# Patient Record
Sex: FEMALE | Race: WHITE | NOT HISPANIC OR LATINO | Employment: STUDENT | URBAN - METROPOLITAN AREA
[De-identification: names, ages, dates, MRNs, and addresses within clinical notes are randomized per-mention and may not be internally consistent; named-entity substitution may affect disease eponyms.]

---
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
## 2024-01-05 ENCOUNTER — TELEMEDICINE (OUTPATIENT)
Dept: BEHAVIORAL/MENTAL HEALTH CLINIC | Facility: CLINIC | Age: 19
End: 2024-01-05
Payer: COMMERCIAL

## 2024-01-05 DIAGNOSIS — F33.1 MODERATE EPISODE OF RECURRENT MAJOR DEPRESSIVE DISORDER (HCC): ICD-10-CM

## 2024-01-05 DIAGNOSIS — F41.1 GENERALIZED ANXIETY DISORDER: Primary | ICD-10-CM

## 2024-01-05 PROCEDURE — 90837 PSYTX W PT 60 MINUTES: CPT | Performed by: COUNSELOR

## 2024-01-05 NOTE — BH TREATMENT PLAN
"    Outpatient Behavioral Health Psychotherapy Treatment Plan    Luciana Orozco  2005     Date of Initial Psychotherapy Assessment: 10/23/2019   Date of Current Treatment Plan: 01/05/24  Treatment Plan Target Date: 05/05/24  Treatment Plan Expiration Date: 07/05/24    Diagnosis:   1. Generalized anxiety disorder        2. Moderate episode of recurrent major depressive disorder (HCC)            Area(s) of Need: Anxiety reduction, mood management, and self-harm    Long Term Goal 1 (in the client's own words): I want to continue to resist urges to cut.     Stage of Change: Action    Target Date for completion: TBD     Anticipated therapeutic modalities: Supportive therapy     People identified to complete this goal: client and therapist      Objective 1: (identify the means of measuring success in meeting the objective): Be aware of urges early and use distraction to keep from cutting.      Objective 2: (identify the means of measuring success in meeting the objective): Avoid triggering media       Long Term Goal 2 (in the client's own words): Maintain improved mood (less depression, lower anxiety) currently scaled at 6-7 managed    Stage of Change: Action    Target Date for completion: TBD     Anticipated therapeutic modalities: supportive therapy, med management     People identified to complete this goal: Client, therapist, psychiatrist      Objective 1: (identify the means of measuring success in meeting the objective): Increased time out of the house and in contact with others.      Objective 2: (identify the means of measuring success in meeting the objective): Include and balance \"me time\" that allows time for self-care (doing makeup, coloring, listening to music)     Objective 3: (identify the means of measuring success in meeting the objective): Being able to talk about my anxiety with others is helpful (gets it out of my mind) Releases it.     Objective 4: (identify the means of measuring success in " meeting the objective): See MH supports (psych and therapist) regularly and take meds as prescribed.       I am currently under the care of a [facility] psychiatric provider: yes    My [facility] psychiatric provider is: Dr. Chakraborty    I am currently taking psychiatric medications: Yes, as prescribed    I feel that I will be ready for discharge from mental health care when I reach the following (measurable goal/objective): When I have met the goals identified and maintain stability with reduced MH support.     For children and adults who have a legal guardian:   Has there been any change to custody orders and/or guardianship status? NA. If yes, attach updated documentation.      Behavioral Health Treatment Plan [facility]: Diagnosis and Treatment Plan explained to Luciana Ernesto Luciana Orozco acknowledges an understanding of their diagnosis. Luciana Ernesto agrees to this treatment plan.    I have been offered a copy of this Treatment Plan. Yes  Luciana Orozco, 2005, actively participated in the review and update of this treatment plan during a virtual session, using the [employer] platform.   Luciana Orozco  provided verbal consent on 1/05/24 at 11:40 AM. The treatment plan was transcribed into the Electronic Health Record at a later time.

## 2024-01-05 NOTE — PSYCH
Virtual Regular Visit    Verification of patient location:    Patient is located at Home in the following state in which I hold an active license [place]      Assessment/Plan:    Problem List Items Addressed This Visit        Other    Generalized anxiety disorder - Primary   Other Visit Diagnoses     Moderate episode of recurrent major depressive disorder (HCC)              Goals addressed in session: Goal 1          Reason for visit is   Chief Complaint   Patient presents with   • Virtual Regular Visit          Encounter provider RAMANA Kilgore    Provider located at [facility] TH[facility]APIST [facility]  [facility] THERAPIST MENTAL HEALTH OUTPATIENT  [address] [id number] [phone]      Recent Visits  Date Type Provider Dept   01/05/24 Telemedicine RAMANA Kilgore [facility] Therapist [facility]   Showing recent visits within past 7 days and meeting all other requirements  Future Appointments  No visits were found meeting these conditions.  Showing future appointments within next 150 days and meeting all other requirements       The patient was identified by name and date of birth. Luciana Orozco was informed that this is a telemedicine visit and that the visit is being conducted throughthe [employer] platform. She agrees to proceed..  My office door was closed. No one else was in the room.  She acknowledged consent and understanding of privacy and security of the video platform. The patient has agreed to participate and understands they can discontinue the visit at any time.    Patient is aware this is a billable service.     Subjective  Luciana Orozco is a 18 y.o. adult  .      HPI     Past Medical History:   Diagnosis Date   • Anxiety    • Asthma    • Depression    • Eczema        Past Surgical History:   Procedure Laterality Date   • WISDOM TOOTH EXTRACTION         Current Outpatient Medications   Medication Sig Dispense Refill   • ARIPiprazole (ABILIFY) 5 mg tablet  Aripiprazole 5m 1/2  tablet po  daily 45 tablet 1   • cholecalciferol (VITAMIN D3) 1,000 units tablet Take 1 tablet (1,000 Units total) by mouth daily Do not start before 2023. 30 tablet 0   • ergocalciferol (VITAMIN D2) 50,000 units Take 1 capsule (50,000 Units total) by mouth once a week for 13 doses Do not start before 2023. 13 capsule 0   • FLUoxetine (PROzac) 40 MG capsule Fluoxetine 40m capsules in the morning 60 capsule 1   • hydrocortisone 2.5 % cream Apply 2.5 Applications topically if needed     • levonorgestrel-ethinyl estradiol (Chapis) 90-20 MCG per tablet Take 1 tablet by mouth daily 90 tablet 4   • lidocaine (LIDODERM) 5 % Apply 1 patch topically over 12 hours daily Remove & Discard patch within 12 hours or as directed by MD Do not start before 2023. 30 patch 0   • naltrexone (REVIA) 50 mg tablet Naltrexone 50m tablet po daily 30 tablet 2   • traZODone (DESYREL) 50 mg tablet Take 1 tablet (50 mg total) by mouth daily at bedtime 30 tablet 0     No current facility-administered medications for this visit.        Allergies   Allergen Reactions   • Benadryl [Diphenhydramine] Hyperactivity   • Mangifera Indica Itching     [place]    • Pineapple - Food Allergy Itching       Review of Systems    Video Exam    There were no vitals filed for this visit.    Physical Exam     Behavioral Health Psychotherapy Progress Note    Psychotherapy Provided: Individual Psychotherapy     1. Generalized anxiety disorder        2. Moderate episode of recurrent major depressive disorder (HCC)            Goals addressed in session: Goal 1     DATA:     -CT stated that she is doing well. CT described the holidays that included spending time with BF regularly.   -CT reported that her Catarina is aware that she smokes, no conversation about this but an unspoken acceptance. CT feels more comfortable now that she does not have to sneak.   -CT reported that she is not sure is she passed any of her  "college classes. CT stated that she has decided to only take 1 class at the most in the spring semester and work more hours, still part-time.   -TH invited CT to reflect on her progress with learning to drive. CT stated that she has not been driving because she feels anxious about it. Reviewed anxiety cycle dynamics; what changes a cycle vs what continues the cycle.   -TH invited CT to describe her cannabis use. CT reported daily use with benefits being decreased anxiety and having a more positive attitude/view of herself.   -CT reported that she has not cut in several months. CT noted occasional urges that she deals with by distracting herself. TH celebrated this positive change.  -CT stated that she has decreased her social media use that has happened because she is spending more time out of the house. CT notes a positive benefit of this.  -Reviewed and updated tx plan.   During this session, this clinician used the following therapeutic modalities: Supportive Psychotherapy    Substance Abuse was addressed during this session. If the client is diagnosed with a co-occurring substance use disorder, please indicate any changes in the frequency or amount of use: regular cannabis use-perceived to be fully positive (less anxiety and improved self image) . Stage of change for addressing substance use diagnoses: Pre-contemplation    ASSESSMENT:  Luciana Orozco presents with a Euthymic/ normal mood.     her affect is Normal range and intensity and Flat, which is congruent, with her mood and the content of the session. The client has made progress on their goals.     Luciana Orozco presents with a none risk of suicide, minimal risk of self-harm, and none risk of harm to others.    For any risk assessment that surpasses a \"low\" rating, a safety plan must be developed.    A safety plan was indicated: no  If yes, describe in detail     PLAN: Between sessions, Luciana Orozco will plan for 1-2 classes in spring semester " and discuss with Catarina, continue using distraction to address self-harm urges. . At the next session, the therapist will use Motivational Interviewing and Supportive Psychotherapy to support clietn well being and review pro/cons of regular cannabis use.  .    Behavioral Health Treatment Plan and Discharge Planning: Luciana Orozco is aware of and agrees to continue to work on their treatment plan. They have identified and are working toward their discharge goals. yes    Visit start and stop times:    01/05/24  Start Time: 1103  Stop Time: 1202  Total Visit Time: 59 minutes

## 2024-02-09 DIAGNOSIS — F40.10 SOCIAL ANXIETY DISORDER: ICD-10-CM

## 2024-02-09 RX ORDER — FLUOXETINE HYDROCHLORIDE 40 MG/1
CAPSULE ORAL
Qty: 60 CAPSULE | Refills: 1 | Status: SHIPPED | OUTPATIENT
Start: 2024-02-09

## 2024-02-09 RX ORDER — FLUOXETINE HYDROCHLORIDE 40 MG/1
CAPSULE ORAL
Qty: 60 CAPSULE | Refills: 0 | OUTPATIENT
Start: 2024-02-09

## 2024-02-09 RX ORDER — ARIPIPRAZOLE 5 MG/1
TABLET ORAL
Qty: 45 TABLET | Refills: 0 | OUTPATIENT
Start: 2024-02-09

## 2024-02-09 RX ORDER — ARIPIPRAZOLE 5 MG/1
TABLET ORAL
Qty: 45 TABLET | Refills: 1 | Status: SHIPPED | OUTPATIENT
Start: 2024-02-09

## 2024-02-09 NOTE — TELEPHONE ENCOUNTER
Pt Luciana Orozco, 2005, SLPF was reviewed. Sent Fluoxetine 40mg qty 60  and Aripiprazole 5mg qty 45 was sent to  [facility]    This note was not shared with the patient due to reasonable likelihood of causing patient harm      This note may have been written with the assistance of dictation software. Please excuse any grammatical  errors, misspellings,  and abnormal spacing of letters , sentences or paragraphs . For accurate interpretation should read note horizontally

## 2024-05-20 ENCOUNTER — OFFICE VISIT (OUTPATIENT)
Dept: OBGYN CLINIC | Facility: HOSPITAL | Age: 19
End: 2024-05-20
Payer: COMMERCIAL

## 2024-05-20 ENCOUNTER — HOSPITAL ENCOUNTER (OUTPATIENT)
Dept: RADIOLOGY | Facility: HOSPITAL | Age: 19
Discharge: HOME | End: 2024-05-20
Attending: ORTHOPAEDIC SURGERY
Payer: COMMERCIAL

## 2024-05-20 VITALS — HEART RATE: 84 BPM | OXYGEN SATURATION: 97 %

## 2024-05-20 DIAGNOSIS — M42.06 SCHEUERMANN'S KYPHOSIS OF LUMBAR SPINE: Primary | ICD-10-CM

## 2024-05-20 DIAGNOSIS — M42.06 SCHEUERMANN'S KYPHOSIS OF LUMBAR SPINE: ICD-10-CM

## 2024-05-20 PROCEDURE — 99214 OFFICE O/P EST MOD 30 MIN: CPT | Performed by: ORTHOPAEDIC SURGERY

## 2024-05-20 PROCEDURE — 72082 X-RAY EXAM ENTIRE SPI 2/3 VW: CPT

## 2024-05-20 NOTE — PROGRESS NOTES
18 y.o. adult   Chief complaint:   Chief Complaint   Patient presents with    Spine - Follow-up       HPI:  Here with grandmother for follow up regarding scheuermann kyphosis. Patient has decided against bariatric surgery. She did have a period of time last year where she had SI and checked herself into a mental health facility and has since been doing well. Her back pain has worsened in the interim, which she reports has prevented her from walking. Her immobility secondary to pain has hindered her efforts to lose weight. She denies bowel/bladder issues, night pain, worsening paraesthesias, saddle anesthesia, increasing subjective weakness, clumsiness, gait abnormalities from baseline. She does report bilateral numbness starting in her lateral knee and into the dorsal aspect of the feet bilaterally mostly when she's sitting      Past Medical History:   Diagnosis Date    Anxiety     Asthma     Depression     Eczema      Past Surgical History:   Procedure Laterality Date    WISDOM TOOTH EXTRACTION       Family History   Problem Relation Age of Onset    Liver cancer Maternal Grandfather     Lung cancer Maternal Grandfather      Social History     Socioeconomic History    Marital status: Single     Spouse name: Not on file    Number of children: Not on file    Years of education: Not on file    Highest education level: Not on file   Occupational History    Not on file   Tobacco Use    Smoking status: Never    Smokeless tobacco: Current    Tobacco comments:     Luciana Vape daily    Vaping Use    Vaping status: Some Days    Substances: Nicotine, THC, Flavoring   Substance and Sexual Activity    Alcohol use: Yes     Comment: rarely    Drug use: Yes     Types: Marijuana, Psilocybin     Comment: daily marijuana; mushroom 3-4 months ago    Sexual activity: Yes     Partners: Male, Female     Birth control/protection: Pill, OCP, Condom Male   Other Topics Concern    Not on file   Social History Narrative    Not on file      Social Determinants of Health     Financial Resource Strain: Not on file   Food Insecurity: Not on file   Transportation Needs: Not on file   Physical Activity: Not on file   Stress: Not on file   Social Connections: Not on file   Intimate Partner Violence: Not on file   Housing Stability: Not on file     Current Outpatient Medications   Medication Sig Dispense Refill    ARIPiprazole (ABILIFY) 5 mg tablet Aripiprazole 5m 1/2  tablet po  daily 45 tablet 1    FLUoxetine (PROzac) 40 MG capsule Fluoxetine 40m capsules in the morning 60 capsule 1    cholecalciferol (VITAMIN D3) 1,000 units tablet Take 1 tablet (1,000 Units total) by mouth daily Do not start before 2023. 30 tablet 0    ergocalciferol (VITAMIN D2) 50,000 units Take 1 capsule (50,000 Units total) by mouth once a week for 13 doses Do not start before 2023. 13 capsule 0    hydrocortisone 2.5 % cream Apply 2.5 Applications topically if needed      levonorgestrel-ethinyl estradiol (Chapis) 90-20 MCG per tablet Take 1 tablet by mouth daily 90 tablet 4    lidocaine (LIDODERM) 5 % Apply 1 patch topically over 12 hours daily Remove & Discard patch within 12 hours or as directed by MD Do not start before 2023. 30 patch 0    naltrexone (REVIA) 50 mg tablet Naltrexone 50m tablet po daily 30 tablet 2    traZODone (DESYREL) 50 mg tablet Take 1 tablet (50 mg total) by mouth daily at bedtime 30 tablet 0     No current facility-administered medications for this visit.     Benadryl [diphenhydramine], Mangifera indica, and Pineapple - food allergy  Patient's medications, allergies, past medical, surgical, social and family histories were reviewed and updated as appropriate.     Unless otherwise noted above, past medical history, family history, and social history are noncontributory.    Patient's caretaker was present and provided pertinent history.  I personally reviewed all images and discussed them with the caretaker.  All  plans outlined below were discussed with the patient's caretaker present for this visit.    Review of Systems:  Constitutional: no chills  Respiratory: no chest pain  Cardio: no syncope  GI: no abdominal pain  : no urinary continence  Neuro: no headaches  Psych: no anxiety  Skin: no rash  MS: except as noted in HPI and chief complaint  Allergic/immunology: no contact dermatitis    Physical Exam:  Pulse 84, SpO2 97%, not currently breastfeeding.    Constitutional: Patient is cooperative. Does not have a sickly appearance. Does not appear ill. No distress.   Head: Atraumatic.   Eyes: Conjunctivae are normal.   Cardiovascular: 2+ radial pulses bilaterally with brisk cap refill of all fingers.   Pulmonary/Chest: Effort normal. No stridor.   Abdomen: soft NT/ND  Skin: Skin is warm and dry. No rash noted. No erythema. No skin breakdown.  Psychiatric: mood/affect appropriate, behavior is normal     Spine:  No midline tenderness  Full ROM with pain elicited in mid thoracic region upon deep flexion and limited extension  C5-T1 motor 5/5 and SILT  L2-S1 motor 5/5 and SILT  symmetric normo-reflexic triceps, patella, Achilles, abdominal  no neurocutaneous lesions to suggest spinal dysraphism    Studies reviewed:  XR redemonstrates scheuermann kyphosis  SSV L3 but most lordotic and end kyphotic vertebrae L1      MRI reviewed from last summer with mild L4-L5 central disc herniation without substantial stenosis    Impression:  XR Scheuermann kyphosis with persistent symptoms specifically in the kyphotic region that has been refractory to extended periods of conservative management and discussions    Patient says she's mostly off psych meds and feels this is under control    She has discussed bariatric surgery with a family member and decided this is not a path she currently wishes to pursue    She does meet criteria for PSFI for Scheuermann - if this were pursued we discussed a goal of L2 BHARATI - we moreso discussed the option of  continued observation and no surgery - we discussed appropriateness of expectations and that this procedure could either not change anything, not change enough to be asymptomatic, not fix her lower back pain, and/or make her back pain worse    We also discussed additional risks/benefits associated with PSFI    I recommended avoiding surgery if she considers that a feasible plan but I think at this point she has somewhat optimized all other controllable factors and after multiple extensive outpatient discussions during which she has met standard indications for the procedure is in a position to make a decision. She seemed ready to consent today but I urged her to go home and continue weighing pros and cons    Plan:  Patient's caretaker was present and provided pertinent history.  I personally reviewed all images and discussed them with the caretaker.  All plans outlined below were discussed with the patient's caretaker present for this visit.    Treatment options were discussed in detail. After considering all various options, the plan will include:    Patient to consider nonop vs op options as stated above and call us or reschedule appt for further discussion    If were pursuing surgical intervention:    Vit D was low - recheck and consider endocrine eval/clearance    MRI C/T/L-spine (separate MRIs to assess L-spine in detail)    Other preop labs    PCP clearance    Eventual preop XR (not next visit) supine lateral on bolster (with bolster exactly on apex of kyphosis)      I have spent a total time of >30 minutes on 5/20/2024 in caring for this patient including Diagnostic results, Prognosis, Risks and benefits of tx options, Instructions for management, Patient and family education, Importance of tx compliance, Impressions, Counseling / Coordination of care, Documenting in the medical record, Reviewing / ordering tests, medicine, procedures  , and Obtaining or reviewing history  .

## 2024-06-17 ENCOUNTER — OFFICE VISIT (OUTPATIENT)
Dept: OBGYN CLINIC | Facility: HOSPITAL | Age: 19
End: 2024-06-17
Payer: COMMERCIAL

## 2024-06-17 VITALS — BODY MASS INDEX: 46.12 KG/M2 | WEIGHT: 276.8 LBS | HEART RATE: 91 BPM | HEIGHT: 65 IN | OXYGEN SATURATION: 95 %

## 2024-06-17 DIAGNOSIS — M42.00 SCHEURMANN'S DISEASE: Primary | ICD-10-CM

## 2024-06-17 DIAGNOSIS — E55.9 VITAMIN D INSUFFICIENCY: ICD-10-CM

## 2024-06-17 PROCEDURE — 99214 OFFICE O/P EST MOD 30 MIN: CPT | Performed by: ORTHOPAEDIC SURGERY

## 2024-06-17 RX ORDER — CEFAZOLIN SODIUM 2 G/50ML
2000 SOLUTION INTRAVENOUS ONCE
OUTPATIENT
Start: 2024-06-17 | End: 2024-06-17

## 2024-06-17 NOTE — PROGRESS NOTES
18 y.o. 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 She reports continued upper/mid-back pain > lower back pain. Worse when standing for prolonged periods and going up steps and hills. She reports difficulty standing at work due to symptoms. Does report intermittent numbness in her lateral calf region into dorsal aspect of her feet primarily when sitting for prolonged periods of time. Otherwise denies radicular symptoms. She reports attempted physical therapy in the past without any lasting benefit.      Past Medical History:   Diagnosis Date    Anxiety     Asthma     Depression     Eczema      Past Surgical History:   Procedure Laterality Date    WISDOM TOOTH EXTRACTION       Family History   Problem Relation Age of Onset    Liver cancer Maternal Grandfather     Lung cancer Maternal Grandfather      Social History     Socioeconomic History    Marital status: Single     Spouse name: Not on file    Number of children: Not on file    Years of education: Not on file    Highest education level: Not on file   Occupational History    Not on file   Tobacco Use    Smoking status: Never    Smokeless tobacco: Current    Tobacco comments:     Luciana Vape daily    Vaping Use    Vaping status: Some Days    Substances: Nicotine, THC, Flavoring   Substance and Sexual Activity    Alcohol use: Yes     Comment: rarely    Drug use: Yes     Types: Marijuana, Psilocybin     Comment: daily marijuana; mushroom 3-4 months ago    Sexual activity: Yes     Partners: Male, Female     Birth control/protection: Pill, OCP, Condom Male   Other Topics Concern    Not on file   Social History Narrative    Not on file     Social Determinants of Health     Financial Resource Strain: Not on file   Food Insecurity: Not on file   Transportation Needs: Not on file   Physical Activity: Not on file   Stress: Not on  file   Social Connections: Not on file   Intimate Partner Violence: Not on file   Housing Stability: Not on file     Current Outpatient Medications   Medication Sig Dispense Refill    ARIPiprazole (ABILIFY) 5 mg tablet Aripiprazole 5m 1/2  tablet po  daily (Patient not taking: Reported on 2024) 45 tablet 1    FLUoxetine (PROzac) 40 MG capsule Fluoxetine 40m capsules in the morning (Patient not taking: Reported on 2024) 60 capsule 1    cholecalciferol (VITAMIN D3) 1,000 units tablet Take 1 tablet (1,000 Units total) by mouth daily Do not start before 2023. (Patient not taking: Reported on 2024) 30 tablet 0    ergocalciferol (VITAMIN D2) 50,000 units Take 1 capsule (50,000 Units total) by mouth once a week for 13 doses Do not start before 2023. 13 capsule 0    hydrocortisone 2.5 % cream Apply 2.5 Applications topically if needed (Patient not taking: Reported on 2024)      levonorgestrel-ethinyl estradiol (Chapis) 90-20 MCG per tablet Take 1 tablet by mouth daily (Patient not taking: Reported on 2024) 90 tablet 4    lidocaine (LIDODERM) 5 % Apply 1 patch topically over 12 hours daily Remove & Discard patch within 12 hours or as directed by MD Do not start before 2023. (Patient not taking: Reported on 2024) 30 patch 0    naltrexone (REVIA) 50 mg tablet Naltrexone 50m tablet po daily (Patient not taking: Reported on 2024) 30 tablet 2    traZODone (DESYREL) 50 mg tablet Take 1 tablet (50 mg total) by mouth daily at bedtime 30 tablet 0     No current facility-administered medications for this visit.     Benadryl [diphenhydramine], Mangifera indica, and Pineapple - food allergy  Patient's medications, allergies, past medical, surgical, social and family histories were reviewed and updated as appropriate.     Unless otherwise noted above, past medical history, family history, and social history are noncontributory.    Patient's caretaker was present  "and provided pertinent history.  I personally reviewed all images and discussed them with the caretaker.  All plans outlined below were discussed with the patient's caretaker present for this visit.    Review of Systems:  Constitutional: no chills  Respiratory: no chest pain  Cardio: no syncope  GI: no abdominal pain  : no urinary continence  Neuro: no headaches  Psych: no anxiety  Skin: no rash  MS: except as noted in HPI and chief complaint  Allergic/immunology: no contact dermatitis    Physical Exam:  Pulse 91, height 5' 4.5\" (1.638 m), weight 126 kg (276 lb 12.8 oz), SpO2 95%, not currently breastfeeding.    Constitutional: Patient is cooperative. Does not have a sickly appearance. Does not appear ill. No distress.   Head: Atraumatic.   Eyes: Conjunctivae are normal.   Cardiovascular: 2+ radial pulses bilaterally with brisk cap refill of all fingers.   Pulmonary/Chest: Effort normal. No stridor.   Abdomen: soft NT/ND  Skin: Skin is warm and dry. No rash noted. No erythema. No skin breakdown.  Psychiatric: mood/affect appropriate, behavior is normal     Spine:  No midline tenderness, mild TTP paraspinal musculature  Full ROM with pain elicited in mid thoracic region upon deep flexion, also increased pain with extension  C5-T1 motor 5/5 and SILT  L2-S1 motor 5/5 and SILT  Symmetric normo-reflexic triceps, patella, Achilles, abdominal    Studies reviewed:  MRI L-spine with preserved disc hydration throughout  T-spine with 5 adjacent wedged vertebrae and above normal thoracic kyphosis    Impression:  Scheuermann kyphosis     Patient has had persistent symptoms related to known scheuermann kyphosis that has been refractory to multiple, extended periods of conservative treatment including physical therapy    Patient says she has been off psych meds and feels this is under control, noting she doesn't feel the need to take them currently     Discussed additional risks/benefits associated with PSFI     Plan:  Patient's " caretaker was present and provided pertinent history.  I personally reviewed all images and discussed them with the caretaker.  All plans outlined below were discussed with the patient's caretaker present for this visit.    Treatment options were discussed in detail. After considering all various options, the plan will include:    Again discussed operative vs non operative treatment options, including risks and benefits associated with PSFI. We have now had multiple outpatient visits and a lengthy follow-up period during which we delayed the offer of operative intervention but symptoms persist and she is currently seeking jobs that can be performed sitting due to mid-thoracic back pain at the apex of her kyphosis. Patient wishes to pursue surgical intervention at this time. She has endured an extended period of nonoperative management with a persistent display throughout multiple clinic visits of appropriate insight regarding pros/cons of surgical intervention. We again discussed today risks/benefits of PSFI for spinal deformity and also PJK vs PJF, psuedoarthrosis, possibility of persistent pain, further surgery, decreased motion, etc and she acknowledged these issues.    Will plan to tentatively schedule a surgical date (~6 months from now) and have patient follow up 1 month prior to scheduled surgery date      To complete prior to surgical intervention:    Most recent Vit D was low - placed referral to endocrine for eval/clearance, recheck vitamin D level - goal is to have a plan to maintain optimal vit D in periop timeframe    Will need to obtain PCP clearance    At our preop visit:  -preop XR scoli 3 view (not benders for this patient) --> standing scoli PA + standing scoli lateral + supine lateral on bolster (with bolster just below apex of kyphosis)    -order CT scan for Mazor protocol    -order preop labs - CBC, PT/PTT, BMP, 25-OH-D (if not obtained recently), T&S    -order PFTs    This document was created  using speech voice recognition software.   Grammatical errors, random word insertions, pronoun errors, and incomplete sentences are an occasional consequence of this system due to software limitations, ambient noise, and hardware issues.   Any formal questions or concerns about content, text, or information contained within the body of this dictation should be directly addressed to the provider for clarification.

## 2024-07-05 ENCOUNTER — DOCUMENTATION (OUTPATIENT)
Dept: BEHAVIORAL/MENTAL HEALTH CLINIC | Facility: CLINIC | Age: 19
End: 2024-07-05

## 2024-07-05 DIAGNOSIS — F41.1 GENERALIZED ANXIETY DISORDER: Primary | ICD-10-CM

## 2024-07-05 NOTE — PROGRESS NOTES
Psychotherapy Discharge Summary    Preferred Name: Luciana Orozco  YOB: 2005    Admission date to psychotherapy: 10/23/19    Referred by: unknown    Presenting Problem: Anxiety, family relationship issues    Course of treatment included : medication management, individual case management, psychiatric evaluation, and individual therapy     Progress/Outcome of Treatment Goals (brief summary of course of treatment) Client was able to enroll in college but had difficulties completing class work-withdrew from several classes, maintain part-time employment, and increased her understanding of her anxiety patterns and use new tools to address.     Treatment Complications (if any): Unresolved family relationship challenges in the home caused a great deal of stress. Client stopped engaging in therapy and did not respond to outreach.    Treatment Progress: hugo Chakraborty    Discharge Medications include: see chart records for current meds    Discharge Date: 07/05/24    Discharge Diagnosis:   1. Generalized anxiety disorder            Criteria for Discharge: two or more unexcused absences for services    Aftercare recommendations include (include specific referral names and phone numbers, if appropriate): Client would likely benefit from consistent psychotherapy    Prognosis: fair

## 2024-08-06 NOTE — PROGRESS NOTES
Psychiatric Discharge Summary- Administrative Discharge     Admit Date: See H&P for admit date  Discharge Date: 24     Discharge Diagnosis:   Patient Active Problem List   Diagnosis    Generalized anxiety disorder    Attention deficit disorder predominant inattentive type    Thoughts of self harm    Obesity, Class III, BMI 40-49.9 (morbid obesity) ([facility])    Binge eating disorder    Vitamin D insufficiency    Hypercholesterolemia    Bipolar affective disorder, current episode mixed ([facility])        Treating Physician: Dr. Leilani Chakraborty,       Presenting Problems/Pertinent Findings: See Initial H&P     Course of Treatment:See Most Recent Med Management Progress Note from 11/10/2023.    The patient's primary treating provider is no longer with practice.  Patient has not responded to outreach; letter asking for response regarding her intention to stay in treatment sent 3/22/2024 with no response. The chart is being administratively closed at this time.  For treatment course, please request past records when patient was in treatment with primary provider..      Discharge Medications:   Current Outpatient Medications:     ARIPiprazole (ABILIFY) 5 mg tablet, Aripiprazole 5m 1/2  tablet po  daily (Patient not taking: Reported on 2024), Disp: 45 tablet, Rfl: 1    cholecalciferol (VITAMIN D3) 1,000 units tablet, Take 1 tablet (1,000 Units total) by mouth daily Do not start before 2023. (Patient not taking: Reported on 2024), Disp: 30 tablet, Rfl: 0    ergocalciferol (VITAMIN D2) 50,000 units, Take 1 capsule (50,000 Units total) by mouth once a week for 13 doses Do not start before 2023., Disp: 13 capsule, Rfl: 0    FLUoxetine (PROzac) 40 MG capsule, Fluoxetine 40m capsules in the morning (Patient not taking: Reported on 2024), Disp: 60 capsule, Rfl: 1    hydrocortisone 2.5 % cream, Apply 2.5 Applications topically if needed (Patient not taking: Reported on 2024), Disp: , Rfl:      levonorgestrel-ethinyl estradiol (Chapis) 90-20 MCG per tablet, Take 1 tablet by mouth daily (Patient not taking: Reported on 2024), Disp: 90 tablet, Rfl: 4    lidocaine (LIDODERM) 5 %, Apply 1 patch topically over 12 hours daily Remove & Discard patch within 12 hours or as directed by MD Do not start before 2023. (Patient not taking: Reported on 2024), Disp: 30 patch, Rfl: 0    naltrexone (REVIA) 50 mg tablet, Naltrexone 50m tablet po daily (Patient not taking: Reported on 2024), Disp: 30 tablet, Rfl: 2    traZODone (DESYREL) 50 mg tablet, Take 1 tablet (50 mg total) by mouth daily at bedtime, Disp: 30 tablet, Rfl: 0

## 2024-09-17 ENCOUNTER — CONSULT (OUTPATIENT)
Dept: ENDOCRINOLOGY | Facility: HOSPITAL | Age: 19
End: 2024-09-17
Payer: COMMERCIAL

## 2024-09-17 VITALS
HEIGHT: 65 IN | DIASTOLIC BLOOD PRESSURE: 80 MMHG | SYSTOLIC BLOOD PRESSURE: 122 MMHG | WEIGHT: 271.8 LBS | BODY MASS INDEX: 45.29 KG/M2 | HEART RATE: 70 BPM

## 2024-09-17 DIAGNOSIS — M42.00 SCHEURMANN'S DISEASE: ICD-10-CM

## 2024-09-17 DIAGNOSIS — E55.9 VITAMIN D INSUFFICIENCY: ICD-10-CM

## 2024-09-17 PROCEDURE — 99243 OFF/OP CNSLTJ NEW/EST LOW 30: CPT | Performed by: STUDENT IN AN ORGANIZED HEALTH CARE EDUCATION/TRAINING PROGRAM

## 2024-09-17 NOTE — PROGRESS NOTES
9/17/2024    Assessment & Plan        Problem List Items Addressed This Visit          Other    Vitamin D insufficiency    Relevant Orders    Vitamin D 25 hydroxy    Comprehensive metabolic panel     Other Visit Diagnoses       Scheurmann's disease        Relevant Orders    Vitamin D 25 hydroxy    Comprehensive metabolic panel            Assessment/Plan:  Patient is a 19yF with schaumann kyphosis here for vitD def    1) VitD def:- we do not have repeat labs. Obtain labs now and as long as >30 ok to proceed with orthopedic surgery. If <30, will give high dose replacement and ok to proceed with surgery in 6 weeks after. Repeat vitD level in 3 months     Discussed since only has vitD def, can follow up with PCP as well for management of this.     RTC PRN      CC: vit D def    History of Present Illness     HPI: Luciana Orozco is a 19 y.o. year old adult with history of Schaumann kyphosis follows orthopedics who was referred to us for vitD def. Other Pmhx of anxiety, depression, asthma. Bipolar dx     Patients last VitD level on file 04/23 at 22. No repeat on file. Patient currently not taking any vitD supplement. Did take OTC vitD 1000iu but stopped 1 month ago    Social/Family hx- see below     Review of Systems   Constitutional:  Negative for fatigue and unexpected weight change.   HENT:  Negative for trouble swallowing and voice change.    Eyes:  Negative for photophobia and visual disturbance.   Respiratory:  Negative for choking and shortness of breath.    Gastrointestinal:  Negative for constipation and diarrhea.   Endocrine: Negative for cold intolerance and heat intolerance.   Musculoskeletal:  Negative for arthralgias and myalgias.   Skin:  Negative for rash.       Historical Information   Past Medical History:   Diagnosis Date    Anxiety     Asthma     Depression     Eczema      Past Surgical History:   Procedure Laterality Date    WISDOM TOOTH EXTRACTION       Social History   Social History     Substance  and Sexual Activity   Alcohol Use Yes    Comment: rarely     Social History     Substance and Sexual Activity   Drug Use Yes    Types: Marijuana, Psilocybin    Comment: daily marijuana; mushroom 3-4 months ago     Social History     Tobacco Use   Smoking Status Never   Smokeless Tobacco Current   Tobacco Comments    Luciana Vape daily      Family History:   Family History   Problem Relation Age of Onset    No Known Problems Father     Hashimoto's thyroiditis Brother     Liver cancer Maternal Grandfather     Lung cancer Maternal Grandfather        Meds/Allergies   Current Outpatient Medications   Medication Sig Dispense Refill    ARIPiprazole (ABILIFY) 5 mg tablet Aripiprazole 5m 1/2  tablet po  daily (Patient not taking: Reported on 2024) 45 tablet 1    cholecalciferol (VITAMIN D3) 1,000 units tablet Take 1 tablet (1,000 Units total) by mouth daily Do not start before 2023. (Patient not taking: Reported on 2024) 30 tablet 0    ergocalciferol (VITAMIN D2) 50,000 units Take 1 capsule (50,000 Units total) by mouth once a week for 13 doses Do not start before 2023. (Patient not taking: Reported on 2024) 13 capsule 0    FLUoxetine (PROzac) 40 MG capsule Fluoxetine 40m capsules in the morning (Patient not taking: Reported on 2024) 60 capsule 1    hydrocortisone 2.5 % cream Apply 2.5 Applications topically if needed (Patient not taking: Reported on 2024)      levonorgestrel-ethinyl estradiol (Chapis) 90-20 MCG per tablet Take 1 tablet by mouth daily (Patient not taking: Reported on 2024) 90 tablet 4    lidocaine (LIDODERM) 5 % Apply 1 patch topically over 12 hours daily Remove & Discard patch within 12 hours or as directed by MD Do not start before 2023. (Patient not taking: Reported on 2024) 30 patch 0    naltrexone (REVIA) 50 mg tablet Naltrexone 50m tablet po daily (Patient not taking: Reported on 2024) 30 tablet 2    traZODone (DESYREL)  "50 mg tablet Take 1 tablet (50 mg total) by mouth daily at bedtime (Patient not taking: Reported on 9/17/2024) 30 tablet 0     No current facility-administered medications for this visit.     Allergies   Allergen Reactions    Benadryl [Diphenhydramine] Hyperactivity    Mangifera Indica Itching     Edgar     Pineapple - Food Allergy Itching       Objective   Vitals: Blood pressure 122/80, pulse 70, height 5' 4.5\" (1.638 m), weight 123 kg (271 lb 12.8 oz), not currently breastfeeding.  Invasive Devices       None                 Body mass index is 45.93 kg/m².  /80   Pulse 70   Ht 5' 4.5\" (1.638 m)   Wt 123 kg (271 lb 12.8 oz)   BMI 45.93 kg/m²    Wt Readings from Last 3 Encounters:   09/17/24 123 kg (271 lb 12.8 oz)   06/17/24 126 kg (276 lb 12.8 oz) (>99%, Z= 2.73)*   06/19/23 125 kg (275 lb) (>99%, Z= 2.65)*     * Growth percentiles are based on CDC (Girls, 2-20 Years) data.     Physical Exam  Constitutional:       Appearance: Normal appearance. She is obese.   Cardiovascular:      Rate and Rhythm: Normal rate and regular rhythm.      Pulses: Normal pulses.   Pulmonary:      Effort: Pulmonary effort is normal.   Abdominal:      General: Abdomen is flat. Bowel sounds are normal.      Palpations: Abdomen is soft.   Skin:     General: Skin is warm and dry.      Capillary Refill: Capillary refill takes less than 2 seconds.   Neurological:      General: No focal deficit present.      Mental Status: She is alert and oriented to person, place, and time.   Psychiatric:         Mood and Affect: Mood normal.         The history was obtained from the review of the chart and from the patient.    Lab Results:       Latest Reference Range & Units 03/05/23 06:36 04/12/23 09:43   Sodium 134 - 144 mmol/L 137 139   Potassium 3.5 - 5.2 mmol/L 3.7 4.6   Chloride 96 - 106 mmol/L 101 100   Carbon Dioxide 20 - 29 mmol/L 27 23   ANION GAP 4 - 13 mmol/L 9    BUN 6 - 20 mg/dL 9 10   Creatinine 0.57 - 1.00 mg/dL 0.62 0.79   SL AMB " BUN/CREATININE RATIO 9 - 23   13   GLUCOSE 70 - 99 mg/dL 86 91   GLUCOSE, FASTING 65 - 99 mg/dL 86    Calcium 8.4 - 10.2 mg/dL 9.7    AST 0 - 40 IU/L 15 20   ALT 0 - 32 IU/L 15 16   ALK PHOS 34 - 104 U/L 52    Total Protein 6.0 - 8.5 g/dL 7.6 7.7   Albumin 3.9 - 5.0 g/dL 4.3 4.7   Total Bilirubin 0.0 - 1.2 mg/dL 0.44 0.5   GFR, Calculated >59 mL/min/1.73 132 111   Albumin/Globulin Ratio 1.2 - 2.2   1.6   Cholesterol 100 - 169 mg/dL 198 233 (H)   Triglycerides 0 - 89 mg/dL 128 142 (H)   HDL >39 mg/dL 47 (L) 48   Non-HDL Cholesterol mg/dl 151    LDL Calculated 0 - 109 mg/dL 125 (H) 159 (H)   LDL/HDL RATIO 0.0 - 3.2 ratio  3.3 (H)   VLDL Cholesterol Sujit 5 - 40 mg/dL  26   ALKALINE PHOSPHATASE ISOENZYMES 42 - 106 IU/L  68   VITAMIN D, 25-HYDROXY 30.0 - 100.0 ng/mL 9.8 (L)    25-Hydroxy, Vitamin D 30.0 - 100.0 ng/mL  22.2 (L)   (H): Data is abnormally high  (L): Data is abnormally low  Future Appointments   Date Time Provider Department Center   11/4/2024 10:15 AM [facility]   12/16/2024 10:15 AM Joseph Padilla MD [facility]

## 2024-09-18 NOTE — PROGRESS NOTES
[facility]  Dr. Jaspreet Wayne  [address] [place] [address]     Marjan Davidson is a 62 y.o. female who present for   Chief Complaint   Patient presents with    Follow-up        She recently had diastolic blood pressure around 100 which took a few days to come down.  She is now seeing the cardiologist.  She takes captopril when the blood pressure is high.  He has been taking Plavix only once a week.  [place]            Past Medical History:   Diagnosis Date    Blood clots in brain     Cerebral infarction (CMS/[facility]) 09/18/2024    Dengue fever     Depression     Hair loss     Hypertension     Osteoporosis     Osteopenia now       Past Surgical History   Procedure Laterality Date    Cerebral aneurysm repair      Eye surgery      cataract removal 02/2019       Social History     Occupational History    Not on file   Tobacco Use    Smoking status: Never    Smokeless tobacco: Never   Substance and Sexual Activity    Alcohol use: Never    Drug use: Never    Sexual activity: Not on file        Family History   Problem Relation Name Age of Onset    Heart disease Biological Father         Patient has no known allergies.      Current Outpatient Medications:     amLODIPine (NORVASC) 5 mg tablet, Take 1 tablet (5 mg total) by mouth daily., Disp: 90 tablet, Rfl: 1    captopriL (CAPOTEN) 25 mg tablet, Take 1 tablet (25 mg total) by mouth once as needed (if BP is more than 150/90). 1 tablet SL if blood pressure is more than 150/90, Disp: 90 tablet, Rfl: 1    clopidogreL (PLAVIX) 75 mg tablet, TAKE 1 TABLET BY MOUTH EVERY DAY (Patient taking differently: Take 75 mg by mouth once a week.), Disp: 90 tablet, Rfl: 1    losartan (COZAAR) 50 mg tablet, TAKE 1 TABLET BY MOUTH EVERY DAY, Disp: 90 tablet, Rfl: 1    Review of Systems   Musculoskeletal:  Positive for arthralgias (Left knee pain).   Neurological:  Positive for numbness (Of second and third  Daily Note     Today's date: 2022  Patient name: Sandrita Christie  : 2005  MRN: [id number]  Referring provider: Lurdes Albrecht  Dx:   Encounter Diagnosis     ICD-10-CM    1  Scheuermann's kyphosis of lumbar spine  M42 06        Subjective: Compliant with HEP, no questions regarding POC, motivated to continue PT    Objective: See treatment diary below      Assessment: Tolerated treatment well with full tx program  Verbal and tactile cues required from PT for safe execution of therapeutic exercises  Patient demonstrated fatigue post treatment and would benefit from continued PT      Plan: Progress treatment as tolerated         EPOC: 22      Manuals             PA glizachary thoracic spine NS                                                   Neuro Re-Ed             Mid row iso 3x15 25# CC            Shld ext iso 3x10 OTB 3"            Scapular + cervical retraction iso 10x10" prone                                                                Ther Ex             Doorway pec stretch 10x10"            Thoracic rotation in chair 2x10 5"            Thoracic flexion in chair 10x5"            Thoracic extension in chair 10x5"            Open Book stretch 2x10 10" L & R                                                   Ther Activity             UE Ergo 4'/4'                          Gait Training                                       Modalities "toes on left foot.).       Vitals:    09/18/24 1102   BP: 126/80   BP Location: Right upper arm   Patient Position: Sitting   Pulse: 61   Temp: 36.9 °C (98.5 °F)   TempSrc: Oral   SpO2: 99%   Weight: 73.9 kg (163 lb)   Height: 1.575 m (5' 2\")     Body mass index is 29.81 kg/m².    Physical Exam  Vitals reviewed.   Constitutional:       Appearance: Normal appearance.   HENT:      Right Ear: Tympanic membrane normal.      Left Ear: Tympanic membrane normal.      Mouth/Throat:      Mouth: Mucous membranes are moist.   Eyes:      Conjunctiva/sclera: Conjunctivae normal.   Cardiovascular:      Rate and Rhythm: Normal rate.      Heart sounds: Normal heart sounds. No murmur heard.  Pulmonary:      Effort: Pulmonary effort is normal.      Breath sounds: Normal breath sounds. No rales.   Abdominal:      General: Bowel sounds are normal.      Palpations: Abdomen is soft.      Tenderness: There is no abdominal tenderness.   Musculoskeletal:         General: Deformity (Right third toe deviated medially) present. Normal range of motion.      Cervical back: Normal range of motion.      Right lower leg: No edema.      Left lower leg: No edema.   Lymphadenopathy:      Cervical: No cervical adenopathy.   Skin:     General: Skin is warm.   Neurological:      General: No focal deficit present.      Mental Status: She is alert and oriented to person, place, and time.   Psychiatric:         Mood and Affect: Mood normal.             Assessment   Problem List Items Addressed This Visit       Hypertension     She will continue to monitor her blood pressure.         Type 2 diabetes mellitus, without long-term current use of insulin (CMS/[facility])     She will repeat blood work when she returns.         Cerebral infarction (CMS/[facility]) - Primary     She will consult neurology regarding continuation of Plavix and/or aspirin.  She agreed to get carotid Doppler done.         Relevant Orders    Ultrasound carotid bilateral     Other Visit Diagnoses  "      Encounter for screening mammogram for malignant neoplasm of breast        Relevant Orders    BI SCREENING MAMMOGRAM BILATERAL(TOMOSYNTHESIS)                Jaspreet Wayne MD  9/18/2024

## 2024-09-20 DIAGNOSIS — E55.9 VITAMIN D INSUFFICIENCY: Primary | ICD-10-CM

## 2024-09-20 LAB
25(OH)D3+25(OH)D2 SERPL-MCNC: 15.4 NG/ML (ref 30–100)
ALBUMIN SERPL-MCNC: 4.5 G/DL (ref 4–5)
ALP SERPL-CCNC: 77 IU/L (ref 42–106)
ALT SERPL-CCNC: 96 IU/L (ref 0–32)
AST SERPL-CCNC: 86 IU/L (ref 0–40)
BILIRUB SERPL-MCNC: 0.4 MG/DL (ref 0–1.2)
BUN SERPL-MCNC: 6 MG/DL (ref 6–20)
BUN/CREAT SERPL: 8 (ref 9–23)
CALCIUM SERPL-MCNC: 10.1 MG/DL (ref 8.7–10.2)
CHLORIDE SERPL-SCNC: 102 MMOL/L (ref 96–106)
CO2 SERPL-SCNC: 22 MMOL/L (ref 20–29)
CREAT SERPL-MCNC: 0.72 MG/DL (ref 0.57–1)
EGFR: 123 ML/MIN/1.73
GLOBULIN SER-MCNC: 3.1 G/DL (ref 1.5–4.5)
GLUCOSE SERPL-MCNC: 97 MG/DL (ref 70–99)
POTASSIUM SERPL-SCNC: 4.9 MMOL/L (ref 3.5–5.2)
PROT SERPL-MCNC: 7.6 G/DL (ref 6–8.5)
SODIUM SERPL-SCNC: 140 MMOL/L (ref 134–144)

## 2024-09-20 RX ORDER — ERGOCALCIFEROL 1.25 MG/1
50000 CAPSULE, LIQUID FILLED ORAL WEEKLY
Qty: 12 CAPSULE | Refills: 1 | Status: SHIPPED | OUTPATIENT
Start: 2024-09-20

## 2024-10-28 ENCOUNTER — TELEPHONE (OUTPATIENT)
Dept: PSYCHIATRY | Facility: CLINIC | Age: 19
End: 2024-10-28

## 2024-10-28 NOTE — TELEPHONE ENCOUNTER
"Behavioral Health Outpatient Intake Questions    Referred By   : Grandmother     Please advise interviewee that they need to answer all questions truthfully to allow for best care, and any misrepresentations of information may affect their ability to be seen at this clinic   => Was this discussed? Yes     If Minor Child (under age 18)    Who is/are the legal guardian(s) of the child?     Is there a custody agreement? No     If \"YES\"- Custody orders must be obtained prior to scheduling the first appointment  In addition, Consent to Treatment must be signed by all legal guardians prior to scheduling the first appointment    If \"NO\"- Consent to Treatment must be signed by all legal guardians prior to scheduling the first appointment    Behavioral Health Outpatient Intake History -     Presenting Problem (in patient's own words): social skills and anxiety, past clt    Are there any communication barriers for this patient?     No                                               If yes, please describe barriers:     If there is a unique situation, please refer to Titus Neal/Nicole Alves for final determination.    Are you taking any psychiatric medications? No     If \"YES\" -What are they     If \"YES\" -Who prescribes?     Has the Patient previously received outpatient Talk Therapy or Medication Management from [facility]  Yes        If \"YES\"- When, Where and with Whom? PF 2024        If \"NO\" -Has Patient received these services elsewhere?       If \"YES\" -When, Where, and with Whom?    Has the Patient abused alcohol or other substances in the last 6 months ? No       If \"YES\" -What substance, How much, How often?     If illegal substance: Refer to [facility] (for KAREN) or [facility].   If Alcohol in excess of 10 drinks per week:  Refer to [facility] (for KAREN) or [facility] Offices    Legal History-     Is this treatment court ordered? No   If \"yes \"send to :  Talk Therapy : Send to Titus Neal/Nicole Alves for " "final determination   Med Management: Send to Dr Conde for final determination     Has the Patient been convicted of a felony?  No   If \"Yes\" send to -When, What?  Talk Therapy: Send to Titus Neal/Nicole Alves for final determination   Med Management: Send to Dr Conde for final determination     ACCEPTED as a patient Yes  If \"Yes\" Appointment Date: 11/21/24    Referred Elsewhere? No  If “Yes” - (Where? Ex: [facility], [facility], [facility], [facility], etc.)     Name of Insurance Co: [facility]  Insurance ID#  [id number]   Insurance Phone #  If ins is primary or secondary?  If patient is a minor, parents information such as Name, D.O.B of guarantor.  "

## 2024-11-06 ENCOUNTER — TELEPHONE (OUTPATIENT)
Dept: OBGYN CLINIC | Facility: HOSPITAL | Age: 19
End: 2024-11-06

## 2024-11-06 NOTE — TELEPHONE ENCOUNTER
LMOM for patient regarding surgery on 12/3/24. Explained that Dr. Padilla is unexpectedly out of the office and will not be doing surgeries, and that we will call her as soon as we have a new surgery date(s).

## 2024-11-18 ENCOUNTER — HOSPITAL ENCOUNTER (EMERGENCY)
Facility: HOSPITAL | Age: 19
Discharge: HOME | End: 2024-11-18
Attending: EMERGENCY MEDICINE | Admitting: EMERGENCY MEDICINE
Payer: COMMERCIAL

## 2024-11-18 VITALS
BODY MASS INDEX: 46.1 KG/M2 | DIASTOLIC BLOOD PRESSURE: 88 MMHG | OXYGEN SATURATION: 98 % | WEIGHT: 270 LBS | HEIGHT: 64 IN | SYSTOLIC BLOOD PRESSURE: 141 MMHG | TEMPERATURE: 97.8 F | HEART RATE: 83 BPM | RESPIRATION RATE: 16 BRPM

## 2024-11-18 DIAGNOSIS — G89.29 ACUTE EXACERBATION OF CHRONIC LOW BACK PAIN: Primary | ICD-10-CM

## 2024-11-18 DIAGNOSIS — M54.50 ACUTE EXACERBATION OF CHRONIC LOW BACK PAIN: Primary | ICD-10-CM

## 2024-11-18 LAB
BILIRUB UR QL STRIP: NEGATIVE
CLARITY UR: CLEAR
COLOR UR: YELLOW
EXT PREGNANCY TEST URINE: NEGATIVE
EXT. CONTROL: NORMAL
GLUCOSE UR STRIP-MCNC: NEGATIVE MG/DL
HGB UR QL STRIP.AUTO: NEGATIVE
KETONES UR STRIP-MCNC: NEGATIVE MG/DL
LEUKOCYTE ESTERASE UR QL STRIP: NEGATIVE
NITRITE UR QL STRIP: NEGATIVE
PH UR STRIP.AUTO: 6.5 [PH]
PROT UR STRIP-MCNC: NEGATIVE MG/DL
SP GR UR STRIP.AUTO: 1.02 (ref 1–1.03)
UROBILINOGEN UR STRIP-ACNC: <2 MG/DL

## 2024-11-18 PROCEDURE — 99284 EMERGENCY DEPT VISIT MOD MDM: CPT | Performed by: EMERGENCY MEDICINE

## 2024-11-18 PROCEDURE — 99283 EMERGENCY DEPT VISIT LOW MDM: CPT

## 2024-11-18 PROCEDURE — 81003 URINALYSIS AUTO W/O SCOPE: CPT | Performed by: EMERGENCY MEDICINE

## 2024-11-18 PROCEDURE — 81025 URINE PREGNANCY TEST: CPT | Performed by: EMERGENCY MEDICINE

## 2024-11-18 RX ORDER — KETOROLAC TROMETHAMINE 30 MG/ML
15 INJECTION, SOLUTION INTRAMUSCULAR; INTRAVENOUS ONCE
Status: COMPLETED | OUTPATIENT
Start: 2024-11-18 | End: 2024-11-18

## 2024-11-18 RX ORDER — METHOCARBAMOL 500 MG/1
1000 TABLET, FILM COATED ORAL
Qty: 7 TABLET | Refills: 0 | Status: SHIPPED | OUTPATIENT
Start: 2024-11-18

## 2024-11-18 RX ORDER — NAPROXEN 500 MG/1
500 TABLET ORAL 2 TIMES DAILY WITH MEALS
Qty: 30 TABLET | Refills: 0 | Status: SHIPPED | OUTPATIENT
Start: 2024-11-18

## 2024-11-18 RX ORDER — METHOCARBAMOL 500 MG/1
1000 TABLET, FILM COATED ORAL ONCE
Status: COMPLETED | OUTPATIENT
Start: 2024-11-18 | End: 2024-11-18

## 2024-11-18 RX ADMIN — METHOCARBAMOL TABLETS 1000 MG: 500 TABLET, COATED ORAL at 05:42

## 2024-11-18 RX ADMIN — KETOROLAC TROMETHAMINE 15 MG: 30 INJECTION, SOLUTION INTRAMUSCULAR; INTRAVENOUS at 05:42

## 2024-11-18 NOTE — DISCHARGE INSTRUCTIONS
You have been seen for back pain. Please take naproxen and robaxin as needed for pain. Return to the emergency department if you develop worsening pain, weakness/numbness, incontinence, fevers or any other symptoms of concern. Please follow up with comprehensive spine by calling the number provided.

## 2024-11-18 NOTE — ED PROVIDER NOTES
"Time reflects when diagnosis was documented in both MDM as applicable and the Disposition within this note       Time User Action Codes Description Comment    11/18/2024  5:36 AM Nicola Bynum Add [M54.50,  G89.29] Acute exacerbation of chronic low back pain           ED Disposition       ED Disposition   Discharge    Condition   Stable    Date/Time   Mon Nov 18, 2024  5:36 AM    Comment   Luciana Orozco discharge to home/self care.                   Assessment & Plan       Medical Decision Making    19 y.o. female presenting for acute on chronic back pain.  VSS.  No red flag symptoms. Do not suspect spinal cord pathology.  Patient with known history of chronic pain attributed to scoliosis and symptoms similar to prior.  Will check UA to exclude pyelonephritis.  Will treat symptomatically.    I have discussed with the patient our plan to discharge them from the ED and the patient is in agreement with this plan. The patient was provided a written after visit summary with strict RTED precautions.     Discharge Plan: Rx for naproxen, robaxin, referral placed for comprehensive spine f/u.    Followup: I have discussed with the patient plan to follow up with comprehensive [facility]. Contact information provided in AVS.    Amount and/or Complexity of Data Reviewed  Labs: ordered.    Risk  Prescription drug management.             Medications       ED Risk Strat Scores             CRAFFT      Flowsheet Row Most Recent Value   CRAFFT Initial Screen: During the past 12 months, did you:    1. Drink any alcohol (more than a few sips)?  Yes Filed at: 11/18/2024 0519   2. Smoke any marijuana or hashish Yes Filed at: 11/18/2024 0519   3. Use anything else to get high? (\"anything else\" includes illegal drugs, over the counter and prescription drugs, and things that you sniff or 'green')? No Filed at: 11/18/2024 0519   CRAFFT Full Screen: During the past 12 months:    1. Have you ever ridden in a car driven by someone (including " "yourself) who was \"high\" or had been using alcohol or drugs? 0 Filed at: 11/18/2024 0519   2. Do you ever use alcohol or drugs to relax, feel better about yourself, or fit in? 0 Filed at: 11/18/2024 0519   3. Do you ever use alcohol/drugs while you are by yourself, alone? 0 Filed at: 11/18/2024 0519   4. Do you ever forget things you did while using alcohol or drugs? 0 Filed at: 11/18/2024 0519   5. Do your family or friends ever tell you that you should cut down on your drinking or drug use? 0 Filed at: 11/18/2024 0519   6. Have you gotten into trouble while you were using alcohol or drugs? 0 Filed at: 11/18/2024 0519   CRAFFT Score 0 Filed at: 11/18/2024 0519                                          History of Present Illness       Chief Complaint   Patient presents with    Back Pain     Pt states that she has chronic back pain is suppose to have surgery for her back in December. Pt states that she normally takes tylenol but not working. Pt states that she has been feeling uncomfortable        Past Medical History:   Diagnosis Date    Anxiety     Asthma     Depression     Eczema       Past Surgical History:   Procedure Laterality Date    WISDOM TOOTH EXTRACTION        Family History   Problem Relation Age of Onset    No Known Problems Father     Hashimoto's thyroiditis Brother     Liver cancer Maternal Grandfather     Lung cancer Maternal Grandfather       Social History     Tobacco Use    Smoking status: Never    Smokeless tobacco: Current    Tobacco comments:     Luciana Vape daily    Vaping Use    Vaping status: Every Day    Substances: Nicotine, THC, Flavoring   Substance Use Topics    Alcohol use: Yes     Comment: rarely    Drug use: Yes     Types: Marijuana, Psilocybin     Comment: daily marijuana; mushroom 3-4 months ago      E-Cigarette/Vaping    E-Cigarette Use Current Every Day User       E-Cigarette/Vaping Substances    Nicotine Yes     THC Yes     CBD No     Flavoring Yes     Other No     Unknown No  "      I have reviewed and agree with the history as documented.     Luciana Orozco is a 19 y.o. year old female with PMH of scheuermann kyphosis presenting to the [facility] ED for low back pain. Patient has had 3-4 weeks of worsening of chronic back pain. Patient was working at [employer] and believes this exacerbated her symptoms. Patient awoke with pain this morning prompting ED evaluation. No fevers/chills. She had nausea which she attributed to pain severity. Pain is constant and nonradiating. No weakness in extremities. No bowel/bladder incontinence. No weight loss. Patient denies associated saddle anesthesia. No abdominal pain. Patient following with Dr. Ashraf for symptoms and previously complete physical therapy. She was scheduled for spinal fusion next month though surgery was postponed. The patient has taken tylenol and NSAIDs at home for symptomatic treatment.        History provided by:  Medical records and patient   used: No    Back Pain  Associated symptoms: no abdominal pain, no chest pain, no dysuria, no fever, no numbness and no weakness        Review of Systems   Constitutional:  Negative for chills and fever.   Respiratory:  Negative for shortness of breath.    Cardiovascular:  Negative for chest pain.   Gastrointestinal:  Positive for nausea. Negative for abdominal pain and vomiting.   Genitourinary:  Negative for difficulty urinating, dysuria and flank pain.   Musculoskeletal:  Positive for back pain.   Neurological:  Negative for weakness and numbness.   All other systems reviewed and are negative.          Objective       ED Triage Vitals   Temperature Pulse Blood Pressure Respirations SpO2 Patient Position - Orthostatic VS   11/18/24 0518 11/18/24 0518 11/18/24 0518 11/18/24 0518 11/18/24 0518 --   97.8 °F (36.6 °C) 86 134/82 18 97 %       Temp Source Heart Rate Source BP Location FiO2 (%) Pain Score    11/18/24 0518 11/18/24 0530 -- -- 11/18/24 0542    Temporal  Monitor   9      Vitals      Date and Time Temp Pulse SpO2 Resp BP Pain Score FACES Pain Rating User   11/18/24 0542 -- -- -- -- -- 9 -- KM   11/18/24 0530 -- 83 98 % 16 141/88 -- -- KM   11/18/24 0518 97.8 °F (36.6 °C) 86 97 % 18 134/82 -- -- LD            Physical Exam  Vitals and nursing note reviewed.   Constitutional:       General: She is not in acute distress.     Appearance: Normal appearance. She is well-developed. She is not ill-appearing, toxic-appearing or diaphoretic.   HENT:      Head: Normocephalic and atraumatic.      Nose: No congestion or rhinorrhea.   Eyes:      General:         Right eye: No discharge.         Left eye: No discharge.   Cardiovascular:      Rate and Rhythm: Normal rate and regular rhythm.   Pulmonary:      Effort: Pulmonary effort is normal. No respiratory distress.      Breath sounds: Normal breath sounds. No wheezing or rales.   Abdominal:      Palpations: Abdomen is soft.      Tenderness: There is no abdominal tenderness. There is no right CVA tenderness, left CVA tenderness, guarding or rebound.   Musculoskeletal:      Cervical back: Normal range of motion. No rigidity.      Thoracic back: Tenderness present.      Lumbar back: Tenderness present.   Skin:     General: Skin is warm.      Capillary Refill: Capillary refill takes less than 2 seconds.   Neurological:      Mental Status: She is alert and oriented to person, place, and time.      Sensory: Sensation is intact.      Motor: Motor function is intact.      Comments: 5/5 strength b/l LE.  Sensation grossly intact throughout.     Psychiatric:         Mood and Affect: Mood normal.         Behavior: Behavior normal.         Results Reviewed       Procedure Component Value Units Date/Time    UA w Reflex to Microscopic w Reflex to Culture [[id number]] Collected: 11/18/24 0528    Lab Status: Final result Specimen: Urine, Clean Catch Updated: 11/18/24 0534     Color, UA Yellow     Clarity, UA Clear     Specific Gravity, UA 1.020      pH, UA 6.5     Leukocytes, UA Negative     Nitrite, UA Negative     Protein, UA Negative mg/dl      Glucose, UA Negative mg/dl      Ketones, UA Negative mg/dl      Urobilinogen, UA <2.0 mg/dl      Bilirubin, UA Negative     Occult Blood, UA Negative    POCT pregnancy, urine [[id number]]  (Normal) Collected: 24    Lab Status: Final result Updated: 24     EXT Preg Test, Ur Negative     Control Valid            No orders to display       Procedures    ED Medication and Procedure Management   Prior to Admission Medications   Prescriptions Last Dose Informant Patient Reported? Taking?   ARIPiprazole (ABILIFY) 5 mg tablet  Self No No   Sig: Aripiprazole 5m 1/2  tablet po  daily   Patient not taking: Reported on 2024   FLUoxetine (PROzac) 40 MG capsule  Self No No   Sig: Fluoxetine 40m capsules in the morning   Patient not taking: Reported on 2024   cholecalciferol (VITAMIN D3) 1,000 units tablet  Self No No   Sig: Take 1 tablet (1,000 Units total) by mouth daily Do not start before 2023.   Patient not taking: Reported on 2024   ergocalciferol (VITAMIN D2) 50,000 units   No No   Sig: Take 1 capsule (50,000 Units total) by mouth once a week   hydrocortisone 2.5 % cream  Self Yes No   Sig: Apply 2.5 Applications topically if needed   Patient not taking: Reported on 2024   levonorgestrel-ethinyl estradiol (Chapis) 90-20 MCG per tablet  Self No No   Sig: Take 1 tablet by mouth daily   Patient not taking: Reported on 2024   lidocaine (LIDODERM) 5 %  Self No No   Sig: Apply 1 patch topically over 12 hours daily Remove & Discard patch within 12 hours or as directed by MD Do not start before 2023.   Patient not taking: Reported on 2024   naltrexone (REVIA) 50 mg tablet  Self No No   Sig: Naltrexone 50m tablet po daily   Patient not taking: Reported on 2024   traZODone (DESYREL) 50 mg tablet   No No   Sig: Take 1 tablet (50 mg total) by mouth  daily at bedtime   Patient not taking: Reported on 2024      Facility-Administered Medications: None     Discharge Medication List as of 2024  5:37 AM        START taking these medications    Details   methocarbamol (ROBAXIN) 500 mg tablet Take 2 tablets (1,000 mg total) by mouth daily at bedtime as needed for muscle spasms, Starting 2024, Normal      naproxen (Naprosyn) 500 mg tablet Take 1 tablet (500 mg total) by mouth 2 (two) times a day with meals, Starting 2024, Normal           CONTINUE these medications which have NOT CHANGED    Details   ARIPiprazole (ABILIFY) 5 mg tablet Aripiprazole 5m 1/2  tablet po  daily, Normal      cholecalciferol (VITAMIN D3) 1,000 units tablet Take 1 tablet (1,000 Units total) by mouth daily Do not start before 2023., Starting 2023, Normal      ergocalciferol (VITAMIN D2) 50,000 units Take 1 capsule (50,000 Units total) by mouth once a week, Starting 2024, Normal      FLUoxetine (PROzac) 40 MG capsule Fluoxetine 40m capsules in the morning, Normal      hydrocortisone 2.5 % cream Apply 2.5 Applications topically if needed, Starting 2023, Historical Med      levonorgestrel-ethinyl estradiol (Chapis) 90-20 MCG per tablet Take 1 tablet by mouth daily, Starting 2023, Until 2024, Normal      lidocaine (LIDODERM) 5 % Apply 1 patch topically over 12 hours daily Remove & Discard patch within 12 hours or as directed by MD Do not start before 2023., Starting Thu 3/9/2023, Normal      naltrexone (REVIA) 50 mg tablet Naltrexone 50m tablet po daily, Normal      traZODone (DESYREL) 50 mg tablet Take 1 tablet (50 mg total) by mouth daily at bedtime, Starting Wed 3/8/2023, Until 2023, Normal             ED SEPSIS DOCUMENTATION   Time reflects when diagnosis was documented in both MDM as applicable and the Disposition within this note       Time User Action Codes Description Comment     11/18/2024  5:36 AM Nicola Bynum Add [M54.50,  G89.29] Acute exacerbation of chronic low back pain                  Nicola Bynum,   11/18/24 0709

## 2024-11-19 ENCOUNTER — NURSE TRIAGE (OUTPATIENT)
Dept: PHYSICAL THERAPY | Facility: OTHER | Age: 19
End: 2024-11-19

## 2024-11-19 NOTE — TELEPHONE ENCOUNTER
Additional Information   Negative: Has the patient had unexplained weight loss?   Negative: Does the patient have a fever?   Negative: Is the patient experiencing blood in sputum?   Negative: Has the patient experienced major trauma? (fall from height, high speed collision, direct blow to spine) and is also experiencing nausea, light-headedness, or loss of consciousness?   Negative: Is the patient experiencing urine retention?   Negative: Is the patient experiencing acute drop foot or paralysis?   Negative: Is this a chronic condition?    Protocols used: [facility] Protocol  Nurse completed the triage and NO RF s/s were present. Referral entered for the [place] site and the contact/phone number information was given to the patient as well.   Patients information was sent to the preferred site and pt made aware clerical would be calling to schedule the evaluation appointment. Nurse encouraged her to call the site if she does not hear from clerical beforehand. Patient Agreed.    Patient did not voice any additional questions or concerns at this time.   Patient is aware current/past complaints, relevant dx, additional referrals and treatment/options will be discussed at time of evaluation/consultation appointment.   Patient is in agreement with plan.  Patient very appreciative of CB and referral placement.     Nurse wished her well and the cs referral was closed.

## 2024-11-19 NOTE — TELEPHONE ENCOUNTER
Additional Information   Negative: Is this related to a work injury?   Negative: Is this related to an MVA?   Negative: Are you currently recieving homecare services?    Background - Initial Assessment  Clinical complaint: pain is center mid to low back. Has on and off numbness in bilat legs. Started 8 years ago. This flare up started approx 1 month ago, where pain has worsened. NKI Pt feels it was just from working more hours. No prior back SX. However patient is following Ortho/ Dr Padilla and had SX scheduled for 12/3/24, he had to cx due to his schedule. She is awaiting Ortho to call her with a new date. Pain is constant and feels stabbing and pinching. Seen in ED 11/18/24  Date of onset: 1 month  Frequency of pain: constant  Quality of pain: stabbing and pinching    Protocols used: [facility] Protocol

## 2024-11-21 ENCOUNTER — OFFICE VISIT (OUTPATIENT)
Dept: BEHAVIORAL/MENTAL HEALTH CLINIC | Facility: CLINIC | Age: 19
End: 2024-11-21
Payer: COMMERCIAL

## 2024-11-21 DIAGNOSIS — F41.1 GENERALIZED ANXIETY DISORDER: Primary | ICD-10-CM

## 2024-11-21 DIAGNOSIS — F98.8 ATTENTION DEFICIT DISORDER PREDOMINANT INATTENTIVE TYPE: ICD-10-CM

## 2024-11-21 PROCEDURE — 90834 PSYTX W PT 45 MINUTES: CPT

## 2024-11-21 NOTE — BH TREATMENT PLAN
Outpatient Behavioral Health Psychotherapy Treatment Plan    Luciana Orozco  2005     Date of Initial Psychotherapy Assessment: 11/21/2024   Date of Current Treatment Plan: 11/21/24  Treatment Plan Target Date: TBD   Treatment Plan Expiration Date: 5/21/2025    Diagnosis:   1. Generalized anxiety disorder            Area(s) of Need: anger, emotional regulation, coping skills    Long Term Goal 1 (in the client's own words): I want to find skills and ways to work on controlling my anger and not putting it on others     Stage of Change: Preparation    Target Date for completion: 5/21/2025     Anticipated therapeutic modalities: CBT, Talk Therapy     People identified to complete this goal: Myself, Therapist       Objective 1: (identify the means of measuring success in meeting the objective): Identifying what triggers me to feel anger.      Objective 2: (identify the means of measuring success in meeting the objective): Identifying and exploring coping skills to be used when I am feeling angry.          I am currently under the care of a [facility] psychiatric provider: no    My [facility] psychiatric provider is: N/A    I am currently taking psychiatric medications: No    I feel that I will be ready for discharge from mental health care when I reach the following (measurable goal/objective): When I feel angry, I will utilize my coping skills 80% of the time.     For children and adults who have a legal guardian:   Has there been any change to custody orders and/or guardianship status? NA. If yes, attach updated documentation.    I have created my Crisis Plan and have been offered a copy of this plan    Behavioral Health Treatment Plan [facility]: Diagnosis and Treatment Plan explained to Luciana Orozco acknowledges an understanding of their diagnosis. Luciana Orozco agrees to this treatment plan.    I have been offered a copy of this Treatment Plan. yes

## 2024-11-21 NOTE — PSYCH
"Behavioral Health Psychotherapy Assessment    Date of Initial Psychotherapy Assessment: 11/21/24  Referral Source: myself  Has a release of information been signed for the referral source? NA    Preferred Name: Luciana Orozco  Preferred Pronouns: She/her  YOB: 2005 Age: 19 y.o.  Sex assigned at birth: female   Gender Identity: Female  Race:   Preferred Language: English      Primary Care Physician:  Bonita Cuellar MD  [address] [place] [address]  [phone]  Has a release of information been signed? Yes      Relevant Family History:  I live with my grandma(oliva), brother, and boyfriend Wesley. Mom and youngest brother moved out with section 8 voucher.  My family all has mental health and get support for that.     Presenting Problem (What brings you in?)  I had struggled with my mental health about a month ago- I feel like I am feeling a bit better now. Still struggling with ups and downs. I don't feel like I need meds as much, I have a good support- but feel that talking and therapy was best. I drove myself \"crazy\" working almost a month straight.     Mental Health Advance Directive:  Do you currently have a Mental Health Advance Directive?no    Diagnosis:   Diagnosis ICD-10-CM Associated Orders   1. Generalized anxiety disorder  F41.1       2. Attention deficit disorder predominant inattentive type  F98.8           Initial Assessment:     Current Mental Status:    Appearance: appropriate      Behavior/Manner: cooperative      Affect/Mood:  Good, happy, anxious, depressed, euphoric, hopeful, relaxed and stable    Speech:  Normal    Sleep:  Interrupted    Oriented to: oriented to self, oriented to place and oriented to time       Clinical Symptoms    Depression: yes      Anxiety: yes      Depression Symptoms: restlessness, fatigue, sleep disturbance and irritable      Anxiety Symptoms: irritable, fear of losing control, restlessness and hot flashes      Have you ever been assaultive " to others or the environment: No      Have you ever been self-injurious: Yes    Additional Abuse/Self Harm history:  Not actively engaging in self-injurious behavior. I have not engaged in the last 5 months- have not felt that upset.     Counseling History:  Previous Counseling or Treatment  (Mental Health or Drug & Alcohol): Yes    Previous Counseling Details:  Previous therapist left the practice. Have [facility] in the home, other counseling supports such inpatient and partial programs  Have you previously taken psychiatric medications: Yes      Suicide Risk Assessment  Have you ever had a suicide attempt: Yes    Have you had incidents of suicidal ideation: Yes    Are you currently experiencing suicidal thoughts: No    Additional Suicide Risk Information:  I received psychiatric support- long time ago, many years prior.     Substance Abuse/Addiction Assessment:  Alcohol: Yes    Age of First Use:  Middle school  Age of regular use:  18  Frequency:  Other and weekly  Amount:  1 drink  Last use:  Last week  Heroin: No    Fentanyl: No    Opiates: No    Cocaine: No    Amphetamines: No    Hallucinogens: No    Club Drugs: No    Benzodiazepines: No    Other Rx Meds: No    Marijuana: Yes    Age of First Use:  16-18  Age of regular use:  17  Frequency:  Daily  Method:  Smoke/pipe  Last Use:  Today  Tobacco/Nicotine: Yes    Age of First Use:  13  Age of regular use:  15-17  Frequency:  Daily  Method:  Smoke/pipe  Last Use:  Today  Are you interested in resources for smoking cessation: No    Have you experienced blackouts as a result of substance use: No    Have you had any periods of abstinence: Yes    Additional Abstinence information:  Times where I didn't feel like using it.   Have you experienced symptoms of withdrawal: No    Have you ever overdosed on any substances?: No    Are you currently using any Medication Assisted Treatment for Substance Use: No      Compulsive Behaviors:  Compulsive Behaviors:  Shopping  Compulsive  "Behavior Information:  \"It's bad\"- I use my paycheck up when I get it- sometimes have to \"dip\" into my savings.     Disordered Eating History:  Do you have a history of disordered eating: No      Social Determinants of Health:    SDOH:  Financial instability    Trauma and Abuse History:    Have you ever been abused: No      Legal History:    Have you ever been arrested  or had a DUI: No      Have you been incarcerated: No      Are you currently on parole/probation: No      Any current Children and Youth involvement: No      Any pending legal charges: No      Relationship History:    Current marital status: single      Natural Supports:  Other, mother and friends    Other natural supports:  Catarina    Relationship History:  Mother has been and in and out of my life  Catarina had custody of me,   Friends are supportive.    Employment History    Are you currently employed: Yes      Longest period of employment:  2 years    Employer/ Job title:  [employer]    Future work goals:  Kindergartern teacher or     Sources of income/financial support:  Work     History:      Status: no history of  duty  Educational History:     Have you ever been diagnosed with a learning disability: No      Highest level of education:  High school graduate    School attended/attending:  [employer]    Have you ever had an IEP or 504-plan: Yes      IEP/504 plan:  Emotional Disturbance    Do you need assistance with reading or writing: No      Recommended Treatment:     Psychotherapy:  Individual sessions    Frequency:  2 times    Session frequency:  Monthly      Visit start and stop times:    24  Start Time: 1102  Stop Time: 1154  Total Visit Time: 52 minutes  "

## 2024-12-05 NOTE — PSYCH
30yo now postpartum day 1 from a  c/b postpartum bleeding (total +312) due to JORDON atony, recovering well.     Plan:  #Postpartum bleeding   - s/p sono with evidence of thin stripe and no evidence of clots   - Stable post delivery CBC     #Postpartum  - Continue scheduled Ibuprofen and Acetaminophen for pain, Oxycodone available PRN for breakthrough pain.  - Increase ambulation, SCDs when not ambulating  - Continue regular diet    Cass Fry, PGY-1   Virtual Regular Visit    Verification of patient location:    Patient is located in the following state in which I hold an active license [place]      Assessment/Plan:    Problem List Items Addressed This Visit        Other    Moderate episode of recurrent major depressive disorder ([facility] [address] ) - Primary    Generalized anxiety disorder    Attention deficit disorder predominant inattentive type          Goals addressed in session: Goal 1          Reason for visit is No chief complaint on file  Encounter provider Fuad Figueroa, [facility] \[facility] [place] [facility]\""    Provider located at [address] [place] [address]  [phone]      Recent Visits  No visits were found meeting these conditions  Showing recent visits within past 7 days and meeting all other requirements  Future Appointments  No visits were found meeting these conditions  Showing future appointments within next 150 days and meeting all other requirements       The patient was identified by name and date of birth  [employer] was informed that this is a telemedicine visit and that the visit is being conducted through[employer] and patient was informed that this is a secure, HIPAA-compliant platform  She agrees to proceed     My office door was closed  No one else was in the room  She acknowledged consent and understanding of privacy and security of the video platform  The patient has agreed to participate and understands they can discontinue the visit at any time  Patient is aware this is a billable service  Sydnee Rodriguez is a 24 y o  female   D:  -CT reported her mother moved out of the house without telling anyone officially  At first mother stated that she was "staying over night" which extended to "moving out"   CT noted that this was a hard adjustment initially (worried she is ok, hopes she will not relapse, felt hurt that she did not communicate with her)     -TH invited CT describe any positive elements of this change  CT talked about having a little more freedom to interact with family  (aunt-spent time at their home and father), not having to deal with mother's changing and unreliable moods on a day to day basis  -CT has been in touch with her father over the past week via text/phone  CT hopes to be able to be in touch more regularly now that mother is gone    -Discussed and addressed CT's cutting behavioral CT reported 8 days with no cutting  CT stated that she is not always sure "why or motivation" to cut  CT cuts when in her room  [address] invited CT to explore pros/cons and reasons why she wants to stop  Discussed disposing of existing blades  CT stated that she will "think about" getting rid of them  CT uses I am sober rehan that prompts her to journal  [address] suggested adding Calm Harm rehan that contains options and in the moment tools when urges arise  CT forgot that she has rehan  CT will move rehan to home screen for easier access  CT stated that [employer] is asking to see her arms and monitoring which she dislikes  -CT reported that she started a new class and having some initial struggles with work load  Discussed CT's accommodations  CT does not yet have her accommodations in place and agreed that these could be helpful  A:  CT presented with no SI, HI, self-harm, or DnA challenges  CT mood was fair to good  CT has added schedule commitments (job, additional class) and school work pressures  CT's mother leaving home was initially difficult but has potential positive long-term effects by limiting pressure at home  CT has fair insight into her cutting motivations with desire to stop; fears hurting herself/infection  TH offered empathy, reflective listening, and normalization of challenges to support      P:  CT will contact psych to address increased anxiety and [place]  CT will use calm harm rehan (move to more visible place on [address])  CT will give priority to eating regularly, enough sleep  CT will explore getting her accommodations in place for school  Jairo CHAVARRIA     Past Medical History:   Diagnosis Date   • Anxiety    • Asthma    • Depression        Past Surgical History:   Procedure Laterality Date   • WISDOM TOOTH EXTRACTION         Current Outpatient Medications   Medication Sig Dispense Refill   • FLUoxetine (PROzac) 40 MG capsule Take 1 capsule (40 mg total) by mouth in the morning 90 capsule 0   • guanFACINE HCl ER (INTUNIV) 4 MG TB24 Take 1 tablet (4 mg total) by mouth daily at bedtime for 90 doses 90 tablet 0   • levonorgestrel-ethinyl estradiol (Chapis) 90-20 MCG per tablet Take 1 tablet by mouth daily 84 tablet 0   • levonorgestrel-ethinyl estradiol (Chapis) 90-20 MCG per tablet Take 1 tablet by mouth daily 90 tablet 3   • [START ON 10/18/2022] methylphenidate (Concerta) 54 MG ER tablet Take 1 tablet (54 mg total) by mouth daily Max Daily Amount: 54 mg 30 tablet 0   • methylphenidate (Concerta) 54 MG ER tablet Take 1 tablet (54 mg total) by mouth daily Max Daily Amount: 54 mg 30 tablet 0   • propantheline (PROBANTHINE) 15 mg tablet Take 15 mg by mouth 4 (four) times a day   (Patient not taking: Reported on 9/21/2022)       No current facility-administered medications for this visit  Allergies   Allergen Reactions   • Benadryl [Diphenhydramine] Hyperactivity   • Mangifera Indica Itching   • Pineapple - Food Allergy Itching       Review of Systems    Video Exam    There were no vitals filed for this visit      Physical Exam     Visit Time    Visit Start Time: 11:05am  Visit Stop Time: 11:57am  Total Visit Duration: 52 miniutes 30yo now postpartum day 2 from a  c/b postpartum bleeding (total +312) due to JORDON atony, recovering well.     Plan:  #Postpartum bleeding   - s/p sono with evidence of thin stripe and no evidence of clots   - Stable post delivery CBC     #Postpartum  - Continue scheduled Ibuprofen and Acetaminophen for pain, Oxycodone available PRN for breakthrough pain.  - Increase ambulation, SCDs when not ambulating  - Continue regular diet    Cass Fry, PGY-1

## 2024-12-12 ENCOUNTER — SOCIAL WORK (OUTPATIENT)
Dept: BEHAVIORAL/MENTAL HEALTH CLINIC | Facility: CLINIC | Age: 19
End: 2024-12-12
Payer: COMMERCIAL

## 2024-12-12 ENCOUNTER — TELEPHONE (OUTPATIENT)
Age: 19
End: 2024-12-12

## 2024-12-12 DIAGNOSIS — F31.60 BIPOLAR AFFECTIVE DISORDER, CURRENT EPISODE MIXED, CURRENT EPISODE SEVERITY UNSPECIFIED (HCC): ICD-10-CM

## 2024-12-12 DIAGNOSIS — F41.1 GENERALIZED ANXIETY DISORDER: Primary | ICD-10-CM

## 2024-12-12 PROCEDURE — 90837 PSYTX W PT 60 MINUTES: CPT

## 2024-12-12 NOTE — TELEPHONE ENCOUNTER
Caller: Patient     Doctor: Randy     Reason for call: Asked for a call by surgery coordinator     Call back#: [phone]

## 2024-12-12 NOTE — PSYCH
Behavioral Health Psychotherapy Progress Note    Psychotherapy Provided: Individual Psychotherapy     1. Generalized anxiety disorder        2. Bipolar affective disorder, current episode mixed, current episode severity unspecified (HCC)            Goals addressed in session: Goal 1     DATA: Client and therapist met in office for session. Client and therapist began with checking in from last session. Client shared doing well overall, shared her surgery that was cancelled, was not rescheduled and shared anxiety if doctor was coming back to the practice. Client shared concerns that her back was hurting more. Therapist and client brainstormed ideas such as calling the doctors office to see about rescheduling herself or sending a note to the doctors office over the portal. Client and therapist then discussed about her wanting to go back to her school and register for classes for early- childhood education. Client and therapist discussed process from wanting to work as a  to then early education. 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 Client and therapist then discussed upcoming family holiday plans as well as plans with her boyfriend.   During this session, this clinician used the following therapeutic modalities: Client-centered Therapy    Substance Abuse was not addressed during this session. If the client is diagnosed with a co-occurring substance use disorder, please indicate any changes in the frequency or amount of use:  Stage of change for addressing substance use diagnoses: No substance use/Not applicable    ASSESSMENT:  Luciana Orozco presents with a Euthymic/ normal mood.     her affect is Normal range and intensity, which is congruent, with her mood and the content of the session. The client has made progress on their goals.     Luciana Orozco presents with a none risk of suicide, none risk of self-harm, and none risk of harm to others.    For  "any risk assessment that surpasses a \"low\" rating, a safety plan must be developed.    A safety plan was indicated: no  If yes, describe in detail will create next session    PLAN: Between sessions, Luciana Orozco will work on registering for classes, call to check in on rescheduling surgery. At the next session, the therapist will use Client-centered Therapy to address school anxiety.    Behavioral Health Treatment Plan and Discharge Planning: Luciana Orozco is aware of and agrees to continue to work on their treatment plan. They have identified and are working toward their discharge goals. yes    Depression Follow-up Plan Completed: No    Visit start and stop times:    12/13/24  Start Time: 1100  Stop Time: 1153  Total Visit Time: 53 minutes  "

## 2024-12-17 ENCOUNTER — TELEPHONE (OUTPATIENT)
Dept: OBGYN CLINIC | Facility: HOSPITAL | Age: 19
End: 2024-12-17

## 2025-01-09 ENCOUNTER — SOCIAL WORK (OUTPATIENT)
Dept: BEHAVIORAL/MENTAL HEALTH CLINIC | Facility: CLINIC | Age: 20
End: 2025-01-09
Payer: COMMERCIAL

## 2025-01-09 DIAGNOSIS — F41.1 GENERALIZED ANXIETY DISORDER: Primary | ICD-10-CM

## 2025-01-09 DIAGNOSIS — F98.8 ATTENTION DEFICIT DISORDER PREDOMINANT INATTENTIVE TYPE: ICD-10-CM

## 2025-01-09 PROCEDURE — 90834 PSYTX W PT 45 MINUTES: CPT

## 2025-01-09 NOTE — PSYCH
"Behavioral Health Psychotherapy Progress Note    Psychotherapy Provided: Individual Psychotherapy     1. Generalized anxiety disorder        2. Attention deficit disorder predominant inattentive type            Goals addressed in session: Goal 1     DATA: client and therapist met in office for session. Client and therapist discussed how client was doing. Client shared doing well but wanted to discuss leaving her job. Client shared that she is unhappy with her job and wanting to stop working there. Client reported with back surgery coming up too, did not want to hurt her back more, reported that she feels like she is having to do more work than others. Client and therapist processed this and agreed that she should not push it more with her back. 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 Client and therapist discussed ideas for her resignation letter too.   During this session, this clinician used the following therapeutic modalities: Client-centered Therapy    Substance Abuse was not addressed during this session. If the client is diagnosed with a co-occurring substance use disorder, please indicate any changes in the frequency or amount of use:  Stage of change for addressing substance use diagnoses: No substance use/Not applicable    ASSESSMENT:  Luciana Orozco presents with a Euthymic/ normal mood.     her affect is Normal range and intensity, which is congruent, with her mood and the content of the session. The client has made progress on their goals.     Luciana Orozco presents with a none risk of suicide, none risk of self-harm, and none risk of harm to others.    For any risk assessment that surpasses a \"low\" rating, a safety plan must be developed.    A safety plan was indicated: no  If yes, describe in detail crisis plan on file    PLAN: Between sessions, Luciana Orozco will continue to " identify triggers. At the next session, the therapist will use Client-centered Therapy to address anger.    Behavioral Health Treatment Plan and Discharge Planning: Luciana Orozco is aware of and agrees to continue to work on their treatment plan. They have identified and are working toward their discharge goals. yes    Depression Follow-up Plan Completed: No    Visit start and stop times:    01/09/25  Start Time: 1100  Stop Time: 1150  Total Visit Time: 50 minutes

## 2025-01-21 ENCOUNTER — TELEPHONE (OUTPATIENT)
Age: 20
End: 2025-01-21

## 2025-01-21 NOTE — TELEPHONE ENCOUNTER
Patient called and requested a sooner appointment w/therapist. Patient was crying and upset and wanted provider to give her a call back.

## 2025-01-22 ENCOUNTER — TELEPHONE (OUTPATIENT)
Dept: BEHAVIORAL/MENTAL HEALTH CLINIC | Facility: CLINIC | Age: 20
End: 2025-01-22

## 2025-01-22 ENCOUNTER — TELEMEDICINE (OUTPATIENT)
Dept: BEHAVIORAL/MENTAL HEALTH CLINIC | Facility: CLINIC | Age: 20
End: 2025-01-22
Payer: COMMERCIAL

## 2025-01-22 DIAGNOSIS — F41.1 GENERALIZED ANXIETY DISORDER: Primary | ICD-10-CM

## 2025-01-22 PROCEDURE — 90834 PSYTX W PT 45 MINUTES: CPT
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
## 2025-01-22 NOTE — PSYCH
"Behavioral Health Psychotherapy Progress Note    Psychotherapy Provided: Individual Psychotherapy     1. Generalized anxiety disorder            Goals addressed in session: Goal 1     DATA: Client met with therapist virtually. Client shared that she was doing better today. Client shared that mobile crisis came out to the house, felt it was \"somewhat\" helpful and shared that her brother has not bothered her since. Client and therapist processed through the event, discussing it being a triggering event and that it \"triggered her trauma alarm\". Client shared that she ended up going home, spending time with boyfriend and shared that while she felt like she could not breathe, that her job would not let her go home. Client shared that she had already put her two weeks into her job and shared that she felt like just leaving after this happened and the response from her boss. Client and therapist discussed about responses as well as how people respond to certain events/peoples emotions. Client shared that her grandmother was better today, tried asking client what client needed and how to make the situation better. Client and therapist discussed brothers behaviors as possibly a feeling of being left out, etc. Too.   During this session, this clinician used the following therapeutic modalities: Client-centered Therapy    Substance Abuse was not addressed during this session. If the client is diagnosed with a co-occurring substance use disorder, please indicate any changes in the frequency or amount of use:  Stage of change for addressing substance use diagnoses: No substance use/Not applicable    ASSESSMENT:  Luciana Orozco presents with a Euthymic/ normal mood.     her affect is Normal range and intensity, which is congruent, with her mood and the content of the session. The client has made progress on their goals.     Luciana Orozco presents with a none risk of suicide, none risk of self-harm, and none risk of harm to " "others.    For any risk assessment that surpasses a \"low\" rating, a safety plan must be developed.    A safety plan was indicated: no  If yes, describe in detail on file    PLAN: Between sessions, Luciana Orozco will continue to explore triggers. At the next session, the therapist will use Client-centered Therapy to address anger.    Behavioral Health Treatment Plan and Discharge Planning: Luciana Orozco is aware of and agrees to continue to work on their treatment plan. They have identified and are working toward their discharge goals. yes    Depression Follow-up Plan Completed: No    Visit start and stop times:    01/23/25  Start Time: 1501  Stop Time: 1550  Total Visit Time: 49 minutes  Virtual Regular Visit    Verification of patient location:    Patient is located at Home in the following state in which I hold an active license [place]      Assessment/Plan:    Problem List Items Addressed This Visit          Behavioral Health    Generalized anxiety disorder - Primary       Goals addressed in session: Goal 1     Depression Follow-up Plan Completed: No    Reason for visit is   Chief Complaint   Patient presents with    Virtual Regular Visit        Encounter provider Patricia Aragon      Recent Visits  Date Type Provider Dept   01/22/25 Telephone Patricia Aragon [facility] [place] Therapist [facility]   01/22/25 Telemedicine Patricia Aragon [facility] [place] Therapist op   Showing recent visits within past 7 days and meeting all other requirements  Future Appointments  No visits were found meeting these conditions.  Showing future appointments within next 150 days and meeting all other requirements       The patient was identified by name and date of birth. Luciana Orozco was informed that this is a telemedicine visit and that the visit is being conducted throughthe Epic Embedded platform. She agrees to proceed..  My office door was closed. No one else was in the room.  She acknowledged consent and understanding of " privacy and security of the video platform. The patient has agreed to participate and understands they can discontinue the visit at any time.    Patient is aware this is a billable service.     Subjective  Luciana Orozco is a 19 y.o. adult  .      HPI     Past Medical History:   Diagnosis Date    Anxiety     Asthma     Depression     Eczema        Past Surgical History:   Procedure Laterality Date    WISDOM TOOTH EXTRACTION         Current Outpatient Medications   Medication Sig Dispense Refill    ARIPiprazole (ABILIFY) 5 mg tablet Aripiprazole 5m 1/2  tablet po  daily (Patient not taking: Reported on 2024) 45 tablet 1    cholecalciferol (VITAMIN D3) 1,000 units tablet Take 1 tablet (1,000 Units total) by mouth daily Do not start before 2023. (Patient not taking: Reported on 2024) 30 tablet 0    ergocalciferol (VITAMIN D2) 50,000 units Take 1 capsule (50,000 Units total) by mouth once a week 12 capsule 1    FLUoxetine (PROzac) 40 MG capsule Fluoxetine 40m capsules in the morning (Patient not taking: Reported on 2024) 60 capsule 1    hydrocortisone 2.5 % cream Apply 2.5 Applications topically if needed (Patient not taking: Reported on 2024)      levonorgestrel-ethinyl estradiol (Chapis) 90-20 MCG per tablet Take 1 tablet by mouth daily (Patient not taking: Reported on 2024) 90 tablet 4    lidocaine (LIDODERM) 5 % Apply 1 patch topically over 12 hours daily Remove & Discard patch within 12 hours or as directed by MD Do not start before 2023. (Patient not taking: Reported on 2024) 30 patch 0    methocarbamol (ROBAXIN) 500 mg tablet Take 2 tablets (1,000 mg total) by mouth daily at bedtime as needed for muscle spasms 7 tablet 0    naltrexone (REVIA) 50 mg tablet Naltrexone 50m tablet po daily (Patient not taking: Reported on 2024) 30 tablet 2    naproxen (Naprosyn) 500 mg tablet Take 1 tablet (500 mg total) by mouth 2 (two) times a day with meals 30  tablet 0    traZODone (DESYREL) 50 mg tablet Take 1 tablet (50 mg total) by mouth daily at bedtime (Patient not taking: Reported on 9/17/2024) 30 tablet 0     No current facility-administered medications for this visit.        Allergies   Allergen Reactions    Benadryl [Diphenhydramine] Hyperactivity    Mangifera Indica Itching     [place]     Pineapple - Food Allergy Itching       Review of Systems    Video Exam    There were no vitals filed for this visit.    Physical Exam

## 2025-01-29 ENCOUNTER — TELEPHONE (OUTPATIENT)
Age: 20
End: 2025-01-29

## 2025-01-29 NOTE — TELEPHONE ENCOUNTER
Pt called to get her appt for 1/30 at 11am switched to a virtual visit due to no transportation.  Pt has [employer] and will connect with provider through [employer]. Writer switched appt and checked it in.

## 2025-01-30 ENCOUNTER — TELEMEDICINE (OUTPATIENT)
Dept: BEHAVIORAL/MENTAL HEALTH CLINIC | Facility: CLINIC | Age: 20
End: 2025-01-30
Payer: COMMERCIAL

## 2025-01-30 DIAGNOSIS — F41.1 GENERALIZED ANXIETY DISORDER: Primary | ICD-10-CM

## 2025-01-30 PROCEDURE — 90837 PSYTX W PT 60 MINUTES: CPT

## 2025-01-30 NOTE — PSYCH
"Behavioral Health Psychotherapy Progress Note    Psychotherapy Provided: Individual Psychotherapy     1. Generalized anxiety disorder            Goals addressed in session: Goal 1     DATA: Client and therapist met virtually for session. Client shared doing well, shared now being at home and not working. Client shared that client enjoyed being home but also was concerned about routines and \"doing enough\". Therapist and client processed through what client wanted to do as their routine as well as to work on communicating with boyfriend too oh his wants/needs. Client and therapist discussed client was feeling much better since last session and shared even gaining the courage to complain to HR about her manager and then not working her last shift due to her manager not doing things right by policy and trying to blame on client. Client and therapist discussed fear and concerns of surgery as well as surgery being postponed again too.   During this session, this clinician used the following therapeutic modalities: Client-centered Therapy    Substance Abuse was not addressed during this session. If the client is diagnosed with a co-occurring substance use disorder, please indicate any changes in the frequency or amount of use:  Stage of change for addressing substance use diagnoses: No substance use/Not applicable    ASSESSMENT:  Luciana Orozco presents with a Euthymic/ normal mood.     her affect is Normal range and intensity, which is congruent, with her mood and the content of the session. The client has made progress on their goals.    Luicana Orozco presents with a none risk of suicide, none risk of self-harm, and none risk of harm to others.    For any risk assessment that surpasses a \"low\" rating, a safety plan must be developed.    A safety plan was indicated: no  If yes, describe in detail on file    PLAN: Between sessions, Luciana Orozco will continue to identify triggers and emotions, working on " building a routine. At the next session, the therapist will use Client-centered Therapy to address anxiety, anger, routines being home.    Behavioral Health Treatment Plan and Discharge Planning: Luciana Orozco is aware of and agrees to continue to work on their treatment plan. They have identified and are working toward their discharge goals. yes    Depression Follow-up Plan Completed: Not applicable    Visit start and stop times:    01/31/25  Start Time: 1101  Stop Time: 1155  Total Visit Time: 54 minutes  Virtual Regular Visit    Verification of patient location:    Patient is located at Home in the following state in which I hold an active license [place]      Assessment/Plan:    Problem List Items Addressed This Visit          Behavioral Health    Generalized anxiety disorder - Primary       Goals addressed in session: Goal 1     Depression Follow-up Plan Completed: Not applicable    Reason for visit is   Chief Complaint   Patient presents with    Virtual Regular Visit        Encounter provider Patricia Aragon      Recent Visits  Date Type Provider Dept   01/30/25 Telemedicine Patricia Aragon [facility] Therapist [facility]   Showing recent visits within past 7 days and meeting all other requirements  Future Appointments  No visits were found meeting these conditions.  Showing future appointments within next 150 days and meeting all other requirements       The patient was identified by name and date of birth. Luciana Orozco was informed that this is a telemedicine visit and that the visit is being conducted throughthe Epic Embedded platform. She agrees to proceed..  My office door was closed. No one else was in the room.  She acknowledged consent and understanding of privacy and security of the video platform. The patient has agreed to participate and understands they can discontinue the visit at any time.    Patient is aware this is a billable service.     Subjective  Luciana Orozco is a 19 y.o. adult  .      HPI     Past Medical History:   Diagnosis Date    Anxiety     Asthma     Depression     Eczema        Past Surgical History:   Procedure Laterality Date    WISDOM TOOTH EXTRACTION         Current Outpatient Medications   Medication Sig Dispense Refill    ARIPiprazole (ABILIFY) 5 mg tablet Aripiprazole 5m 1/2  tablet po  daily (Patient not taking: Reported on 2024) 45 tablet 1    cholecalciferol (VITAMIN D3) 1,000 units tablet Take 1 tablet (1,000 Units total) by mouth daily Do not start before 2023. (Patient not taking: Reported on 2024) 30 tablet 0    ergocalciferol (VITAMIN D2) 50,000 units Take 1 capsule (50,000 Units total) by mouth once a week 12 capsule 1    FLUoxetine (PROzac) 40 MG capsule Fluoxetine 40m capsules in the morning (Patient not taking: Reported on 2024) 60 capsule 1    hydrocortisone 2.5 % cream Apply 2.5 Applications topically if needed (Patient not taking: Reported on 2024)      levonorgestrel-ethinyl estradiol (Chapis) 90-20 MCG per tablet Take 1 tablet by mouth daily (Patient not taking: Reported on 2024) 90 tablet 4    lidocaine (LIDODERM) 5 % Apply 1 patch topically over 12 hours daily Remove & Discard patch within 12 hours or as directed by MD Do not start before 2023. (Patient not taking: Reported on 2024) 30 patch 0    methocarbamol (ROBAXIN) 500 mg tablet Take 2 tablets (1,000 mg total) by mouth daily at bedtime as needed for muscle spasms 7 tablet 0    naltrexone (REVIA) 50 mg tablet Naltrexone 50m tablet po daily (Patient not taking: Reported on 2024) 30 tablet 2    naproxen (Naprosyn) 500 mg tablet Take 1 tablet (500 mg total) by mouth 2 (two) times a day with meals 30 tablet 0    traZODone (DESYREL) 50 mg tablet Take 1 tablet (50 mg total) by mouth daily at bedtime (Patient not taking: Reported on 2024) 30 tablet 0     No current facility-administered medications for this visit.        Allergies    Allergen Reactions    Benadryl [Diphenhydramine] Hyperactivity    Mangifera Indica Itching     [place]     Pineapple - Food Allergy Itching       Review of Systems    Video Exam    There were no vitals filed for this visit.    Physical Exam

## 2025-02-05 ENCOUNTER — SOCIAL WORK (OUTPATIENT)
Dept: BEHAVIORAL/MENTAL HEALTH CLINIC | Facility: CLINIC | Age: 20
End: 2025-02-05
Payer: COMMERCIAL

## 2025-02-05 DIAGNOSIS — F41.1 GENERALIZED ANXIETY DISORDER: Primary | ICD-10-CM

## 2025-02-05 DIAGNOSIS — F31.60 BIPOLAR AFFECTIVE DISORDER, CURRENT EPISODE MIXED, CURRENT EPISODE SEVERITY UNSPECIFIED (HCC): ICD-10-CM

## 2025-02-05 PROCEDURE — 90834 PSYTX W PT 45 MINUTES: CPT

## 2025-02-05 NOTE — PSYCH
"Behavioral Health Psychotherapy Progress Note    Psychotherapy Provided: Individual Psychotherapy     1. Generalized anxiety disorder        2. Bipolar affective disorder, current episode mixed, current episode severity unspecified (HCC)            Goals addressed in session: Goal 1     DATA: Client and therapist met in office. Client and therapist met and began with checking in. Client shared doing well and that she had a good week. Client shared about going to a show over the weekend with her family and boyfriend, shared it went well and processed through event with therapist with brother. Therapist and client discussed that it was disorganized and that she became embarrassed when brother caused arguments. However, client said that grandmother was on her side for most and was willing to support client. Therapist and client discussed about her being home without working. 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 Therapist and client discussed how she would plan to set that up, selling online as well as setting up time to work on different designs while also creating a healthy routine for herself being home now till surgery. Client and therapist also processed through her surgery coming up too.   During this session, this clinician used the following therapeutic modalities: Client-centered Therapy    Substance Abuse was not addressed during this session. If the client is diagnosed with a co-occurring substance use disorder, please indicate any changes in the frequency or amount of use:  Stage of change for addressing substance use diagnoses: No substance use/Not applicable    ASSESSMENT:  Luciana Orozco presents with a Euthymic/ normal mood.     her affect is Normal range and intensity, which is congruent, with her mood and the content of the session. The client has made progress on their goals.    Luciana Orozco presents with a none risk of suicide, none risk of " "self-harm, and none risk of harm to others.    For any risk assessment that surpasses a \"low\" rating, a safety plan must be developed.    A safety plan was indicated: no  If yes, describe in detail on file    PLAN: Between sessions, Luciana Orozco will continue to work on her nail design as well as work on creating healthy routine at home. At the next session, the therapist will use Client-centered Therapy to address anxiety.    Behavioral Health Treatment Plan and Discharge Planning: Luciana Orozco is aware of and agrees to continue to work on their treatment plan. They have identified and are working toward their discharge goals. yes    Depression Follow-up Plan Completed: No    Visit start and stop times:    02/06/25  Start Time: 1404  Stop Time: 1452  Total Visit Time: 48 minutes  "

## 2025-02-20 ENCOUNTER — SOCIAL WORK (OUTPATIENT)
Dept: BEHAVIORAL/MENTAL HEALTH CLINIC | Facility: CLINIC | Age: 20
End: 2025-02-20
Payer: COMMERCIAL

## 2025-02-20 DIAGNOSIS — F31.60 BIPOLAR AFFECTIVE DISORDER, CURRENT EPISODE MIXED, CURRENT EPISODE SEVERITY UNSPECIFIED (HCC): ICD-10-CM

## 2025-02-20 DIAGNOSIS — F41.1 GENERALIZED ANXIETY DISORDER: Primary | ICD-10-CM

## 2025-02-20 PROCEDURE — 90834 PSYTX W PT 45 MINUTES: CPT

## 2025-02-20 NOTE — PSYCH
"Behavioral Health Psychotherapy Progress Note    Psychotherapy Provided: Individual Psychotherapy     1. Generalized anxiety disorder        2. Bipolar affective disorder, current episode mixed, current episode severity unspecified (HCC)            Goals addressed in session: Goal 1     DATA: Client and therapist met in office for session. Client shared doing well overall. Client shared that she was still working on building a routine for herself not working currently. 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 Client and therapist discussed how she was working on budgeting currently due to not working, discussed about learning to drive still and wanting to get her license soon. Client and therapist discussed about fears and concerns with driving too. Discussed ideas and coping skills.   During this session, this clinician used the following therapeutic modalities: Client-centered Therapy    Substance Abuse was not addressed during this session. If the client is diagnosed with a co-occurring substance use disorder, please indicate any changes in the frequency or amount of use:  Stage of change for addressing substance use diagnoses: No substance use/Not applicable    ASSESSMENT:  Luciana Orozco presents with a Euthymic/ normal mood.     her affect is Normal range and intensity, which is congruent, with her mood and the content of the session. The client has made progress on their goals.     Luciana Orozco presents with a none risk of suicide, none risk of self-harm, and none risk of harm to others.    For any risk assessment that surpasses a \"low\" rating, a safety plan must be developed.    A safety plan was indicated: no  If yes, describe in detail on file    PLAN: Between sessions, Luciana Orozco will continue to work on creating routine. At the next session, the therapist will use Client-centered Therapy to address anxiety.    Behavioral Health " Treatment Plan and Discharge Planning: Luciana Orozco is aware of and agrees to continue to work on their treatment plan. They have identified and are working toward their discharge goals. yes    Depression Follow-up Plan Completed: No    Visit start and stop times:    02/21/25  Start Time: 1105  Stop Time: 1155  Total Visit Time: 50 minutes

## 2025-03-05 ENCOUNTER — TELEMEDICINE (OUTPATIENT)
Dept: BEHAVIORAL/MENTAL HEALTH CLINIC | Facility: CLINIC | Age: 20
End: 2025-03-05
Payer: COMMERCIAL

## 2025-03-05 DIAGNOSIS — F31.60 BIPOLAR AFFECTIVE DISORDER, CURRENT EPISODE MIXED, CURRENT EPISODE SEVERITY UNSPECIFIED (HCC): ICD-10-CM

## 2025-03-05 DIAGNOSIS — F41.1 GENERALIZED ANXIETY DISORDER: Primary | ICD-10-CM

## 2025-03-05 PROCEDURE — 90837 PSYTX W PT 60 MINUTES: CPT

## 2025-03-05 NOTE — PSYCH
Virtual Regular Visit    Verification of patient location:    Patient is located at Home in the following state in which I hold an active license [place]      Assessment/Plan:    Problem List Items Addressed This Visit          Behavioral Health    Generalized anxiety disorder - Primary    Bipolar affective disorder, current episode mixed (HCC)       Goals addressed in session: Goal 1     Depression Follow-up Plan Completed: No    Reason for visit is No chief complaint on file.       Encounter provider Patricia Aragon      Recent Visits  Date Type Provider Dept   25 Telemedicine Patricia Aragon [facility] Therapist [facility]   Showing recent visits within past 7 days and meeting all other requirements  Future Appointments  No visits were found meeting these conditions.  Showing future appointments within next 150 days and meeting all other requirements       The patient was identified by name and date of birth. Luciana Orozco was informed that this is a telemedicine visit and that the visit is being conducted throughthe Epic Embedded platform. She agrees to proceed..  My office door was closed. No one else was in the room.  She acknowledged consent and understanding of privacy and security of the video platform. The patient has agreed to participate and understands they can discontinue the visit at any time.    Patient is aware this is a billable service.     Subjective  Luciana Orozco is a 19 y.o. adult .      HPI     Past Medical History:   Diagnosis Date    Anxiety     Asthma     Depression     Eczema        Past Surgical History:   Procedure Laterality Date    WISDOM TOOTH EXTRACTION         Current Outpatient Medications   Medication Sig Dispense Refill    ARIPiprazole (ABILIFY) 5 mg tablet Aripiprazole 5m 1/2  tablet po  daily (Patient not taking: Reported on 2024) 45 tablet 1    cholecalciferol (VITAMIN D3) 1,000 units tablet Take 1 tablet (1,000 Units total) by mouth daily Do not start  before 2023. (Patient not taking: Reported on 2024) 30 tablet 0    ergocalciferol (VITAMIN D2) 50,000 units Take 1 capsule (50,000 Units total) by mouth once a week 12 capsule 1    FLUoxetine (PROzac) 40 MG capsule Fluoxetine 40m capsules in the morning (Patient not taking: Reported on 2024) 60 capsule 1    hydrocortisone 2.5 % cream Apply 2.5 Applications topically if needed (Patient not taking: Reported on 2024)      levonorgestrel-ethinyl estradiol (Chapis) 90-20 MCG per tablet Take 1 tablet by mouth daily (Patient not taking: Reported on 2024) 90 tablet 4    lidocaine (LIDODERM) 5 % Apply 1 patch topically over 12 hours daily Remove & Discard patch within 12 hours or as directed by MD Do not start before 2023. (Patient not taking: Reported on 2024) 30 patch 0    methocarbamol (ROBAXIN) 500 mg tablet Take 2 tablets (1,000 mg total) by mouth daily at bedtime as needed for muscle spasms 7 tablet 0    naltrexone (REVIA) 50 mg tablet Naltrexone 50m tablet po daily (Patient not taking: Reported on 2024) 30 tablet 2    naproxen (Naprosyn) 500 mg tablet Take 1 tablet (500 mg total) by mouth 2 (two) times a day with meals 30 tablet 0    traZODone (DESYREL) 50 mg tablet Take 1 tablet (50 mg total) by mouth daily at bedtime (Patient not taking: Reported on 2024) 30 tablet 0     No current facility-administered medications for this visit.        Allergies   Allergen Reactions    Benadryl [Diphenhydramine] Hyperactivity    Mangifera Indica Itching     [place]     Pineapple - Food Allergy Itching       Review of Systems    Video Exam    There were no vitals filed for this visit.    Physical Exam       Behavioral Health Psychotherapy Progress Note    Psychotherapy Provided: Individual Psychotherapy     1. Generalized anxiety disorder        2. Bipolar affective disorder, current episode mixed, current episode severity unspecified (HCC)            Goals addressed in  "session: Goal 1     DATA: client and therapist met virtually for session due to clients schedule. Client shared she was getting herself ready to go to a [employer] event at her mothers house. Client shared how that happened and how she became involved. Client and therapist discussed that she was still working on building a routine that was healthy for her. Client and therapist discussed that she was struggling with wanting to take a nap and not really leave the house but did enjoy sometimes still leaving the house. Client and therapist discussed that she still wants to go to [employer] for nails specifically however finding herself not working on nails recently. 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   During this session, this clinician used the following therapeutic modalities: Client-centered Therapy    Substance Abuse was not addressed during this session. If the client is diagnosed with a co-occurring substance use disorder, please indicate any changes in the frequency or amount of use:  Stage of change for addressing substance use diagnoses: No substance use/Not applicable    ASSESSMENT:  Luciana Orozco presents with a Euthymic/ normal mood.     her affect is Normal range and intensity, which is congruent, with her mood and the content of the session. The client has made progress on their goals.     Luciana Orozco presents with a none risk of suicide, none risk of self-harm, and none risk of harm to others.    For any risk assessment that surpasses a \"low\" rating, a safety plan must be developed.    A safety plan was indicated: no  If yes, describe in detail on file    PLAN: Between sessions, Luciana Orozco will continue to build a routine that is healthy. At the next session, the therapist will use Client-centered Therapy to address anxiety.    Behavioral Health Treatment Plan and Discharge Planning: Luciana Orozco is aware of " and agrees to continue to work on their treatment plan. They have identified and are working toward their discharge goals. yes    Depression Follow-up Plan Completed: No    Visit start and stop times:    03/06/25  Start Time: 0901  Stop Time: 0956  Total Visit Time: 55 minutes

## 2025-03-20 ENCOUNTER — SOCIAL WORK (OUTPATIENT)
Dept: BEHAVIORAL/MENTAL HEALTH CLINIC | Facility: CLINIC | Age: 20
End: 2025-03-20
Payer: COMMERCIAL

## 2025-03-20 DIAGNOSIS — F31.60 BIPOLAR AFFECTIVE DISORDER, CURRENT EPISODE MIXED, CURRENT EPISODE SEVERITY UNSPECIFIED (HCC): ICD-10-CM

## 2025-03-20 DIAGNOSIS — F41.1 GENERALIZED ANXIETY DISORDER: Primary | ICD-10-CM

## 2025-03-20 PROCEDURE — 90837 PSYTX W PT 60 MINUTES: CPT

## 2025-03-20 NOTE — PSYCH
"Behavioral Health Psychotherapy Progress Note    Psychotherapy Provided: Individual Psychotherapy     1. Generalized anxiety disorder        2. Bipolar affective disorder, current episode mixed, current episode severity unspecified (HCC)            Goals addressed in session: Goal 1     DATA: Client and therapist met in office for session. Client shared doing well overall. Client shared about recent routines getting better as well as shared that she has heightened emotions this week- due to her menstrual cycle. Client shared that she was \"lashing out\"- client and therapist discussed about how hormones effect us but what we do with it is important. Client shared she felt bad for how she treated her boyfriend during that and had apologized. Client shared that they don't argue normally so that she does not know how to handle it normally. Client and therapist discussed about clients funny stories with her and her boyfriend, shared that they have a wonderful relationship. Therapist validated clients feelings and discussed that that can be temporary.  During this session, this clinician used the following therapeutic modalities: Client-centered Therapy    Substance Abuse was not addressed during this session. If the client is diagnosed with a co-occurring substance use disorder, please indicate any changes in the frequency or amount of use: . Stage of change for addressing substance use diagnoses: No substance use/Not applicable    ASSESSMENT:  Luciana Orozco presents with a Euthymic/ normal mood.     her affect is Normal range and intensity, which is congruent, with her mood and the content of the session. The client has made progress on their goals.    Luciana Orozco presents with a none risk of suicide, none risk of self-harm, and none risk of harm to others.    For any risk assessment that surpasses a \"low\" rating, a safety plan must be developed.    A safety plan was indicated: no  If yes, describe in detail on " file    PLAN: Between sessions, Luciana Orozco will continue to work on routine and coping skills. At the next session, the therapist will use Client-centered Therapy to address anxiety, emotions.    Behavioral Health Treatment Plan and Discharge Planning: Luciana Orozco is aware of and agrees to continue to work on their treatment plan. They have identified and are working toward their discharge goals. yes    Depression Follow-up Plan Completed: No    Visit start and stop times:    03/20/2025  Start Time: 1101  Stop Time: 1201  Total Visit Time: 60 minutes

## 2025-03-31 ENCOUNTER — OFFICE VISIT (OUTPATIENT)
Dept: OBGYN CLINIC | Facility: HOSPITAL | Age: 20
End: 2025-03-31
Payer: COMMERCIAL

## 2025-03-31 ENCOUNTER — HOSPITAL ENCOUNTER (OUTPATIENT)
Dept: RADIOLOGY | Facility: HOSPITAL | Age: 20
Discharge: HOME | End: 2025-03-31
Attending: ORTHOPAEDIC SURGERY
Payer: COMMERCIAL

## 2025-03-31 VITALS — HEIGHT: 65 IN | WEIGHT: 267.6 LBS | BODY MASS INDEX: 44.58 KG/M2

## 2025-03-31 DIAGNOSIS — M42.00 SCHEURMANN'S DISEASE: ICD-10-CM

## 2025-03-31 DIAGNOSIS — M42.06 SCHEUERMANN'S KYPHOSIS OF LUMBAR SPINE: Primary | ICD-10-CM

## 2025-03-31 DIAGNOSIS — M42.06 SCHEUERMANN'S KYPHOSIS OF LUMBAR SPINE: ICD-10-CM

## 2025-03-31 PROCEDURE — 72082 X-RAY EXAM ENTIRE SPI 2/3 VW: CPT

## 2025-03-31 PROCEDURE — 99214 OFFICE O/P EST MOD 30 MIN: CPT | Performed by: ORTHOPAEDIC SURGERY

## 2025-03-31 NOTE — PROGRESS NOTES
Preop visit today  Overall no changes in clinical scenarior  Surgery was rescheduled to this year  She has persistently symptomatic Scheuermann kyphosis refractory to conservative management  Pain is midthoracic  Today's Xrs with bolster view reviewed  Still with thoracic hyperkyphosis and contiguous multi-level vertebral wedging  MRI demonstrated Schmorl's nodes and redemonstrated above  First lordotic vertebrae is L1  Stable sagittal vertebrae is L2  UEV is T3 on today's radiographs  Anticipate PSFI T3-L2 with apical Leonardo osteotomies for SK    Discussed in detail objective outcomes associated with SK PSFI  Patient repeatedly demonstrates good insight into condition, treatment plan considerations, and elective nature  Discussed indications but that pain may persist and also pain outside of spinal fusion region may be present and/or worsen and not treated by this procedure, patient very consistently elects to proceed after detailed discussions about risks/benefits  Anticipate PICU stay discussed possible patient may be transferred to adult inpatient area postop as well    25-OH-D was low, patient was prescribed supplementation  Now multivitamin    Labs ordered today preop: CBC, CMP, PTT/PT, type & screen, 25-OH-D repeat (if still low will re-Rx supplementation given gap in time)  CT scan T/L spine ordered for preop planning [facility] protocol

## 2025-04-02 ENCOUNTER — SOCIAL WORK (OUTPATIENT)
Dept: BEHAVIORAL/MENTAL HEALTH CLINIC | Facility: CLINIC | Age: 20
End: 2025-04-02
Payer: COMMERCIAL

## 2025-04-02 DIAGNOSIS — F41.1 GENERALIZED ANXIETY DISORDER: Primary | ICD-10-CM

## 2025-04-02 PROCEDURE — 90837 PSYTX W PT 60 MINUTES: CPT

## 2025-04-02 NOTE — PSYCH
Behavioral Health Psychotherapy Progress Note    Psychotherapy Provided: Individual Psychotherapy     1. Generalized anxiety disorder            Goals addressed in session: Goal 1     DATA: Client and therapist met in office for session. Client shared doing well, shared she started working out at home, working on her diet and feeling better already. Client shared she started this more after finding messages her boyfriend was sending to other girls online. Client and therapist processed this. Client shared she wants to stay with him, that they have been working through it and feels that he is really sorry. Client and therapist discussed that he only messaged women and never touched them as well as stated he felt they were distancing. Therapist challenged those thoughts with empathy, questioning why he did not tell her he felt that way till after she found out. Client agreed with that and stated she felt it was better now, that they needed to talk it over. Therapist validated clients feelings. Client and therapist discussed that she felt better now, working towards feeling better physically prior to surgery. Discussed thoughts of upcoming surgery and validated clients worries. Client and therapist scheduled upcoming appointments as well as one for after surgery, virtually.    During this session, this clinician used the following therapeutic modalities: Client-centered Therapy    Substance Abuse was not addressed during this session. If the client is diagnosed with a co-occurring substance use disorder, please indicate any changes in the frequency or amount of use:  Stage of change for addressing substance use diagnoses: No substance use/Not applicable    ASSESSMENT:  Luciana Orozco presents with a Euthymic/ normal mood.     her affect is Normal range and intensity, which is congruent, with her mood and the content of the session. The client has made progress on their goals.    Luciana Orozco presents with a  "none risk of suicide, none risk of self-harm, and none risk of harm to others.    For any risk assessment that surpasses a \"low\" rating, a safety plan must be developed.    A safety plan was indicated: no  If yes, describe in detail on file    PLAN: Between sessions, Luciana Orozco will continue to practice coping skills. At the next session, the therapist will use Client-centered Therapy to address anxiety.    Behavioral Health Treatment Plan and Discharge Planning: Luciana Orozco is aware of and agrees to continue to work on their treatment plan. They have identified and are working toward their discharge goals. yes    Depression Follow-up Plan Completed: No    Visit start and stop times:    04/02/2025  Start Time: 1401  Stop Time: 1457  Total Visit Time: 56 minutes  "

## 2025-04-08 ENCOUNTER — ANESTHESIA EVENT (OUTPATIENT)
Dept: PERIOP | Facility: HOSPITAL | Age: 20
DRG: 303 | End: 2025-04-08
Payer: COMMERCIAL

## 2025-04-08 NOTE — PRE-PROCEDURE INSTRUCTIONS
Medication instructions for day of surgery reviewed. Please take all instructed medications with only a sip of water.       You will receive a call one business day prior to surgery with an arrival time and hospital directions. If your surgery is scheduled on a Monday, the hospital will be calling you on the Friday prior to your surgery. If you have not heard from anyone by 8pm, please call the hospital supervisor through the hospital  at [phone]. ([place] [phone] or [place] [phone]).    Do not eat or drink anything after midnight the night before your surgery, including candy, mints, lifesavers, or chewing gum. Do not drink alcohol 24hrs before your surgery. Try not to smoke at least 24hrs before your surgery.       Follow the pre surgery showering instructions as listed in the “My Surgical Experience Booklet” or otherwise provided by your surgeon's office. Do not use a blade to shave the surgical area 1 week before surgery. It is okay to use a clean electric clippers up to 24 hours before surgery. Do not apply any lotions, creams, including makeup, cologne, deodorant, or perfumes after showering on the day of your surgery. Do not use dry shampoo, hair spray, hair gel, or any type of hair products.     No contact lenses, eye make-up, or artificial eyelashes. Remove nail polish, including gel polish, and any artificial, gel, or acrylic nails if possible. Remove all jewelry including rings and body piercing jewelry.     Wear causal clothing that is easy to take on and off. Consider your type of surgery.    Keep any valuables, jewelry, piercings at home. Please bring any specially ordered equipment (sling, braces) if indicated.    Arrange for a responsible person to drive you to and from the hospital on the day of your surgery. Please confirm the visitor policy for the day of your procedure when you receive your phone call with an arrival time.     Call the surgeon's office with any new  illnesses, exposures, or additional questions prior to surgery.    Please reference your “My Surgical Experience Booklet” for additional information to prepare for your upcoming surgery.No outpatient medications have been marked as taking for the 4/29/25 encounter (Hospital Encounter).

## 2025-04-11 ENCOUNTER — TELEMEDICINE (OUTPATIENT)
Dept: BEHAVIORAL/MENTAL HEALTH CLINIC | Facility: CLINIC | Age: 20
End: 2025-04-11
Payer: COMMERCIAL

## 2025-04-11 ENCOUNTER — HOSPITAL ENCOUNTER (OUTPATIENT)
Dept: CT IMAGING | Facility: HOSPITAL | Age: 20
Discharge: HOME | End: 2025-04-11
Attending: ORTHOPAEDIC SURGERY
Payer: COMMERCIAL

## 2025-04-11 DIAGNOSIS — M42.06 SCHEUERMANN'S KYPHOSIS OF LUMBAR SPINE: ICD-10-CM

## 2025-04-11 DIAGNOSIS — F31.60 BIPOLAR AFFECTIVE DISORDER, CURRENT EPISODE MIXED, CURRENT EPISODE SEVERITY UNSPECIFIED (HCC): ICD-10-CM

## 2025-04-11 DIAGNOSIS — F41.1 GENERALIZED ANXIETY DISORDER: Primary | ICD-10-CM

## 2025-04-11 PROCEDURE — 72131 CT LUMBAR SPINE W/O DYE: CPT

## 2025-04-11 PROCEDURE — 72128 CT CHEST SPINE W/O DYE: CPT

## 2025-04-11 PROCEDURE — 90834 PSYTX W PT 45 MINUTES: CPT

## 2025-04-11 NOTE — PSYCH
"Behavioral Health Psychotherapy Progress Note    Psychotherapy Provided: Individual Psychotherapy     1. Generalized anxiety disorder        2. Bipolar affective disorder, current episode mixed, current episode severity unspecified (HCC)            Goals addressed in session: Goal 1     DATA: Client and therapist met virtually for session. Client shared doing well, shared that she has had a good week. Client shared completing pre-operation blood work, testing, etc and heading to a CT scan this afternoon. Client shared that overall things were going well, feeling more anxious about surgery. Therapist validated anxiety. Therapist and client discussed that her relationship was much better, shared that she struggles with him being on his phone being unsure of what he could be doing however shared that she felt better than previously. Therapist and client discussed that her aunt and uncle were in town this week, shared they are \"a lot\" to have around, trying to balance being with her boyfriend and relaxing as well as spending time with family.   During this session, this clinician used the following therapeutic modalities: Client-centered Therapy    Substance Abuse was not addressed during this session. If the client is diagnosed with a co-occurring substance use disorder, please indicate any changes in the frequency or amount of use:  Stage of change for addressing substance use diagnoses: No substance use/Not applicable    ASSESSMENT:  Luciana Orozco presents with a Euthymic/ normal mood.     her affect is Normal range and intensity, which is congruent, with her mood and the content of the session. The client has made progress on their goals.    Luciana Orozco presents with a none risk of suicide, none risk of self-harm, and none risk of harm to others.    For any risk assessment that surpasses a \"low\" rating, a safety plan must be developed.    A safety plan was indicated: no  If yes, describe in detail on " file    PLAN: Between sessions, Luciana Orozco will continue to identify triggers. At the next session, the therapist will use Client-centered Therapy to address emotions, anxiety.    Behavioral Health Treatment Plan and Discharge Planning: Luciana Orozco is aware of and agrees to continue to work on their treatment plan. They have identified and are working toward their discharge goals. yes    Depression Follow-up Plan Completed: No    Visit start and stop times:    2025  Start Time: 1100  Stop Time: 1150  Total Visit Time: 50 minutesVirtual Regular VisitName: Luciana Orozco      : 2005      MRN: [id number]  Encounter Provider: Patricia Aragon  Encounter Date: 2025   Encounter department: [facility] THERAPIST MENTAL HEALTH OUTPATIENT  :  Assessment & Plan  Generalized anxiety disorder         Bipolar affective disorder, current episode mixed, current episode severity unspecified (HCC)            Reason for visit is No chief complaint on file.     Recent Visits  Date Type Provider Dept   25 Telemedicine Patricia Aragon [facility] Therapist [facility]   Showing recent visits within past 7 days and meeting all other requirements  Future Appointments  No visits were found meeting these conditions.  Showing future appointments within next 150 days and meeting all other requirements     History of Present Illness     HPI    Past Medical History   Past Medical History:   Diagnosis Date    Anxiety     Asthma     Depression     Eczema      Past Surgical History:   Procedure Laterality Date    WISDOM TOOTH EXTRACTION       No current outpatient medications  Allergies   Allergen Reactions    Benadryl [Diphenhydramine] Hyperactivity    Mangifera Indica Itching     Edgar     Pineapple - Food Allergy Itching       Objective   There were no vitals taken for this visit.    Video Exam  Physical Exam     Administrative Statements   Encounter provider Patricia Aragon    The Patient  is located at Home and in the following state in which I hold an active license [place].    The patient was identified by name and date of birth. Luciana Orozco was informed that this is a telemedicine visit and that the visit is being conducted through the Epic Embedded platform. She agrees to proceed..  My office door was closed. No one else was in the room.  She acknowledged consent and understanding of privacy and security of the video platform. The patient has agreed to participate and understands they can discontinue the visit at any time.

## 2025-04-16 ENCOUNTER — TELEPHONE (OUTPATIENT)
Dept: PSYCHIATRY | Facility: CLINIC | Age: 20
End: 2025-04-16

## 2025-04-16 ENCOUNTER — TELEPHONE (OUTPATIENT)
Dept: OBGYN CLINIC | Facility: HOSPITAL | Age: 20
End: 2025-04-16

## 2025-04-16 ENCOUNTER — TELEPHONE (OUTPATIENT)
Age: 20
End: 2025-04-16

## 2025-04-16 NOTE — TELEPHONE ENCOUNTER
Attempted to contact Shannan to relay message that she will need to contact [facility] and have them sent or send through [employer]. There was no answer and voicemail full. Will try again later

## 2025-04-16 NOTE — TELEPHONE ENCOUNTER
Caller: Grandmother/Shannan    Doctor: Dr. Padilla    Reason for call: Please fax clearance form to Dominique Sandoval @[phone]. The patient had a clearance appt w/the today.    Call back#: [phone]

## 2025-04-16 NOTE — TELEPHONE ENCOUNTER
Left voicemail informing patient and/or parent/guardian of the Psych Encounter form needing to be signed as a requirement from the insurance company for billing purposes. Patient can access form via [employer] and sign electronically.     Please make patient aware this form must be signed for each visit as a requirement to continue future visits with provider.    Virtual Encounter form 4/11/25 call #1

## 2025-04-16 NOTE — TELEPHONE ENCOUNTER
Caller: Dominique De La Garza    Doctor: Dr. Padilla     Reason for call: Patient is there for surgery clearance and they wanted to know if there is a form they need to fill out for the clearance.  Please call back to advise     Call back#: [phone]

## 2025-04-16 NOTE — TELEPHONE ENCOUNTER
Called and spoke with Shannan. Relayed ARABELLA Velázquez's message, she said labs were done at Friday 1 weeks  ago. I do not see them in the system though. Is there a way to get them or should I call and request? Thanks!

## 2025-04-16 NOTE — TELEPHONE ENCOUNTER
Caller: Shannan -Grandparent     Doctor: Dr. Padilla    Reason for call: calling to verify all forms for sx are completed if not which are missing ? Please contact Shannan to discuss     Call back#: [phone]

## 2025-04-17 ENCOUNTER — APPOINTMENT (OUTPATIENT)
Dept: LAB | Facility: HOSPITAL | Age: 20
End: 2025-04-17
Payer: COMMERCIAL

## 2025-04-17 ENCOUNTER — LAB REQUISITION (OUTPATIENT)
Dept: LAB | Facility: HOSPITAL | Age: 20
End: 2025-04-17
Payer: COMMERCIAL

## 2025-04-17 DIAGNOSIS — M42.06 SCHEUERMANN'S KYPHOSIS OF LUMBAR SPINE: ICD-10-CM

## 2025-04-17 DIAGNOSIS — M42.06: ICD-10-CM

## 2025-04-17 DIAGNOSIS — E55.9 VITAMIN D INSUFFICIENCY: ICD-10-CM

## 2025-04-17 LAB
25(OH)D3 SERPL-MCNC: 20.4 NG/ML (ref 30–100)
ABO GROUP BLD: NORMAL
ALBUMIN SERPL BCG-MCNC: 4.7 G/DL (ref 3.5–5)
ALP SERPL-CCNC: 71 U/L (ref 34–104)
ALT SERPL W P-5'-P-CCNC: 17 U/L (ref 7–52)
ANION GAP SERPL CALCULATED.3IONS-SCNC: 10 MMOL/L (ref 4–13)
APTT PPP: 28 SECONDS (ref 23–34)
AST SERPL W P-5'-P-CCNC: 17 U/L (ref 5–45)
BILIRUB SERPL-MCNC: 0.44 MG/DL (ref 0.2–1)
BLD GP AB SCN SERPL QL: NEGATIVE
BUN SERPL-MCNC: 14 MG/DL (ref 5–25)
CALCIUM SERPL-MCNC: 9.8 MG/DL (ref 8.4–10.2)
CHLORIDE SERPL-SCNC: 101 MMOL/L (ref 96–108)
CO2 SERPL-SCNC: 27 MMOL/L (ref 21–32)
CREAT SERPL-MCNC: 0.7 MG/DL (ref 0.6–1.3)
ERYTHROCYTE [DISTWIDTH] IN BLOOD BY AUTOMATED COUNT: 12.3 % (ref 11.6–15.1)
GLUCOSE P FAST SERPL-MCNC: 95 MG/DL (ref 65–99)
HCT VFR BLD AUTO: 43.2 % (ref 36.5–46.1)
HGB BLD-MCNC: 14.2 G/DL (ref 12–15.4)
INR PPP: 0.93 (ref 0.85–1.19)
MCH RBC QN AUTO: 29.8 PG (ref 26.8–34.3)
MCHC RBC AUTO-ENTMCNC: 32.9 G/DL (ref 31.4–37.4)
MCV RBC AUTO: 91 FL (ref 82–98)
PLATELET # BLD AUTO: 364 THOUSANDS/UL (ref 149–390)
PMV BLD AUTO: 9.9 FL (ref 8.9–12.7)
POTASSIUM SERPL-SCNC: 4.5 MMOL/L (ref 3.5–5.3)
PROT SERPL-MCNC: 7.6 G/DL (ref 6.4–8.4)
PROTHROMBIN TIME: 12.8 SECONDS (ref 12.3–15)
RBC # BLD AUTO: 4.77 MILLION/UL (ref 3.88–5.12)
RH BLD: POSITIVE
SODIUM SERPL-SCNC: 138 MMOL/L (ref 135–147)
SPECIMEN EXPIRATION DATE: NORMAL
WBC # BLD AUTO: 8.16 THOUSAND/UL (ref 4.31–10.16)

## 2025-04-17 PROCEDURE — 80053 COMPREHEN METABOLIC PANEL: CPT

## 2025-04-17 PROCEDURE — 82306 VITAMIN D 25 HYDROXY: CPT

## 2025-04-17 PROCEDURE — 36415 COLL VENOUS BLD VENIPUNCTURE: CPT

## 2025-04-17 PROCEDURE — 86901 BLOOD TYPING SEROLOGIC RH(D): CPT | Performed by: ORTHOPAEDIC SURGERY

## 2025-04-17 PROCEDURE — 86850 RBC ANTIBODY SCREEN: CPT | Performed by: ORTHOPAEDIC SURGERY

## 2025-04-17 PROCEDURE — 85610 PROTHROMBIN TIME: CPT

## 2025-04-17 PROCEDURE — 85730 THROMBOPLASTIN TIME PARTIAL: CPT

## 2025-04-17 PROCEDURE — 85027 COMPLETE CBC AUTOMATED: CPT

## 2025-04-17 PROCEDURE — 86900 BLOOD TYPING SEROLOGIC ABO: CPT | Performed by: ORTHOPAEDIC SURGERY

## 2025-04-24 ENCOUNTER — SOCIAL WORK (OUTPATIENT)
Dept: BEHAVIORAL/MENTAL HEALTH CLINIC | Facility: CLINIC | Age: 20
End: 2025-04-24
Payer: COMMERCIAL

## 2025-04-24 DIAGNOSIS — F41.1 GENERALIZED ANXIETY DISORDER: Primary | ICD-10-CM

## 2025-04-24 PROCEDURE — 90837 PSYTX W PT 60 MINUTES: CPT

## 2025-04-24 NOTE — PSYCH
Behavioral Health Psychotherapy Progress Note    Psychotherapy Provided: Individual Psychotherapy     1. Generalized anxiety disorder            Goals addressed in session: Goal 1     DATA: Client and therapist met in office for session. Client shared about her pre-operation blood work and exams that were needing. Client shared about getting wrong blood work done but happy it was caught early enough so that way her surgery does not get pushed back again. Client shared she was still feeling nervous but now wanting to get it over with. Therapist validated all of clients feelings. Client shared about Easter, enjoying her time with family as well as her gifts. Client also shared that things had been going well overall. Client shared that her relationship was going well too, that she was happy but still concerned of his behaviors online with girls however client shared that they do check each others phones so she believes him. Therapist validated those feelings and reminded her to take care of herself too most importantly with such a big surgery happening next week. Client and therapist then discussed that therapist has accepted another position and will be leaving the first week of June. Therapist and client discussed about change not being easy, that she can be put on the transfer list too and hopefully will allow her to be seen quicker. Client shared taking this well and wa happy to learn before surgery. Therapist wished good luck.   During this session, this clinician used the following therapeutic modalities: Client-centered Therapy    Substance Abuse was not addressed during this session. If the client is diagnosed with a co-occurring substance use disorder, please indicate any changes in the frequency or amount of use: . Stage of change for addressing substance use diagnoses: No substance use/Not applicable    ASSESSMENT:  Luciana Orozco presents with a Euthymic/ normal mood.     her affect is Normal range and  "intensity, which is congruent, with her mood and the content of the session. The client has made progress on their goals.     Luciana Orozco presents with a none risk of suicide, none risk of self-harm, and none risk of harm to others.    For any risk assessment that surpasses a \"low\" rating, a safety plan must be developed.    A safety plan was indicated: no  If yes, describe in detail on file    PLAN: Between sessions, Luciana Orozco will take care of self after surgery. At the next session, the therapist will use Client-centered Therapy to address emotions, anxiety.    Behavioral Health Treatment Plan and Discharge Planning: Luciana Orozco is aware of and agrees to continue to work on their treatment plan. They have identified and are working toward their discharge goals. yes    Depression Follow-up Plan Completed: No    Visit start and stop times:    04/24/2025  Start Time: 1102  Stop Time: 1200  Total Visit Time: 58 minutes  "

## 2025-04-24 NOTE — BH TREATMENT PLAN
Outpatient Behavioral Health Psychotherapy Treatment Plan    Luciana Orozco  2005     Date of Initial Psychotherapy Assessment: 11/21/2024   Date of Current Treatment Plan: 04/24/25  Treatment Plan Target Date: TBD  Treatment Plan Expiration Date: 10/2/2025    Diagnosis:   1. Generalized anxiety disorder            Area(s) of Need: Emotional regulation     Long Term Goal 1 (in the client's own words): I want to work on my anger and regulating/identifying my emotions    Stage of Change: Preparation    Target Date for completion: 10/24/2025     Anticipated therapeutic modalities: CBT, talk-therapy     People identified to complete this goal: Myself, therapist       Objective 1: (identify the means of measuring success in meeting the objective): Identifying my emotions to better understand how I am feeling/why I am feeling that way.      Objective 2: (identify the means of measuring success in meeting the objective): I will work on identifying my feelings and share with a safe person (e.g Catarina, Wesley, friends) as well as in session and practice using my coping skills as needed.      I am currently under the care of a [facility] psychiatric provider: no    My [facility] psychiatric provider is: n/a    I am currently taking psychiatric medications: No    I feel that I will be ready for discharge from mental health care when I reach the following (measurable goal/objective): When I can identifying and understand my feelings in the moment and work on my reactions.     For children and adults who have a legal guardian:   Has there been any change to custody orders and/or guardianship status? No. If yes, attach updated documentation.    I have reviewed my Crisis Plan and have been offered a copy of this plan    Behavioral Health Treatment Plan [facility]: Diagnosis and Treatment Plan explained to Luciana Orozco acknowledges an understanding of their diagnosis. Luciana Orozco agrees to this  treatment plan.    I have been offered a copy of this Treatment Plan. yes

## 2025-04-28 NOTE — ANESTHESIA PREPROCEDURE EVALUATION
Procedure:  robotic assisted posterior spinal fusion with instrumentation, autograft/allograft, thoracic apical Leonardo osteotomies (at indicated levels) (Spine Lumbar)    Relevant Problems   ANESTHESIA (within normal limits)      CARDIO   (+) Hypercholesterolemia      ENDO (within normal limits)      GI/HEPATIC (within normal limits)      /RENAL (within normal limits)      HEMATOLOGY (within normal limits)      NEURO/PSYCH   (+) Generalized anxiety disorder      PULMONARY   (+) Asthma      Behavioral Health   (+) Bipolar affective disorder, current episode mixed (HCC)   (+) Marijuana smoker   (+) Smoker      Neurology/Sleep   (+) Attention deficit disorder predominant inattentive type      Other   (+) Obesity, Class III, BMI 40-49.9 (morbid obesity)      Lab Results   Component Value Date    WBC 8.16 04/17/2025    HGB 14.2 04/17/2025    HCT 43.2 04/17/2025    MCV 91 04/17/2025     04/17/2025     Lab Results   Component Value Date    SODIUM 138 04/17/2025    K 4.5 04/17/2025     04/17/2025    CO2 27 04/17/2025    BUN 14 04/17/2025    CREATININE 0.70 04/17/2025    GLUC 97 09/19/2024    CALCIUM 9.8 04/17/2025     Lab Results   Component Value Date    INR 0.93 04/17/2025    PROTIME 12.8 04/17/2025     Lab Results   Component Value Date    HGBA1C 5.2 04/12/2023          Physical Exam    Airway    Mallampati score: I  TM Distance: >3 FB  Neck ROM: full     Dental   No notable dental hx     Cardiovascular  Cardiovascular exam normal    Pulmonary  Pulmonary exam normal     Other Findings  post-pubertal.      Anesthesia Plan  ASA Score- 2     Anesthesia Type- general with ASA Monitors.         Additional Monitors: arterial line.    Airway Plan: ETT.    Comment: Discussed with patient plan for anesthesia, as well as risks/benefits, including likely chance of PONV and sore throat, as well as rare possibilities of dental/oropharyngeal/ocular injuries, aspiration, and severe/life-threatening surgical and anesthetic  emergencies.  Patient expressed understanding and agreement.  .       Plan Factors-Exercise tolerance (METS): >4 METS.    Chart reviewed. EKG reviewed. Imaging results reviewed. Existing labs reviewed. Patient summary reviewed.                  Induction- intravenous.    Postoperative Plan- Plan for postoperative opioid use. Planned trial extubation        Informed Consent- Anesthetic plan and risks discussed with patient.  I personally reviewed this patient with the CRNA. Discussed and agreed on the Anesthesia Plan with the CRNA..      NPO Status:  No vitals data found for the desired time range.

## 2025-04-29 ENCOUNTER — HOSPITAL ENCOUNTER (INPATIENT)
Facility: HOSPITAL | Age: 20
LOS: 2 days | Discharge: HOME | DRG: 303 | End: 2025-05-02
Attending: ORTHOPAEDIC SURGERY | Admitting: ORTHOPAEDIC SURGERY
Payer: COMMERCIAL

## 2025-04-29 ENCOUNTER — APPOINTMENT (OUTPATIENT)
Dept: RADIOLOGY | Facility: HOSPITAL | Age: 20
DRG: 303 | End: 2025-04-29
Payer: COMMERCIAL

## 2025-04-29 ENCOUNTER — ANESTHESIA (OUTPATIENT)
Dept: PERIOP | Facility: HOSPITAL | Age: 20
DRG: 303 | End: 2025-04-29
Payer: COMMERCIAL

## 2025-04-29 DIAGNOSIS — Z98.1 H/O SPINAL FUSION: Primary | ICD-10-CM

## 2025-04-29 PROBLEM — Z98.890 S/P SPINAL SURGERY: Status: ACTIVE | Noted: 2025-04-29

## 2025-04-29 PROBLEM — F12.90 MARIJUANA SMOKER: Status: ACTIVE | Noted: 2025-04-29

## 2025-04-29 PROBLEM — F17.200 SMOKER: Status: ACTIVE | Noted: 2025-04-29

## 2025-04-29 LAB
ABO GROUP BLD: NORMAL
ATRIAL RATE: 82 BPM
ATRIAL RATE: 84 BPM
BASE EXCESS BLDA CALC-SCNC: -4 MMOL/L (ref -2–3)
BASE EXCESS BLDA CALC-SCNC: -7 MMOL/L (ref -2–3)
BASE EXCESS BLDA CALC-SCNC: -8 MMOL/L (ref -2–3)
BASOPHILS # BLD AUTO: 0.04 THOUSANDS/ÂΜL (ref 0–0.1)
BASOPHILS NFR BLD AUTO: 0 % (ref 0–1)
CA-I BLD-SCNC: 1.09 MMOL/L (ref 1.12–1.32)
CA-I BLD-SCNC: 1.1 MMOL/L (ref 1.12–1.32)
CA-I BLD-SCNC: 1.17 MMOL/L (ref 1.12–1.32)
EOSINOPHIL # BLD AUTO: 0 THOUSAND/ÂΜL (ref 0–0.61)
EOSINOPHIL NFR BLD AUTO: 0 % (ref 0–6)
ERYTHROCYTE [DISTWIDTH] IN BLOOD BY AUTOMATED COUNT: 12.4 % (ref 11.6–15.1)
EXT PREGNANCY TEST URINE: NEGATIVE
EXT. CONTROL: NORMAL
GLUCOSE SERPL-MCNC: 100 MG/DL (ref 65–140)
GLUCOSE SERPL-MCNC: 110 MG/DL (ref 65–140)
GLUCOSE SERPL-MCNC: 113 MG/DL (ref 65–140)
HCO3 BLDA-SCNC: 18 MMOL/L (ref 22–28)
HCO3 BLDA-SCNC: 19.5 MMOL/L (ref 22–28)
HCO3 BLDA-SCNC: 22.3 MMOL/L (ref 22–28)
HCT VFR BLD AUTO: 35.1 % (ref 36.5–46.1)
HCT VFR BLD CALC: 33 % (ref 36.5–46.1)
HCT VFR BLD CALC: 34 % (ref 36.5–46.1)
HCT VFR BLD CALC: 37 % (ref 36.5–46.1)
HGB BLD-MCNC: 11.1 G/DL (ref 12–15.4)
HGB BLDA-MCNC: 11.2 G/DL (ref 12–15.4)
HGB BLDA-MCNC: 11.6 G/DL (ref 12–15.4)
HGB BLDA-MCNC: 12.6 G/DL (ref 12–15.4)
IMM GRANULOCYTES # BLD AUTO: 0.3 THOUSAND/UL (ref 0–0.2)
IMM GRANULOCYTES NFR BLD AUTO: 1 % (ref 0–2)
LYMPHOCYTES # BLD AUTO: 0.72 THOUSANDS/ÂΜL (ref 0.6–4.47)
LYMPHOCYTES NFR BLD AUTO: 3 % (ref 14–44)
MCH RBC QN AUTO: 29.5 PG (ref 26.8–34.3)
MCHC RBC AUTO-ENTMCNC: 31.6 G/DL (ref 31.4–37.4)
MCV RBC AUTO: 93 FL (ref 82–98)
MONOCYTES # BLD AUTO: 1.17 THOUSAND/ÂΜL (ref 0.17–1.22)
MONOCYTES NFR BLD AUTO: 5 % (ref 4–12)
NEUTROPHILS # BLD AUTO: 20.29 THOUSANDS/ÂΜL (ref 1.85–7.62)
NEUTS SEG NFR BLD AUTO: 91 % (ref 43–75)
NRBC BLD AUTO-RTO: 0 /100 WBCS
P AXIS: 3 DEGREES
P AXIS: 4 DEGREES
PCO2 BLD: 19 MMOL/L (ref 21–32)
PCO2 BLD: 21 MMOL/L (ref 21–32)
PCO2 BLD: 24 MMOL/L (ref 21–32)
PCO2 BLD: 39.8 MM HG (ref 36–44)
PCO2 BLD: 42.3 MM HG (ref 36–44)
PCO2 BLD: 42.8 MM HG (ref 36–44)
PH BLD: 7.26 [PH] (ref 7.35–7.45)
PH BLD: 7.27 [PH] (ref 7.35–7.45)
PH BLD: 7.33 [PH] (ref 7.35–7.45)
PLATELET # BLD AUTO: 316 THOUSANDS/UL (ref 149–390)
PMV BLD AUTO: 9.1 FL (ref 8.9–12.7)
PO2 BLD: 156 MM HG (ref 75–129)
PO2 BLD: 174 MM HG (ref 75–129)
PO2 BLD: 220 MM HG (ref 75–129)
POTASSIUM BLD-SCNC: 3.8 MMOL/L (ref 3.5–5.3)
POTASSIUM BLD-SCNC: 4 MMOL/L (ref 3.5–5.3)
POTASSIUM BLD-SCNC: 4.1 MMOL/L (ref 3.5–5.3)
PR INTERVAL: 140 MS
PR INTERVAL: 146 MS
QRS AXIS: 22 DEGREES
QRS AXIS: 28 DEGREES
QRSD INTERVAL: 84 MS
QRSD INTERVAL: 88 MS
QT INTERVAL: 366 MS
QT INTERVAL: 368 MS
QTC INTERVAL: 428 MS
QTC INTERVAL: 435 MS
RBC # BLD AUTO: 3.76 MILLION/UL (ref 3.88–5.12)
RH BLD: POSITIVE
SAO2 % BLD FROM PO2: 100 % (ref 60–85)
SAO2 % BLD FROM PO2: 99 % (ref 60–85)
SAO2 % BLD FROM PO2: 99 % (ref 60–85)
SODIUM BLD-SCNC: 138 MMOL/L (ref 136–145)
SODIUM BLD-SCNC: 141 MMOL/L (ref 136–145)
SODIUM BLD-SCNC: 142 MMOL/L (ref 136–145)
SPECIMEN SOURCE: ABNORMAL
T WAVE AXIS: 10 DEGREES
T WAVE AXIS: 2 DEGREES
VENTRICULAR RATE: 82 BPM
VENTRICULAR RATE: 84 BPM
WBC # BLD AUTO: 22.52 THOUSAND/UL (ref 4.31–10.16)

## 2025-04-29 PROCEDURE — 82803 BLOOD GASES ANY COMBINATION: CPT

## 2025-04-29 PROCEDURE — 22802 ARTHRD PST DFRM 7-12 VRT SGM: CPT | Performed by: ORTHOPAEDIC SURGERY

## 2025-04-29 PROCEDURE — 22843 INSERT SPINE FIXATION DEVICE: CPT | Performed by: ORTHOPAEDIC SURGERY

## 2025-04-29 PROCEDURE — C1713 ANCHOR/SCREW BN/BN,TIS/BN: HCPCS | Performed by: ORTHOPAEDIC SURGERY

## 2025-04-29 PROCEDURE — 20936 SP BONE AGRFT LOCAL ADD-ON: CPT | Performed by: ORTHOPAEDIC SURGERY

## 2025-04-29 PROCEDURE — NC001 PR NO CHARGE: Performed by: ORTHOPAEDIC SURGERY

## 2025-04-29 PROCEDURE — 22216 INCIS ADDL SPINE SEGMENT: CPT | Performed by: ORTHOPAEDIC SURGERY

## 2025-04-29 PROCEDURE — 93005 ELECTROCARDIOGRAM TRACING: CPT

## 2025-04-29 PROCEDURE — 86923 COMPATIBILITY TEST ELECTRIC: CPT

## 2025-04-29 PROCEDURE — 0RG8071 FUSION OF 8 OR MORE THORACIC VERTEBRAL JOINTS WITH AUTOLOGOUS TISSUE SUBSTITUTE, POSTERIOR APPROACH, POSTERIOR COLUMN, OPEN APPROACH: ICD-10-PCS | Performed by: ORTHOPAEDIC SURGERY

## 2025-04-29 PROCEDURE — 93010 ELECTROCARDIOGRAM REPORT: CPT | Performed by: INTERNAL MEDICINE

## 2025-04-29 PROCEDURE — 81025 URINE PREGNANCY TEST: CPT | Performed by: ORTHOPAEDIC SURGERY

## 2025-04-29 PROCEDURE — 85025 COMPLETE CBC W/AUTO DIFF WBC: CPT

## 2025-04-29 PROCEDURE — 84132 ASSAY OF SERUM POTASSIUM: CPT

## 2025-04-29 PROCEDURE — 72080 X-RAY EXAM THORACOLMB 2/> VW: CPT

## 2025-04-29 PROCEDURE — 4A11X4G MONITORING OF PERIPHERAL NERVOUS ELECTRICAL ACTIVITY, INTRAOPERATIVE, EXTERNAL APPROACH: ICD-10-PCS | Performed by: ORTHOPAEDIC SURGERY

## 2025-04-29 PROCEDURE — 61783 SCAN PROC SPINAL: CPT | Performed by: ORTHOPAEDIC SURGERY

## 2025-04-29 PROCEDURE — 82947 ASSAY GLUCOSE BLOOD QUANT: CPT

## 2025-04-29 PROCEDURE — C1781 MESH (IMPLANTABLE): HCPCS | Performed by: ORTHOPAEDIC SURGERY

## 2025-04-29 PROCEDURE — 85014 HEMATOCRIT: CPT

## 2025-04-29 PROCEDURE — 99223 1ST HOSP IP/OBS HIGH 75: CPT | Performed by: STUDENT IN AN ORGANIZED HEALTH CARE EDUCATION/TRAINING PROGRAM

## 2025-04-29 PROCEDURE — 84295 ASSAY OF SERUM SODIUM: CPT

## 2025-04-29 PROCEDURE — 20930 SP BONE ALGRFT MORSEL ADD-ON: CPT | Performed by: ORTHOPAEDIC SURGERY

## 2025-04-29 PROCEDURE — 22212 INCIS 1 VERTEBRAL SEG THORAC: CPT | Performed by: ORTHOPAEDIC SURGERY

## 2025-04-29 PROCEDURE — 82330 ASSAY OF CALCIUM: CPT

## 2025-04-29 DEVICE — SCREW 55840006045 5.5/6 MAS 6.0X45 CC
Type: IMPLANTABLE DEVICE | Site: SPINE THORACIC | Status: FUNCTIONAL
Brand: CD HORIZON® SPINAL SYSTEM

## 2025-04-29 DEVICE — DBM T45010 10CC PASTE GRAFTON PLUS
Type: IMPLANTABLE DEVICE | Site: SPINE THORACIC | Status: FUNCTIONAL
Brand: GRAFTON®AND GRAFTON PLUS®DEMINERALIZED BONE MATRIX (DBM)

## 2025-04-29 RX ORDER — DIAZEPAM 10 MG/2ML
5 INJECTION, SOLUTION INTRAMUSCULAR; INTRAVENOUS EVERY 6 HOURS PRN
Status: DISCONTINUED | OUTPATIENT
Start: 2025-04-29 | End: 2025-04-29

## 2025-04-29 RX ORDER — CEFAZOLIN SODIUM 1 G/3ML
INJECTION, POWDER, FOR SOLUTION INTRAMUSCULAR; INTRAVENOUS AS NEEDED
Status: DISCONTINUED | OUTPATIENT
Start: 2025-04-29 | End: 2025-04-29

## 2025-04-29 RX ORDER — ACETAMINOPHEN 10 MG/ML
1000 INJECTION, SOLUTION INTRAVENOUS EVERY 6 HOURS PRN
Status: DISCONTINUED | OUTPATIENT
Start: 2025-04-29 | End: 2025-04-29

## 2025-04-29 RX ORDER — CHLORHEXIDINE GLUCONATE ORAL RINSE 1.2 MG/ML
15 SOLUTION DENTAL ONCE
Status: COMPLETED | OUTPATIENT
Start: 2025-04-29 | End: 2025-04-29

## 2025-04-29 RX ORDER — KETAMINE HCL IN NACL, ISO-OSM 100MG/10ML
SYRINGE (ML) INJECTION AS NEEDED
Status: DISCONTINUED | OUTPATIENT
Start: 2025-04-29 | End: 2025-04-29

## 2025-04-29 RX ORDER — SODIUM CHLORIDE, SODIUM LACTATE, POTASSIUM CHLORIDE, CALCIUM CHLORIDE 600; 310; 30; 20 MG/100ML; MG/100ML; MG/100ML; MG/100ML
20 INJECTION, SOLUTION INTRAVENOUS CONTINUOUS
Status: DISCONTINUED | OUTPATIENT
Start: 2025-04-29 | End: 2025-04-29

## 2025-04-29 RX ORDER — PHENYLEPHRINE HCL IN 0.9% NACL 1 MG/10 ML
SYRINGE (ML) INTRAVENOUS AS NEEDED
Status: DISCONTINUED | OUTPATIENT
Start: 2025-04-29 | End: 2025-04-29

## 2025-04-29 RX ORDER — NALOXONE HYDROCHLORIDE 0.4 MG/ML
0.1 INJECTION, SOLUTION INTRAMUSCULAR; INTRAVENOUS; SUBCUTANEOUS
Status: DISCONTINUED | OUTPATIENT
Start: 2025-04-29 | End: 2025-04-30

## 2025-04-29 RX ORDER — DEXAMETHASONE SODIUM PHOSPHATE 10 MG/ML
INJECTION, SOLUTION INTRAMUSCULAR; INTRAVENOUS AS NEEDED
Status: DISCONTINUED | OUTPATIENT
Start: 2025-04-29 | End: 2025-04-29

## 2025-04-29 RX ORDER — PROPOFOL 10 MG/ML
INJECTION, EMULSION INTRAVENOUS AS NEEDED
Status: DISCONTINUED | OUTPATIENT
Start: 2025-04-29 | End: 2025-04-29

## 2025-04-29 RX ORDER — SODIUM CHLORIDE, SODIUM LACTATE, POTASSIUM CHLORIDE, CALCIUM CHLORIDE 600; 310; 30; 20 MG/100ML; MG/100ML; MG/100ML; MG/100ML
100 INJECTION, SOLUTION INTRAVENOUS CONTINUOUS
Status: DISCONTINUED | OUTPATIENT
Start: 2025-04-29 | End: 2025-04-30

## 2025-04-29 RX ORDER — MAGNESIUM HYDROXIDE 1200 MG/15ML
LIQUID ORAL AS NEEDED
Status: DISCONTINUED | OUTPATIENT
Start: 2025-04-29 | End: 2025-04-29 | Stop reason: HOSPADM

## 2025-04-29 RX ORDER — SUCCINYLCHOLINE/SOD CL,ISO/PF 100 MG/5ML
SYRINGE (ML) INTRAVENOUS AS NEEDED
Status: DISCONTINUED | OUTPATIENT
Start: 2025-04-29 | End: 2025-04-29

## 2025-04-29 RX ORDER — CEFAZOLIN SODIUM 2 G/50ML
2000 SOLUTION INTRAVENOUS EVERY 8 HOURS
Status: DISCONTINUED | OUTPATIENT
Start: 2025-04-30 | End: 2025-05-02 | Stop reason: HOSPADM

## 2025-04-29 RX ORDER — ONDANSETRON 2 MG/ML
4 INJECTION INTRAMUSCULAR; INTRAVENOUS EVERY 8 HOURS PRN
Status: DISCONTINUED | OUTPATIENT
Start: 2025-04-29 | End: 2025-05-02 | Stop reason: HOSPADM

## 2025-04-29 RX ORDER — TRANEXAMIC ACID 10 MG/ML
INJECTION, SOLUTION INTRAVENOUS AS NEEDED
Status: DISCONTINUED | OUTPATIENT
Start: 2025-04-29 | End: 2025-04-29

## 2025-04-29 RX ORDER — ROCURONIUM BROMIDE 10 MG/ML
INJECTION, SOLUTION INTRAVENOUS AS NEEDED
Status: DISCONTINUED | OUTPATIENT
Start: 2025-04-29 | End: 2025-04-29

## 2025-04-29 RX ORDER — SODIUM CHLORIDE, SODIUM LACTATE, POTASSIUM CHLORIDE, CALCIUM CHLORIDE 600; 310; 30; 20 MG/100ML; MG/100ML; MG/100ML; MG/100ML
100 INJECTION, SOLUTION INTRAVENOUS CONTINUOUS
Status: CANCELLED | OUTPATIENT
Start: 2025-04-29

## 2025-04-29 RX ORDER — ONDANSETRON 2 MG/ML
INJECTION INTRAMUSCULAR; INTRAVENOUS AS NEEDED
Status: DISCONTINUED | OUTPATIENT
Start: 2025-04-29 | End: 2025-04-29

## 2025-04-29 RX ORDER — GLYCOPYRROLATE 0.2 MG/ML
INJECTION INTRAMUSCULAR; INTRAVENOUS AS NEEDED
Status: DISCONTINUED | OUTPATIENT
Start: 2025-04-29 | End: 2025-04-29

## 2025-04-29 RX ORDER — SODIUM CHLORIDE 9 MG/ML
INJECTION, SOLUTION INTRAVENOUS CONTINUOUS PRN
Status: DISCONTINUED | OUTPATIENT
Start: 2025-04-29 | End: 2025-04-29

## 2025-04-29 RX ORDER — DIAZEPAM 10 MG/2ML
5 INJECTION, SOLUTION INTRAMUSCULAR; INTRAVENOUS EVERY 6 HOURS PRN
Status: CANCELLED | OUTPATIENT
Start: 2025-04-29

## 2025-04-29 RX ORDER — LIDOCAINE HYDROCHLORIDE 20 MG/ML
INJECTION, SOLUTION EPIDURAL; INFILTRATION; INTRACAUDAL; PERINEURAL AS NEEDED
Status: DISCONTINUED | OUTPATIENT
Start: 2025-04-29 | End: 2025-04-29

## 2025-04-29 RX ORDER — METHADONE HYDROCHLORIDE 10 MG/ML
INJECTION, SOLUTION INTRAMUSCULAR; INTRAVENOUS; SUBCUTANEOUS AS NEEDED
Status: DISCONTINUED | OUTPATIENT
Start: 2025-04-29 | End: 2025-04-29

## 2025-04-29 RX ORDER — ACETAMINOPHEN 10 MG/ML
INJECTION, SOLUTION INTRAVENOUS AS NEEDED
Status: DISCONTINUED | OUTPATIENT
Start: 2025-04-29 | End: 2025-04-29

## 2025-04-29 RX ORDER — FENTANYL CITRATE 50 UG/ML
INJECTION, SOLUTION INTRAMUSCULAR; INTRAVENOUS AS NEEDED
Status: DISCONTINUED | OUTPATIENT
Start: 2025-04-29 | End: 2025-04-29

## 2025-04-29 RX ORDER — MINERAL OIL AND PETROLATUM 150; 830 MG/G; MG/G
OINTMENT OPHTHALMIC AS NEEDED
Status: DISCONTINUED | OUTPATIENT
Start: 2025-04-29 | End: 2025-04-29

## 2025-04-29 RX ORDER — CEFAZOLIN SODIUM 1 G/50ML
1000 SOLUTION INTRAVENOUS EVERY 8 HOURS
Status: CANCELLED | OUTPATIENT
Start: 2025-04-29 | End: 2025-05-01

## 2025-04-29 RX ORDER — ALBUMIN HUMAN 50 G/1000ML
SOLUTION INTRAVENOUS CONTINUOUS PRN
Status: DISCONTINUED | OUTPATIENT
Start: 2025-04-29 | End: 2025-04-29

## 2025-04-29 RX ORDER — MIDAZOLAM HYDROCHLORIDE 2 MG/2ML
INJECTION, SOLUTION INTRAMUSCULAR; INTRAVENOUS AS NEEDED
Status: DISCONTINUED | OUTPATIENT
Start: 2025-04-29 | End: 2025-04-29

## 2025-04-29 RX ORDER — DEXMEDETOMIDINE HYDROCHLORIDE 4 UG/ML
0.2 INJECTION, SOLUTION INTRAVENOUS
Status: DISCONTINUED | OUTPATIENT
Start: 2025-04-29 | End: 2025-04-29

## 2025-04-29 RX ORDER — DIAZEPAM 10 MG/2ML
5 INJECTION, SOLUTION INTRAMUSCULAR; INTRAVENOUS EVERY 6 HOURS
Status: DISCONTINUED | OUTPATIENT
Start: 2025-04-29 | End: 2025-04-30

## 2025-04-29 RX ORDER — FAMOTIDINE 10 MG/ML
20 INJECTION, SOLUTION INTRAVENOUS 2 TIMES DAILY
Status: DISCONTINUED | OUTPATIENT
Start: 2025-04-29 | End: 2025-04-30

## 2025-04-29 RX ORDER — DEXMEDETOMIDINE HYDROCHLORIDE 4 UG/ML
0.2 INJECTION, SOLUTION INTRAVENOUS
Status: DISCONTINUED | OUTPATIENT
Start: 2025-04-29 | End: 2025-04-30

## 2025-04-29 RX ORDER — VANCOMYCIN HYDROCHLORIDE 1 G/20ML
INJECTION, POWDER, LYOPHILIZED, FOR SOLUTION INTRAVENOUS AS NEEDED
Status: DISCONTINUED | OUTPATIENT
Start: 2025-04-29 | End: 2025-04-29 | Stop reason: HOSPADM

## 2025-04-29 RX ORDER — ACETAMINOPHEN 10 MG/ML
1000 INJECTION, SOLUTION INTRAVENOUS EVERY 6 HOURS SCHEDULED
Status: DISCONTINUED | OUTPATIENT
Start: 2025-04-29 | End: 2025-05-01

## 2025-04-29 RX ORDER — KETOROLAC TROMETHAMINE 30 MG/ML
15 INJECTION, SOLUTION INTRAMUSCULAR; INTRAVENOUS EVERY 6 HOURS
Status: DISCONTINUED | OUTPATIENT
Start: 2025-04-29 | End: 2025-05-01

## 2025-04-29 RX ORDER — MORPHINE SULFATE 10 MG/ML
INJECTION, SOLUTION INTRAMUSCULAR; INTRAVENOUS AS NEEDED
Status: DISCONTINUED | OUTPATIENT
Start: 2025-04-29 | End: 2025-04-29

## 2025-04-29 RX ADMIN — SODIUM CHLORIDE: 0.9 INJECTION, SOLUTION INTRAVENOUS at 16:48

## 2025-04-29 RX ADMIN — Medication 100 MG: at 07:50

## 2025-04-29 RX ADMIN — METHADONE HYDROCHLORIDE 10 MG: 10 INJECTION, SOLUTION INTRAMUSCULAR; INTRAVENOUS; SUBCUTANEOUS at 09:04

## 2025-04-29 RX ADMIN — FENTANYL CITRATE 100 MCG: 50 INJECTION INTRAMUSCULAR; INTRAVENOUS at 16:20

## 2025-04-29 RX ADMIN — MIDAZOLAM 2 MG: 1 INJECTION INTRAMUSCULAR; INTRAVENOUS at 17:25

## 2025-04-29 RX ADMIN — FENTANYL CITRATE 50 MCG: 50 INJECTION INTRAMUSCULAR; INTRAVENOUS at 19:15

## 2025-04-29 RX ADMIN — ROCURONIUM BROMIDE 40 MG: 10 INJECTION, SOLUTION INTRAVENOUS at 09:30

## 2025-04-29 RX ADMIN — SUGAMMADEX 200 MG: 100 INJECTION, SOLUTION INTRAVENOUS at 10:48

## 2025-04-29 RX ADMIN — LIDOCAINE HYDROCHLORIDE 100 MG: 20 INJECTION, SOLUTION EPIDURAL; INFILTRATION; INTRACAUDAL at 07:48

## 2025-04-29 RX ADMIN — KETOROLAC TROMETHAMINE 15 MG: 30 INJECTION, SOLUTION INTRAMUSCULAR; INTRAVENOUS at 20:35

## 2025-04-29 RX ADMIN — PROPOFOL 100 MG: 10 INJECTION, EMULSION INTRAVENOUS at 08:10

## 2025-04-29 RX ADMIN — FENTANYL CITRATE 100 MCG: 50 INJECTION INTRAMUSCULAR; INTRAVENOUS at 07:48

## 2025-04-29 RX ADMIN — FENTANYL CITRATE 25 MCG: 50 INJECTION INTRAMUSCULAR; INTRAVENOUS at 15:44

## 2025-04-29 RX ADMIN — METHADONE HYDROCHLORIDE 5 MG: 10 INJECTION, SOLUTION INTRAMUSCULAR; INTRAVENOUS; SUBCUTANEOUS at 16:39

## 2025-04-29 RX ADMIN — Medication: at 19:37

## 2025-04-29 RX ADMIN — PHENYLEPHRINE HYDROCHLORIDE 50 MCG: 10 INJECTION INTRAVENOUS at 08:41

## 2025-04-29 RX ADMIN — SODIUM CHLORIDE, SODIUM LACTATE, POTASSIUM CHLORIDE, AND CALCIUM CHLORIDE: .6; .31; .03; .02 INJECTION, SOLUTION INTRAVENOUS at 14:05

## 2025-04-29 RX ADMIN — DIAZEPAM 5 MG: 10 INJECTION, SOLUTION INTRAMUSCULAR; INTRAVENOUS at 20:34

## 2025-04-29 RX ADMIN — Medication 100 MCG: at 16:53

## 2025-04-29 RX ADMIN — SODIUM CHLORIDE, SODIUM LACTATE, POTASSIUM CHLORIDE, AND CALCIUM CHLORIDE: .6; .31; .03; .02 INJECTION, SOLUTION INTRAVENOUS at 10:20

## 2025-04-29 RX ADMIN — SODIUM CHLORIDE: 0.9 INJECTION, SOLUTION INTRAVENOUS at 08:22

## 2025-04-29 RX ADMIN — TRANEXAMIC ACID 5 MG/KG/HR: 10 INJECTION, SOLUTION INTRAVENOUS at 08:55

## 2025-04-29 RX ADMIN — ONDANSETRON 4 MG: 2 INJECTION, SOLUTION INTRAMUSCULAR; INTRAVENOUS at 22:25

## 2025-04-29 RX ADMIN — ONDANSETRON 4 MG: 2 INJECTION, SOLUTION INTRAMUSCULAR; INTRAVENOUS at 18:10

## 2025-04-29 RX ADMIN — FENTANYL CITRATE 50 MCG: 50 INJECTION INTRAMUSCULAR; INTRAVENOUS at 13:18

## 2025-04-29 RX ADMIN — CHLORHEXIDINE GLUCONATE 15 ML: 1.2 SOLUTION ORAL at 06:51

## 2025-04-29 RX ADMIN — PHENYLEPHRINE HYDROCHLORIDE 40 MCG/MIN: 10 INJECTION INTRAVENOUS at 09:06

## 2025-04-29 RX ADMIN — MORPHINE SULFATE 5 MG: 10 INJECTION INTRAVENOUS at 18:08

## 2025-04-29 RX ADMIN — PROPOFOL 100 MG: 10 INJECTION, EMULSION INTRAVENOUS at 07:50

## 2025-04-29 RX ADMIN — FENTANYL CITRATE 100 MCG: 50 INJECTION INTRAMUSCULAR; INTRAVENOUS at 08:10

## 2025-04-29 RX ADMIN — FAMOTIDINE 20 MG: 10 INJECTION, SOLUTION INTRAVENOUS at 20:41

## 2025-04-29 RX ADMIN — PHENYLEPHRINE HYDROCHLORIDE 10 MCG/MIN: 10 INJECTION INTRAVENOUS at 08:26

## 2025-04-29 RX ADMIN — DEXAMETHASONE SODIUM PHOSPHATE 10 MG: 10 INJECTION, SOLUTION INTRAMUSCULAR; INTRAVENOUS at 07:50

## 2025-04-29 RX ADMIN — ACETAMINOPHEN 1000 MG: 1000 INJECTION, SOLUTION INTRAVENOUS at 17:17

## 2025-04-29 RX ADMIN — PROPOFOL 50 MG: 10 INJECTION, EMULSION INTRAVENOUS at 16:16

## 2025-04-29 RX ADMIN — MORPHINE SULFATE 5 MG: 10 INJECTION INTRAVENOUS at 17:59

## 2025-04-29 RX ADMIN — ALBUMIN (HUMAN): 12.5 INJECTION, SOLUTION INTRAVENOUS at 13:57

## 2025-04-29 RX ADMIN — SODIUM CHLORIDE: 0.9 INJECTION, SOLUTION INTRAVENOUS at 13:12

## 2025-04-29 RX ADMIN — PROPOFOL 300 MG: 10 INJECTION, EMULSION INTRAVENOUS at 07:48

## 2025-04-29 RX ADMIN — FENTANYL CITRATE 25 MCG: 50 INJECTION INTRAMUSCULAR; INTRAVENOUS at 16:03

## 2025-04-29 RX ADMIN — GLYCOPYRROLATE 0.1 MG: 0.2 INJECTION, SOLUTION INTRAMUSCULAR; INTRAVENOUS at 07:54

## 2025-04-29 RX ADMIN — ALBUMIN (HUMAN): 12.5 INJECTION, SOLUTION INTRAVENOUS at 08:54

## 2025-04-29 RX ADMIN — ACETAMINOPHEN 1000 MG: 10 INJECTION INTRAVENOUS at 22:25

## 2025-04-29 RX ADMIN — WHITE PETROLATUM 57.7 %-MINERAL OIL 31.9 % EYE OINTMENT 1 APPLICATION: at 07:50

## 2025-04-29 RX ADMIN — PROPOFOL 130 MCG/KG/MIN: 10 INJECTION, EMULSION INTRAVENOUS at 08:22

## 2025-04-29 RX ADMIN — PHENYLEPHRINE HYDROCHLORIDE 100 MCG: 10 INJECTION INTRAVENOUS at 16:06

## 2025-04-29 RX ADMIN — SODIUM CHLORIDE, SODIUM LACTATE, POTASSIUM CHLORIDE, AND CALCIUM CHLORIDE: .6; .31; .03; .02 INJECTION, SOLUTION INTRAVENOUS at 07:41

## 2025-04-29 RX ADMIN — CEFAZOLIN 3000 MG: 1 INJECTION, POWDER, FOR SOLUTION INTRAMUSCULAR; INTRAVENOUS at 16:28

## 2025-04-29 RX ADMIN — CEFAZOLIN 3000 MG: 1 INJECTION, POWDER, FOR SOLUTION INTRAMUSCULAR; INTRAVENOUS at 08:54

## 2025-04-29 RX ADMIN — CEFAZOLIN 3000 MG: 1 INJECTION, POWDER, FOR SOLUTION INTRAMUSCULAR; INTRAVENOUS at 12:57

## 2025-04-29 RX ADMIN — PROPOFOL 50 MG: 10 INJECTION, EMULSION INTRAVENOUS at 13:15

## 2025-04-29 RX ADMIN — ALBUMIN (HUMAN): 12.5 INJECTION, SOLUTION INTRAVENOUS at 11:38

## 2025-04-29 RX ADMIN — TRANEXAMIC ACID 1000 MG: 10 INJECTION, SOLUTION INTRAVENOUS at 08:43

## 2025-04-29 RX ADMIN — Medication 50 MG: at 07:54

## 2025-04-29 RX ADMIN — SODIUM CHLORIDE, SODIUM LACTATE, POTASSIUM CHLORIDE, AND CALCIUM CHLORIDE 100 ML/HR: .6; .31; .03; .02 INJECTION, SOLUTION INTRAVENOUS at 19:47

## 2025-04-29 RX ADMIN — PROPOFOL 50 MG: 10 INJECTION, EMULSION INTRAVENOUS at 18:08

## 2025-04-29 RX ADMIN — MIDAZOLAM 2 MG: 1 INJECTION INTRAMUSCULAR; INTRAVENOUS at 07:39

## 2025-04-29 RX ADMIN — REMIFENTANIL HYDROCHLORIDE 0.1 MCG/KG/MIN: 1 INJECTION, POWDER, LYOPHILIZED, FOR SOLUTION INTRAVENOUS at 08:22

## 2025-04-29 RX ADMIN — PHENYLEPHRINE HYDROCHLORIDE 100 MCG: 10 INJECTION INTRAVENOUS at 12:50

## 2025-04-29 NOTE — ANESTHESIA POSTPROCEDURE EVALUATION
Post-Op Assessment Note    CV Status:  Stable  Pain Score: 0    Pain management: adequate       Mental Status:  Alert and awake   Hydration Status:  Euvolemic   PONV Controlled:  None   Airway Patency:  Patent  Two or more mitigation strategies used for obstructive sleep apnea   Post Op Vitals Reviewed: Yes    No anethesia notable event occurred.    Staff: Anesthesiologist, CRNA   Comments: report given to RN; PICU attending CHRISTINA Gary; 6l/min facemask for transport; accompanied by OR RN, anesthesia, anesthesia tech and orthoresident          Last Filed PACU Vitals:  Vitals Value Taken Time   Temp 99.3 @1938   Pulse 120 04/29/25 1937   /86 04/29/25 1933   Resp 12 04/29/25 1937   SpO2 93 % 04/29/25 1937   Vitals shown include unfiled device data.

## 2025-04-29 NOTE — H&P
H&P - Pediatric Intensive Care   Name: Luciana Orozco 19 y.o. adult I MRN: [id number]  Unit/Bed#: PICU 333-01 I Date of Admission: 4/29/2025   Date of Service: 4/29/2025 I Hospital Day: 0       Assessment & Plan   19-year-old female presents to PACU status post T3-L2 posterior spinal fusion with instrumentation by Dr. Padilla,  POD 0.          Neuro:   - Dilaudid PCA pump for pain  - IV Tylenol every 8 hours  -Precedex gtt  -Diazepam 5 mg IV q6h PRN  -Neuro checks every 2 hours for 8 hours then every 4 hours          CV:  -Maintain arterial line  -Telemetry monitoring  -Maintain MAP goal >65. Currently off vasopressors          Pulm:   -Incentive spirometry  -O2 saturation goal >95% while awake when PCA in use          GI:   -Famotidine IV q12h for GI prophylaxis  -Zofran q8h PRN for nausea          FEN:   -LR @ 100 mL/hr  -Currently NPO. Will advance to clear liquids          :   -Strict I/O          ID:   -Cefazolin IV q8h x 48 hours   -Continue to trend WBC count and monitor for signs of infection          Heme:   -Estimated blood loss during surgery 675 cc, 273 cc obtained through cell saver  -Will order CBC to check Hgb, CBC and CMP tomorrow AM          Endo:   -No active issues                     Msk/Skin:   -PT/OT consult  -Log roll q2h          Disposition: PICU    History of Present Illness   Luciana Orozco is a 19 y.o. adult admitted critically ill to the PICU s/p T3-L2 spinal fusion, POD 0.  Patient presented today for scheduled procedure.  She was intubated with a 7.0 cuffed ETT.  During the procedure, she was given 7 L crystalloid fluid, 750 cc albumin.  She had estimated blood loss 675 cc, to 73 cc obtained through Cell Saver.  She had a urine output of 2500 cc.  Surgical prophylaxis Ancef given.    Review of Systems   Unable to perform ROS: Other (sedated)     Historical Information   Medical History Review: I have reviewed the patient's PMH, PSH, Social History, Family History, Meds, and  Allergies   Historical Information   Past Medical History:   Diagnosis Date    Anxiety     Asthma     Depression     Eczema      Past Surgical History:   Procedure Laterality Date    WISDOM TOOTH EXTRACTION       Social History     Tobacco Use    Smoking status: Every Day     Types: Cigarettes    Smokeless tobacco: Current    Tobacco comments:     Smokes 1-2 cigarettes per day and vapes daily     Vaping Use    Vaping status: Every Day    Substances: Nicotine, THC, Flavoring   Substance and Sexual Activity    Alcohol use: Not Currently    Drug use: Yes     Types: Marijuana, Psilocybin     Comment: daily marijuana; mushroom(has not used for over a year)    Sexual activity: Yes     Partners: Male, Female     Birth control/protection: Pill, OCP, Condom Male     E-Cigarette/Vaping    E-Cigarette Use Current Every Day User      E-Cigarette/Vaping Substances    Nicotine Yes     THC Yes     CBD No     Flavoring Yes     Other No     Unknown No      Family history non-contributory  Social History     Tobacco Use    Smoking status: Every Day     Types: Cigarettes    Smokeless tobacco: Current    Tobacco comments:     Smokes 1-2 cigarettes per day and vapes daily     Vaping Use    Vaping status: Every Day    Substances: Nicotine, THC, Flavoring   Substance and Sexual Activity    Alcohol use: Not Currently    Drug use: Yes     Types: Marijuana, Psilocybin     Comment: daily marijuana; mushroom(has not used for over a year)    Sexual activity: Yes     Partners: Male, Female     Birth control/protection: Pill, OCP, Condom Male       Current Facility-Administered Medications:     acetaminophen (Ofirmev) injection 1,000 mg, Q6H MOIRA    [START ON 4/30/2025] ceFAZolin (ANCEF) IVPB (premix in dextrose) 2,000 mg 50 mL, Q8H    dexmedeTOMIDine (Precedex) 400 mcg in sodium chloride 0.9% 100 mL, Titrated    diazepam (VALIUM) injection 5 mg, Q6H    Famotidine (PF) (PEPCID) injection 20 mg, BID    HYDROmorphone (DILAUDID) 1 mg/mL 50 mL PCA,  Continuous    ketorolac (TORADOL) injection 15 mg, Q6H    lactated ringers infusion, Continuous, Last Rate: 100 mL/hr (25)    naloxone (NARCAN) injection 0.1 mg, Q3 min PRN    ondansetron (ZOFRAN) injection 4 mg, Q8H PRN  Prior to Admission Medications   Prescriptions Last Dose Informant Patient Reported? Taking?   Cholecalciferol (VITAMIN D3) 1,000 units tablet 2025  No Yes   Sig: Take 2 tablets (2,000 Units total) by mouth daily      Facility-Administered Medications: None     Benadryl [diphenhydramine], Mangifera indica, and Pineapple - food allergy  Growth and Development: normal  Nutrition: age appropriate  Immunizations: up to date and documented  Flu Shot: No   Family History: Family history non-contributory    Social History   School/: No   Tobacco exposure: No   Pets: Yes   Travel: No   Household: lives at home with grandmother, sibling, boyfriend  Drug Use:   Occasional THC  Tobacco Use:   Nicotine vape  Alcohol Use:  none    Objective :  Temp:  [96 °F (35.6 °C)-99.5 °F (37.5 °C)] 99.5 °F (37.5 °C)  HR:  [] 117  BP: (138-148)/(80-95) 144/89  Resp:  [9-18] 9  SpO2:  [93 %-97 %] 96 %  O2 Device: Nasal cannula  Nasal Cannula O2 Flow Rate (L/min):  [2 L/min] 2 L/min    Arterial Line BP: 148/86  Arterial Line MAP (mmHg): 108 mmHg    Temperature:   Temp (24hrs), Av.3 °F (36.8 °C), Min:96 °F (35.6 °C), Max:99.5 °F (37.5 °C)    Current: Temperature: 99.5 °F (37.5 °C)    Weights:   IBW (Ideal Body Weight): 59.3 kg    Body mass index is 46.51 kg/m².  Weight (last 2 days)       Date/Time Weight    25 131 (288.14)    25 0619 122 (268)          Physical Exam  Vitals and nursing note reviewed.   Constitutional:       General: She is not in acute distress.     Appearance: Normal appearance. She is obese. She is not ill-appearing, toxic-appearing or diaphoretic.   HENT:      Head: Normocephalic and atraumatic.      Mouth/Throat:      Mouth: Mucous membranes are moist.    Eyes:      Conjunctiva/sclera: Conjunctivae normal.   Cardiovascular:      Rate and Rhythm: Normal rate and regular rhythm.      Pulses: Normal pulses.      Heart sounds: Normal heart sounds.   Pulmonary:      Effort: Pulmonary effort is normal.      Breath sounds: Normal breath sounds.   Musculoskeletal:      Cervical back: Neck supple.   Skin:     General: Skin is warm and dry.      Capillary Refill: Capillary refill takes less than 2 seconds.   Neurological:      Mental Status: She is alert.      GCS: GCS eye subscore is 4. GCS verbal subscore is 4. GCS motor subscore is 6.      Comments: Moving all extremities equally         Medications:   Scheduled Meds:  Current Facility-Administered Medications   Medication Dose Route Frequency Provider Last Rate    acetaminophen  1,000 mg Intravenous Q6H [facility] Papo Bourne DO      [START ON 4/30/2025] cefazolin  2,000 mg Intravenous Q8H Papo Bourne DO      dexmedetomidine  0.2 mcg/kg/hr (Adjusted) Intravenous Titrated Gabriel cMclain MD      diazepam  5 mg Intravenous Q6H Gabriel Mcclain MD      Famotidine (PF)  20 mg Intravenous BID Gabriel Mccalin MD      HYDROmorphone   Intravenous Continuous Joseph Owen Padilla MD      ketorolac  15 mg Intravenous Q6H Gabriel Mcclain MD      lactated ringers  100 mL/hr Intravenous Continuous Gabriel Mcclain  mL/hr (04/29/25 1947)    naloxone  0.1 mg Intravenous Q3 min PRN Gabriel Mcclain MD      ondansetron  4 mg Intravenous Q8H PRN Gabriel Mcclain MD       Continuous Infusions:dexmedetomidine, 0.2 mcg/kg/hr (Adjusted)  HYDROmorphone,   lactated ringers, 100 mL/hr, Last Rate: 100 mL/hr (04/29/25 1947)      PRN Meds:  naloxone, 0.1 mg, Q3 min PRN  ondansetron, 4 mg, Q8H PRN      Invasive lines and devices:  Invasive Devices       Peripheral Intravenous Line  Duration             Peripheral IV 04/29/25 Left Hand <1 day    Peripheral IV 04/29/25 Right Hand <1 day              Arterial Line  Duration             Arterial Line  "04/29/25 Left Radial <1 day              Drain  Duration             Closed/Suction Drain Inferior;Right Back Accordion 10 Fr. <1 day    Urethral Catheter Non-latex;Temperature probe 16 Fr. <1 day                  Non-Invasive/Invasive Ventilation Settings:  Respiratory      Lab Data (Last 4 hours)      None           O2/Vent Data (Last 4 hours)      None                  SpO2: SpO2: 96 %, SpO2 Activity: SpO2 Activity: At Rest, SpO2 Device: O2 Device: Nasal cannula      Lab Results: I have reviewed the following results:  Results from last 7 days   Lab Units 04/29/25  1755 04/29/25  1354 04/29/25  0856   I STAT HEMOGLOBIN g/dl 11.2* 11.6* 12.6   HEMATOCRIT, ISTAT % 33* 34* 37      Results from last 7 days   Lab Units 04/29/25 1755 04/29/25  1354 04/29/25  0856   CO2, I-STAT mmol/L 19* 21 24   GLUCOSE, ISTAT mg/dl 100 110 113                 No results found for: \"PHART\", \"QWQ9KVR\", \"PO2ART\", \"VQM6OAQ\", \"L0ZHSMNA\", \"BEART\", \"SOURCE\"    Micro:  No results found for: \"BLOODCX\", \"URINECX\", \"WOUNDCULT\", \"SPUTUMCULTUR\"    Imaging Results Review: I reviewed radiology reports from this admission including: xray(s) and procedure reports.  Other Study Results Review: EKG was reviewed.     Code Status: Prior      "

## 2025-04-29 NOTE — ANESTHESIA PROCEDURE NOTES
"Arterial Line Insertion    Performed by: Jigna Mercedes CRNA  Authorized by: Terrance Voss MD  Consent: Verbal consent obtained.  Risks and benefits: risks, benefits and alternatives were discussed  Consent given by: patient  Patient understanding: patient states understanding of the procedure being performed  Patient consent: the patient's understanding of the procedure matches consent given  Procedure consent: procedure consent matches procedure scheduled  Relevant documents: relevant documents present and verified  Test results: test results available and properly labeled  Site marked: the operative site was marked  Radiology Images: Radiology Images displayed and confirmed. If images not available, report reviewed  Time out: Immediately prior to procedure a \"time out\" was called to verify the correct patient, procedure, equipment, support staff and site/side marked as required.  Preparation: Patient was prepped and draped in the usual sterile fashion.  Indications: hemodynamic monitoring  Orientation:  Left  Location: radial artery  Sedation:  Patient sedated: yes  Analgesia: see MAR for details    Procedure Details:      Needle gauge: 20  Placement technique:  Anatomical landmarks and palpation  Number of attempts: 1    Post-procedure:  Post-procedure: dressing applied  Waveform: good waveform  Post-procedure CNS: normal  Patient tolerance: Patient tolerated the procedure well with no immediate complications  Comments: Atraumatic x 1 attempt           "

## 2025-04-29 NOTE — INTERVAL H&P NOTE
H&P reviewed. After examining the patient I find no changes in the patients condition since the H&P had been written.    Vitals:    04/29/25 0619   BP: 138/80   Pulse: 87   Resp: 18   Temp: (!) 96 °F (35.6 °C)   SpO2: 97%     General:  Constitutional: Patient is cooperative. Does not have a sickly appearance. Does not appear ill. No distress.   Head: Atraumatic.   Eyes: Conjunctivae are normal.   Cardiovascular: 2+ radial pulses bilaterally with brisk cap refill of all fingers.   Pulmonary/Chest: Effort normal. No stridor.   Abdomen: soft NT/ND  Skin: Skin is warm and dry. No rash noted. No erythema. No skin breakdown.  Psychiatric: mood/affect appropriate, behavior is normal   Gait: Appropriate gait observed per baseline ambulatory status.

## 2025-04-30 LAB
ABO GROUP BLD BPU: NORMAL
ABO GROUP BLD BPU: NORMAL
ALBUMIN SERPL BCG-MCNC: 3.6 G/DL (ref 3.5–5)
ALP SERPL-CCNC: 48 U/L (ref 34–104)
ALT SERPL W P-5'-P-CCNC: 17 U/L (ref 7–52)
ANION GAP SERPL CALCULATED.3IONS-SCNC: 7 MMOL/L (ref 4–13)
AST SERPL W P-5'-P-CCNC: 28 U/L (ref 5–45)
BASOPHILS # BLD AUTO: 0.03 THOUSANDS/ÂΜL (ref 0–0.1)
BASOPHILS NFR BLD AUTO: 0 % (ref 0–1)
BILIRUB SERPL-MCNC: 0.45 MG/DL (ref 0.2–1)
BPU ID: NORMAL
BPU ID: NORMAL
BUN SERPL-MCNC: 4 MG/DL (ref 5–25)
CALCIUM SERPL-MCNC: 8 MG/DL (ref 8.4–10.2)
CHLORIDE SERPL-SCNC: 109 MMOL/L (ref 96–108)
CO2 SERPL-SCNC: 24 MMOL/L (ref 21–32)
CREAT SERPL-MCNC: 0.53 MG/DL (ref 0.6–1.3)
CROSSMATCH: NORMAL
CROSSMATCH: NORMAL
EOSINOPHIL # BLD AUTO: 0 THOUSAND/ÂΜL (ref 0–0.61)
EOSINOPHIL NFR BLD AUTO: 0 % (ref 0–6)
ERYTHROCYTE [DISTWIDTH] IN BLOOD BY AUTOMATED COUNT: 12.3 % (ref 11.6–15.1)
GLUCOSE P FAST SERPL-MCNC: 115 MG/DL (ref 65–99)
GLUCOSE SERPL-MCNC: 115 MG/DL (ref 65–140)
HCT VFR BLD AUTO: 29.4 % (ref 36.5–46.1)
HGB BLD-MCNC: 9.9 G/DL (ref 12–15.4)
IMM GRANULOCYTES # BLD AUTO: 0.11 THOUSAND/UL (ref 0–0.2)
IMM GRANULOCYTES NFR BLD AUTO: 1 % (ref 0–2)
LYMPHOCYTES # BLD AUTO: 1.47 THOUSANDS/ÂΜL (ref 0.6–4.47)
LYMPHOCYTES NFR BLD AUTO: 10 % (ref 14–44)
MCH RBC QN AUTO: 30.5 PG (ref 26.8–34.3)
MCHC RBC AUTO-ENTMCNC: 33.7 G/DL (ref 31.4–37.4)
MCV RBC AUTO: 91 FL (ref 82–98)
MONOCYTES # BLD AUTO: 1.56 THOUSAND/ÂΜL (ref 0.17–1.22)
MONOCYTES NFR BLD AUTO: 11 % (ref 4–12)
NEUTROPHILS # BLD AUTO: 11.03 THOUSANDS/ÂΜL (ref 1.85–7.62)
NEUTS SEG NFR BLD AUTO: 78 % (ref 43–75)
NRBC BLD AUTO-RTO: 0 /100 WBCS
PLATELET # BLD AUTO: 252 THOUSANDS/UL (ref 149–390)
PMV BLD AUTO: 8.8 FL (ref 8.9–12.7)
POTASSIUM SERPL-SCNC: 3.5 MMOL/L (ref 3.5–5.3)
PROT SERPL-MCNC: 5.5 G/DL (ref 6.4–8.4)
RBC # BLD AUTO: 3.25 MILLION/UL (ref 3.88–5.12)
SODIUM SERPL-SCNC: 140 MMOL/L (ref 135–147)
UNIT DISPENSE STATUS: NORMAL
UNIT DISPENSE STATUS: NORMAL
UNIT PRODUCT CODE: NORMAL
UNIT PRODUCT CODE: NORMAL
UNIT PRODUCT VOLUME: 300 ML
UNIT PRODUCT VOLUME: 300 ML
UNIT RH: NORMAL
UNIT RH: NORMAL
WBC # BLD AUTO: 14.2 THOUSAND/UL (ref 4.31–10.16)

## 2025-04-30 PROCEDURE — 80053 COMPREHEN METABOLIC PANEL: CPT

## 2025-04-30 PROCEDURE — 97166 OT EVAL MOD COMPLEX 45 MIN: CPT

## 2025-04-30 PROCEDURE — 85025 COMPLETE CBC W/AUTO DIFF WBC: CPT

## 2025-04-30 PROCEDURE — 97163 PT EVAL HIGH COMPLEX 45 MIN: CPT

## 2025-04-30 PROCEDURE — 99232 SBSQ HOSP IP/OBS MODERATE 35: CPT | Performed by: PEDIATRICS

## 2025-04-30 PROCEDURE — 99024 POSTOP FOLLOW-UP VISIT: CPT | Performed by: ORTHOPAEDIC SURGERY

## 2025-04-30 RX ORDER — POLYETHYLENE GLYCOL 3350 17 G/17G
17 POWDER, FOR SOLUTION ORAL DAILY
Status: DISCONTINUED | OUTPATIENT
Start: 2025-04-30 | End: 2025-05-02 | Stop reason: HOSPADM

## 2025-04-30 RX ORDER — SENNOSIDES 8.6 MG
2 TABLET ORAL
Status: DISCONTINUED | OUTPATIENT
Start: 2025-04-30 | End: 2025-05-02 | Stop reason: HOSPADM

## 2025-04-30 RX ORDER — GABAPENTIN 300 MG/1
300 CAPSULE ORAL DAILY
Status: DISCONTINUED | OUTPATIENT
Start: 2025-04-30 | End: 2025-05-02 | Stop reason: HOSPADM

## 2025-04-30 RX ORDER — FAMOTIDINE 20 MG/1
20 TABLET, FILM COATED ORAL 2 TIMES DAILY
Status: DISCONTINUED | OUTPATIENT
Start: 2025-04-30 | End: 2025-05-02 | Stop reason: HOSPADM

## 2025-04-30 RX ORDER — OXYCODONE HYDROCHLORIDE 5 MG/1
5 TABLET ORAL EVERY 4 HOURS PRN
Refills: 0 | Status: DISCONTINUED | OUTPATIENT
Start: 2025-04-30 | End: 2025-05-02 | Stop reason: HOSPADM

## 2025-04-30 RX ORDER — DIAZEPAM 5 MG/1
5 TABLET ORAL EVERY 6 HOURS
Status: DISCONTINUED | OUTPATIENT
Start: 2025-04-30 | End: 2025-05-02 | Stop reason: HOSPADM

## 2025-04-30 RX ADMIN — FAMOTIDINE 20 MG: 20 TABLET, FILM COATED ORAL at 17:17

## 2025-04-30 RX ADMIN — KETOROLAC TROMETHAMINE 15 MG: 30 INJECTION, SOLUTION INTRAMUSCULAR; INTRAVENOUS at 19:41

## 2025-04-30 RX ADMIN — Medication 2000 UNITS: at 19:41

## 2025-04-30 RX ADMIN — DOCUSATE SODIUM 50 MG: 50 CAPSULE, LIQUID FILLED ORAL at 17:17

## 2025-04-30 RX ADMIN — CEFAZOLIN SODIUM 2000 MG: 2 SOLUTION INTRAVENOUS at 08:27

## 2025-04-30 RX ADMIN — KETOROLAC TROMETHAMINE 15 MG: 30 INJECTION, SOLUTION INTRAMUSCULAR; INTRAVENOUS at 14:13

## 2025-04-30 RX ADMIN — ACETAMINOPHEN 1000 MG: 10 INJECTION INTRAVENOUS at 05:46

## 2025-04-30 RX ADMIN — KETOROLAC TROMETHAMINE 15 MG: 30 INJECTION, SOLUTION INTRAMUSCULAR; INTRAVENOUS at 08:27

## 2025-04-30 RX ADMIN — CEFAZOLIN SODIUM 2000 MG: 2 SOLUTION INTRAVENOUS at 01:06

## 2025-04-30 RX ADMIN — GABAPENTIN 300 MG: 300 CAPSULE ORAL at 12:00

## 2025-04-30 RX ADMIN — FAMOTIDINE 20 MG: 10 INJECTION, SOLUTION INTRAVENOUS at 08:27

## 2025-04-30 RX ADMIN — POLYETHYLENE GLYCOL 3350 17 G: 17 POWDER, FOR SOLUTION ORAL at 14:13

## 2025-04-30 RX ADMIN — KETOROLAC TROMETHAMINE 15 MG: 30 INJECTION, SOLUTION INTRAMUSCULAR; INTRAVENOUS at 02:06

## 2025-04-30 RX ADMIN — OXYCODONE HYDROCHLORIDE 5 MG: 5 TABLET ORAL at 23:19

## 2025-04-30 RX ADMIN — DIAZEPAM 5 MG: 5 TABLET ORAL at 19:41

## 2025-04-30 RX ADMIN — CEFAZOLIN SODIUM 2000 MG: 2 SOLUTION INTRAVENOUS at 17:47

## 2025-04-30 RX ADMIN — ACETAMINOPHEN 1000 MG: 10 INJECTION INTRAVENOUS at 12:00

## 2025-04-30 RX ADMIN — ACETAMINOPHEN 1000 MG: 10 INJECTION INTRAVENOUS at 23:16

## 2025-04-30 RX ADMIN — Medication: at 07:03

## 2025-04-30 RX ADMIN — ACETAMINOPHEN 1000 MG: 10 INJECTION INTRAVENOUS at 17:15

## 2025-04-30 RX ADMIN — DIAZEPAM 5 MG: 10 INJECTION, SOLUTION INTRAMUSCULAR; INTRAVENOUS at 08:27

## 2025-04-30 RX ADMIN — DIAZEPAM 5 MG: 5 TABLET ORAL at 14:13

## 2025-04-30 RX ADMIN — DIAZEPAM 5 MG: 10 INJECTION, SOLUTION INTRAMUSCULAR; INTRAVENOUS at 02:06

## 2025-04-30 NOTE — PROGRESS NOTES
"Progress Note - Orthopedics   Name: Luciana Orozco 19 y.o. 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 Doing well post op.   WBAT   Ancef 48 hrs  Scoli protocol  Multimodal pain control   PT/OT  Dispo: pending      Subjective   19 y.o.adult doing well. No acute events, no new complaints. Pain well controlled. Denies fevers, chills, CP, SOB, N/V, numbness or tingling.     Objective :  Temp:  [98.8 °F (37.1 °C)-99.9 °F (37.7 °C)] 98.8 °F (37.1 °C)  HR:  [] 82  BP: (119-162)/(63-95) 119/83  Resp:  [9-38] 22  SpO2:  [93 %-100 %] 99 %  O2 Device: EtCO2 nasal cannula  Nasal Cannula O2 Flow Rate (L/min):  [2 L/min] 2 L/min    Physical Exam  Musculoskeletal: Spine  HV drain holding suction, SS output in cannister   SILT L2-S1  Motor intact L2-S1  Toes WWP, palpable DP puls      Lab Results: I have reviewed the following results:  Recent Labs     04/29/25  1755 04/29/25 2047 04/29/25 2047 04/30/25  0523   WBC  --  22.52*  --  14.20*   HGB 11.2* 11.1*  --  9.9*   HCT 33* 35.1*  --  29.4*   PLT  --  316   < > 252   BUN  --   --   --  4*   CREATININE  --   --   --  0.53*    < > = values in this interval not displayed.     Blood Culture:  No results found for: \"BLOODCX\"  Wound Culture: No results found for: \"WOUNDCULT\"        "

## 2025-04-30 NOTE — PLAN OF CARE
Problem: Prexisting or High Potential for Compromised Skin Integrity  Goal: Skin integrity is maintained or improved  Description: INTERVENTIONS:- Identify patients at risk for skin breakdown- Assess and monitor skin integrity- Assess and monitor nutrition and hydration status- Monitor labs - Assess for incontinence - Turn and reposition patient- Assist with mobility/ambulation- Relieve pressure over bony prominences- Avoid friction and shearing- Provide appropriate hygiene as needed including keeping skin clean and dry- Evaluate need for skin moisturizer/barrier cream- Collaborate with interdisciplinary team - Patient/family teaching- Consider wound care consult   Outcome: Progressing     Problem: PAIN - ADULT  Goal: Verbalizes/displays adequate comfort level or baseline comfort level  Description: Interventions:- Encourage patient to monitor pain and request assistance- Assess pain using appropriate pain scale- Administer analgesics based on type and severity of pain and evaluate response- Implement non-pharmacological measures as appropriate and evaluate response- Consider cultural and social influences on pain and pain management- Notify physician/advanced practitioner if interventions unsuccessful or patient reports new pain  Outcome: Progressing     Problem: INFECTION - ADULT  Goal: Absence or prevention of progression during hospitalization  Description: INTERVENTIONS:- Assess and monitor for signs and symptoms of infection- Monitor lab/diagnostic results- Monitor all insertion sites, i.e. indwelling lines, tubes, and drains- Monitor endotracheal if appropriate and nasal secretions for changes in amount and color- Drake appropriate cooling/warming therapies per order- Administer medications as ordered- Instruct and encourage patient and family to use good hand hygiene technique- Identify and instruct in appropriate isolation precautions for identified infection/condition  Outcome: Progressing      Problem: SAFETY ADULT  Goal: Patient will remain free of falls  Description: INTERVENTIONS:- Educate patient/family on patient safety including physical limitations- Instruct patient to call for assistance with activity - Consult OT/PT to assist with strengthening/mobility - Keep Call bell within reach- Keep bed low and locked with side rails adjusted as appropriate- Keep care items and personal belongings within reach- Initiate and maintain comfort rounds- Make Fall Risk Sign visible to staff- Offer Toileting every prn Hours, in advance of need- IOutcome: Progressing  Goal: Maintain or return to baseline ADL function  Description: INTERVENTIONS:-  Assess patient's ability to carry out ADLs; assess patient's baseline for ADL function and identify physical deficits which impact ability to perform ADLs (bathing, care of mouth/teeth, toileting, grooming, dressing, etc.)- Assess/evaluate cause of self-care deficits - Assess range of motion- Assess patient's mobility; develop plan if impaired- Assess patient's need for assistive devices and provide as appropriate- Encourage maximum independence but intervene and supervise when necessary- Involve family in performance of ADLs- Assess for home care needs following discharge - Consider OT consult to assist with ADL evaluation and planning for discharge- Provide patient education as appropriate  INTERVENTIONS:-  Assess patient's ability to carry out ADLs; assess patient's baseline for ADL function and identify physical deficits which impact ability to perform ADLs (bathing, care of mouth/teeth, toileting, grooming, dressing, etc.)- Assess/evaluate cause of self-care deficits - Assess range of motion- Assess patient's mobility; develop plan if impaired- Assess patient's need for assistive devices and provide as appropriate- Encourage maximum independence but intervene and supervise when necessary- Involve family in performance of ADLs- Assess for home care needs following  discharge - Consider OT consult to assist with ADL evaluation and planning for discharge- Provide patient education as appropriate  Outcome: Progressing  Outcome: Progressing     Problem: DISCHARGE PLANNING  Goal: Discharge to home or other facility with appropriate resources  Description: INTERVENTIONS:- Identify barriers to discharge w/patient and caregiver- Arrange for needed discharge resources and transportation as appropriate- Identify discharge learning needs (meds, wound care, etc.)- Arrange for interpretive services to assist at discharge as needed- Refer to Case Management Department for coordinating discharge planning if the patient needs post-hospital services based on physician/advanced practitioner order or complex needs related to functional status, cognitive ability, or social support system  Outcome: Progressing     Problem: Knowledge Deficit  Goal: Patient/family/caregiver demonstrates understanding of disease process, treatment plan, medications, and discharge instructions  Description: Complete learning assessment and assess knowledge base.Interventions:- Provide teaching at level of understanding- Provide teaching via preferred learning methods  Outcome: Progressing     Problem: MOBILITY - ADULT  Goal: Maintain or return to baseline ADL function  Description: INTERVENTIONS:-  Assess patient's ability to carry out ADLs; assess patient's baseline for ADL function and identify physical deficits which impact ability to perform ADLs (bathing, care of mouth/teeth, toileting, grooming, dressing, etc.)- Assess/evaluate cause of self-care deficits - Assess range of motion- Assess patient's mobility; develop plan if impaired- Assess patient's need for assistive devices and provide as appropriate- Encourage maximum independence but intervene and supervise when necessary- Involve family in performance of ADLs- Assess for home care needs following discharge - Consider OT consult to assist with ADL evaluation  and planning for discharge- Provide patient education as appropriate  INTERVENTIONS:-  Assess patient's ability to carry out ADLs; assess patient's baseline for ADL function and identify physical deficits which impact ability to perform ADLs (bathing, care of mouth/teeth, toileting, grooming, dressing, etc.)- Assess/evaluate cause of self-care deficits - Assess range of motion- Assess patient's mobility; develop plan if impaired- Assess patient's need for assistive devices and provide as appropriate- Encourage maximum independence but intervene and supervise when necessary- Involve family in performance of ADLs- Assess for home care needs following discharge - Consider OT consult to assist with ADL evaluation and planning for discharge- Provide patient education as appropriate  Outcome: Progressing  GOutcome: Progressing

## 2025-04-30 NOTE — PROGRESS NOTES
Progress Note - Pediatric Intensive Care   Name: Luciana Orozco 19 y.o. adult I MRN: [id number]  Unit/Bed#: PICU 333-01 I Date of Admission: 2025   Date of Service: 2025 I Hospital Day: 0       Assessment & Plan   Luciana Orozco is a 18 y/o female with history of asthma, bipolar d/o, ADHD, obesity, and Scheuermann's disease, now POD#1 s/p T3-L2 posterior spinal fusion. Overall uncomplicated surgical case - no neuromonitoring concerns or excessive blood loss by report. Admitted postoperatively for neurovascular and hemodynamic monitoring; PICU level of care is warranted.     Neuro:   - ongoing monitoring  - continue IV Tylenol and Toradol ATC  - transition valium to PO  - d/c dilaudid PCA  - d/c precedex  - oxycodone PRN  - start gabapentin     CV:   - continuous monitoring  - d/c arterial line     Resp:   - continuous SpO2 monitoring in room air  - incentive spirometry     FEN/GI:   - advance to regular diet  - wean IVF as PO intake improves  - continue famotidine  - zofran PRN  - begin bowel regimen     :   - d/c tao  - strict I's/O's     ID:   - continue Ancef x48hr rosario-op     Heme:   - Hgb only slightly downtrending (likely dilutional)     Endo:   - no active issues                Msk/Skin:   - PT/OT  - appreciate ortho input     Disposition: PICU    Patient updated on plan at bedside.    24 Hour Events : No acute events overnight. Hemodynamics stable. Pain well controlled with dilaudid PCA. One episode of emesis.      Objective :  Temp:  [98.6 °F (37 °C)-99.9 °F (37.7 °C)] 98.6 °F (37 °C)  HR:  [] 85  BP: (117-162)/(56-95) 140/80  Resp:  [9-38] 26  SpO2:  [93 %-100 %] 95 %  O2 Device: None (Room air)  Nasal Cannula O2 Flow Rate (L/min):  [2 L/min] 2 L/min    Arterial Line BP: 131/73  Arterial Line MAP (mmHg): 95 mmHg    Temperature:   Temp (24hrs), Av.3 °F (37.4 °C), Min:98.6 °F (37 °C), Max:99.9 °F (37.7 °C)    Current: Temperature: 98.6 °F (37 °C)    Weights:   IBW (Ideal Body  Weight): 59.3 kg    Body mass index is 46.51 kg/m².  Weight (last 2 days)       Date/Time Weight    04/29/25 1937 131 (288.14)    04/29/25 0619 122 (268)          Physical Exam  Constitutional:       General: She is not in acute distress.     Appearance: She is obese.   HENT:      Head: Normocephalic and atraumatic.      Mouth/Throat:      Mouth: Mucous membranes are moist.   Eyes:      Conjunctiva/sclera: Conjunctivae normal.   Cardiovascular:      Rate and Rhythm: Normal rate and regular rhythm.      Pulses: Normal pulses.      Heart sounds: Normal heart sounds. No murmur heard.  Pulmonary:      Effort: Pulmonary effort is normal. No respiratory distress.      Breath sounds: Normal breath sounds. No wheezing or rales.   Abdominal:      General: There is no distension.      Palpations: Abdomen is soft.      Tenderness: There is no abdominal tenderness.   Musculoskeletal:         General: No swelling.      Cervical back: Normal range of motion and neck supple.      Right lower leg: No edema.      Left lower leg: No edema.      Comments: Drain in place; spinal dressing c/d/i   Skin:     General: Skin is warm and dry.      Capillary Refill: Capillary refill takes less than 2 seconds.   Neurological:      General: No focal deficit present.      Mental Status: She is alert and oriented to person, place, and time.         Medications:   Scheduled Meds:  Current Facility-Administered Medications   Medication Dose Route Frequency Provider Last Rate    acetaminophen  1,000 mg Intravenous Q6H [facility] Christina J Palladino, MD 1,000 mg (04/30/25 1200)    cefazolin  2,000 mg Intravenous Q8H Papo Bourne DO 2,000 mg (04/30/25 0827)    diazepam  5 mg Oral Q6H Christina J Palladino, MD      docusate sodium  50 mg Oral BID Christina J Palladino, MD      famotidine  20 mg Oral BID Christina J Palladino, MD      gabapentin  300 mg Oral Daily Christina J Palladino, MD      ketorolac  15 mg Intravenous Q6H Christina J Palladino, MD       lactated ringers  100 mL/hr Intravenous Continuous Gabriel Mcclain  mL/hr (04/29/25 1947)    ondansetron  4 mg Intravenous Q8H PRN Gabriel Mcclain MD      polyethylene glycol  17 g Oral Daily Christina J Palladino, MD      senna  2 tablet Oral HS PRN Christina J Palladino, MD       Continuous Infusions:lactated ringers, 100 mL/hr, Last Rate: 100 mL/hr (04/29/25 1947)      PRN Meds:  ondansetron, 4 mg, Q8H PRN  senna, 2 tablet, HS PRN      Invasive lines and devices:  Invasive Devices       Peripheral Intravenous Line  Duration             Peripheral IV 04/29/25 Left Hand 1 day    Peripheral IV 04/29/25 Right Hand 1 day              Drain  Duration             Urethral Catheter Non-latex;Temperature probe 16 Fr. 1 day    Closed/Suction Drain Inferior;Right Back Accordion 10 Fr. <1 day                  Non-Invasive/Invasive Ventilation Settings:  Respiratory      Lab Data (Last 4 hours)      None           O2/Vent Data (Last 4 hours)      None                  SpO2: SpO2: 96 % in room air    Intake and Outputs:  I/O         04/28 0701  04/29 0700 04/29 0701  04/30 0700 04/30 0701  05/01 0700    P.O.   300    I.V. (mL/kg)  8121.67 (62) 306 (2.34)    IV Piggyback  1774.56 50    Cell Saver  273     Total Intake(mL/kg)  36687.23 (77.63) 656 (5.01)    Urine (mL/kg/hr)  4700 (1.49) 150 (0.22)    Emesis/NG output  0     Drains  100     Blood  675     Total Output  5475 150    Net  +4694.23 +506           Unmeasured Emesis Occurrence  1 x           UOP: 1.3cc/kg/hour       Lab Results: I have reviewed the following results:  Results from last 7 days   Lab Units 04/30/25  0523 04/29/25 2047 04/29/25  1755   WBC Thousand/uL 14.20* 22.52*  --    HEMOGLOBIN g/dL 9.9* 11.1*  --    I STAT HEMOGLOBIN g/dl  --   --  11.2*   HEMATOCRIT % 29.4* 35.1*  --    HEMATOCRIT, ISTAT %  --   --  33*   PLATELETS Thousands/uL 252 316  --    SEGS PCT % 78* 91*  --    MONO PCT % 11 5  --    EOS PCT % 0 0  --       Results from last 7 days  "  Lab Units 04/30/25  0523 04/29/25  1755 04/29/25  1354 04/29/25  0856   SODIUM mmol/L 140  --   --   --    POTASSIUM mmol/L 3.5  --   --   --    CHLORIDE mmol/L 109*  --   --   --    CO2 mmol/L 24  --   --   --    CO2, I-STAT mmol/L  --  19* 21 24   BUN mg/dL 4*  --   --   --    CREATININE mg/dL 0.53*  --   --   --    CALCIUM mg/dL 8.0*  --   --   --    ALBUMIN g/dL 3.6  --   --   --    ALK PHOS U/L 48  --   --   --    ALT U/L 17  --   --   --    AST U/L 28  --   --   --    GLUCOSE, ISTAT mg/dl  --  100 110 113                 No results found for: \"PHART\", \"QZN0NOP\", \"PO2ART\", \"HEC0UGF\", \"X1WLZHHB\", \"BEART\", \"SOURCE\"    Micro:  No results found for: \"BLOODCX\", \"URINECX\", \"WOUNDCULT\", \"SPUTUMCULTUR\"        Code Status: Prior    Critical Care Time Statement: Upon my evaluation, this patient had a high probability of imminent or life-threatening deterioration due to acute post-operative complications, which required my direct attention, intervention, and personal management.  I spent a total of 45 minutes directly providing critical care services, including interpretation of complex medical databases, evaluating for the presence of life-threatening injuries or illnesses, complex medical decision making (to support/prevent further life-threatening deterioration)., interpretation of hemodynamic data, and titration of continuous IV medications (drips). This time is exclusive of procedures, teaching, family meetings, and any prior time recorded by providers other than myself.    "

## 2025-04-30 NOTE — ASSESSMENT & PLAN NOTE
18 y/o POD 0 PSF T3-L2 with apical jw osteotomies for Scheurmann's disease. Doing well post op.   WBAT   Multimodal pain control   PT/OT  Dispo: pending

## 2025-04-30 NOTE — OP NOTE
OPERATIVE REPORT  PATIENT NAME: Luciana Orozco    :  2005  MRN: [id number]  Pt Location: BE OR ROOM 18    SURGERY DATE: 2025    Surgeons and Role:     * Joseph Padilla MD - Primary     * Jacy Velázquez PA-C - Assisting     * Donavan Tian MD - Assisting     * Bhanu Carmichael MD - Assisting    Preop Diagnosis:  Scheurmann's disease [M42.00]    Post-Op Diagnosis Codes:     * Scheurmann's disease [M42.00]    Procedure(s):  T3-L2 posterior spinal fusion/arthrodesis  7-12 levels: 23861  with instrumentation    7-12 levels: 36135  jw osteotomies T8-T9, T9-T10, T10-T11, T11-T12,  82456, 25265 (x3)  autograft        allograft        3D computer-assisted navigation   94805    22 modifier - The work required for today's procedure was substantially more than typically required given the patient's dysplastic pedicles and high BMI (46 kg/m2).    Specimen(s):  * No specimens in log *    Estimated Blood Loss:   675 mL    Drains:  Closed/Suction Drain Inferior;Right Back Accordion 10 Fr. (Active)   Number of days: 0       Urethral Catheter Non-latex;Temperature probe 16 Fr. (Active)   Number of days: 0       Anesthesia Type:   General    Operative Indications:  Scheurmann's disease [M42.00]    Operative Findings:  No IONM changes      Complications:   None    Procedure and Technique:    The patient is a 19 y.o. year old adult with persistently symptomatic Scheuermann kyphosis refractory to conservative management. The most recent radiographs demonstrated an 82 degree thoracic kyphosis. We discussed general outcomes associated with this procedure and that there is no guarantee symptoms improve or that no new symptoms/problems arise. She elected to proceed. We have her on vitamin D supplementation.    I discussed the risks, benefits, and alternatives of the procedure with the patient and family.  Risks include but are not limited to bleeding, infection, neurologic or vascular injury, spinal  cord injury, paraplegia, pseudarthrosis, painful or prominent hardware, hardware loosening or breakage, and generalized risks of anesthesia.  Additional risks include the potential need for additional surgery in the future should there be progression of deformity within or beyond the extent of the region of instrumentation and fusion. Alternative treatments including conservative observation were and have always been offered but the patient elected to proceed with today's procedure.  The patient has demonstrated an appropriate understanding of the risks, benefits, and alternatives.  Informed consent has been obtained.    Description of Procedure:    The patient was taken to the operating room in the supine position on a stretcher. The patient underwent induction of general anesthesia. Intraoperative neuromonitoring was initiated and the baseline potentials were assessed and normal. The following forms of monitoring were utilized: SSEP (Somatosensory evoked potentials) and MEP stimulation (Motor evoked potentials). A tao catheter was inserted. Preoperative prophylactic antibiotic was administered during this preparation.    The patient was then positioned prone using the OSI (Ernesto Frame). Care was taken to pad all bony prominences. A time-out was performed to verify the correct patient; confirm the planned surgical site, planned procedure, and availability of implants; and introduce all members of the surgical and anesthesia teams.    After the skin was prepped and draped in a sterile fashion, a midline incision was made extending from the upper to the lower planned surgical fusion region. Electrocautery was used to obtain hemostasis and dissect to the deep fascia overlying the spinous processes for the full extent of the planned area of instrumentation and fusion. Using the electrocautery, we split the apophyses of the spinous processes enroute to subperiosteal dissection along the posterior elements. I worked  on the left side while my assistant surgeon worked on the right side during exposure. Exposure of posterior elements including laminae and facet joint capsules was completed using a combination of electrocautery and John retractor.  Hemostasis was achieved with Raytec gauze packing and Floseal when necessary out to the level of the transverse processes. To aid accurate exposure C-arm fluoroscopy was used to localize the anatomic region of the spine. The interspinous/supraspinous ligaments and facet joint capsular tissue above and below the planned fusion were preserved where possible. Wide facetectomies using a bone scalpel with facet joint cartilage and capsule removal and fusion were performed within the planned fusion.    Robotic-Assisted: Of note, the SeekPanda software was utilized prior to the case and instrumentation/screw locations/trajectories were templated before surgery. Stereotactic computer-assisted navigation (which additionally included but was not limited to detailed software-assisted preoperative planning of all anticipated screw trajectories/sizes/depths and intraoperative placement of fiducial markers and image-assisted registration) was utilized to increase precision during placement of instrumentation during scoliosis surgery (a high risk clinical scenario) while avoiding vital structures (the spinal canal, lungs, major vessels, esophagus, and more) within pathologically altered and narrow pedicles associated with severe surgical-magnitude scoliosis. Attention was then directed to the placement of implants according to the presurgical plan. The Novafora Solera 5.5/6.0 implant system was utilized. Pedicle screws were then placed using robotic computer-assisted navigation with sequential use of a high-speed ben, tap, and ball-tipped probe. Screws were placed at the planned levels and are detailed below.     LEFT  RIGHT  T3 5.5x25  5.5x25  T4 4.5x30u bilateral  T5 4.5x35u bilateral  T6 4.5x35u  bilateral  T7 5.5x40u bilateral  T8 5.5x40u bilateral  T9 5.5x50  5.5x45  T10 5.5x45  5.5x40  T11 6.0x45  6.0x40  T12 5.5x40  5.5x45 (the right T12 screw was removed after O-arm spin given a potential <2mm medial breach but not necessary for desired implant density)  L1 5.5x45 bilateral  L2 6.0x50 bilateral    Screws are polyaxial unless otherwise specified    During pedicle screw placement, screws were withheld above, within, and below the planned osteotomies sites. Leonardo osteotomies were performed at T8-T9, T9-T10, T10-T11, and T11-T12. This included resection of the cephalad spinous process, bilateral laminae and inferior articular facet, and ligamentum flavum. The posterior bony elements were carefully resected with a bone scalpel then removed with a rongeur taking care to peel any adherent ligamentum with a freer elevator. Ligamentum flavum was thick/hypertrophied and resected with a kerrison rongeur first on the convex then concave side. The kerrison then initiated an osteotomy of some of the caudal superior articular facet enroute to disconnecting the superior articular facet with a bone scalpel. Paddies were utilized to assist hemostasis then removed. There was no unwanted bleeding nor dural fluid.    MAPs were appropriate in anticipation of deformity correction.    Kyphosis was reduced with the cantilever technique. Both a 5.5 CoCr left-sided and 5.5mm Ti right-sided marlon were cut/contoured with a desired roughly 45-degree kyphotic bend in the set screws distally with sequential reduction towers and bilateral coordinated compression along the apex of the kyphosis. Reduction towers aided in the reduction caudal to cephalad to optimize force dispersion. Care was taken to inspect screws to ensure there was no pullout. Once all reduction towers were seated, set screws were tightened at all levels and the reduction towers removed. Marlon lengths were considered acceptable and under muscle as appropriate. A lateral  radiograph was obtained to ensure desired correction. Visually the kyphotic apex now appeared normal and the posterior lumbar skin crease at the bottom of the kyphotic apex appeared to have resolved.    Final tightening of all setscrews was performed. Three liters of normal saline were used to irrigate the wound. A high speed ben was used to remove cortical bone over the laminae and spinous processes. Locally harvested autograft from the posterior elements of the spine was combined with Medtronic Mastergraft Putty and [place] Strips allograft and placed along the posterior margins of the spine. Local vancomycin powder was placed within the wound.    The fascia then thick layer of fat was then closed with interrupted 0-vicryl. The subcutaneous connective tissues were approximated using interrupted 2-0 vicryl. A suprafascial JACKELYN drain was placed. Skin was reapproximated with 3-0 nylon horizontal mattress distally and otherwise a running subcuticular 3-0 monocryl suture. The incision was then covered with mastisol and steri-strips, sterile dressings and Hypafix tape.  The final instrument count was noted to be correct.  Then the drapes were removed and the patient was then carefully re-positioned into a supine position. The neuromonitor leads were removed. There were no neuromonitoring changes throughout the case. A flat plate chest x-ray was obtained to confirm no pneumothorax.    The patient was awakened from anesthesia.    The patient was noted to move all four extremities after awakening.  The patient was transported to the intensive care unit in stable condition.    Postoperative Plan:  The patient will be admitted to the ICU then transitioned to the floor for standard postoperative spine monitoring and pain management.  The patient is expected to stay for three to five days in the hospital.  We will obtain upright X-rays prior to discharge.  Outpatient postoperative follow-up will be in 2 weeks with clinical  examination for a wound check.  No X-rays will be obtained at the 2 week follow-up visit. At 6 weeks we will obtain standing AP and lateral spine x-rays.       I was present for the entire procedure.    Patient Disposition:  extubated and stable             SIGNATURE: Joseph Padilla MD  DATE: April 29, 2025  TIME: 8:23 PM

## 2025-04-30 NOTE — OCCUPATIONAL THERAPY NOTE
Occupational Therapy Evaluation     Patient Name: Luciana Orozco  Today's Date: 4/30/2025  Problem List  Principal Problem:    S/P spinal surgery  Active Problems:    Asthma    Smoker    Marijuana smoker    Past Medical History  Past Medical History:   Diagnosis Date    Anxiety     Asthma     Depression     Eczema      Past Surgical History  Past Surgical History:   Procedure Laterality Date    SC REPAIR COMPLEX TRUNK 2.6-7.5 CM N/A 4/29/2025    Procedure: robotic assisted posterior spinal fusion with instrumentation T3-L2, autograft/allograft, thoracic apical Leonardo osteotomies T8-T9, T9-T10, T10-T11, T11-T12;  Surgeon: Joseph Padilla MD;  Location: [facility];  Service: Orthopedics    WISDOM TOOTH EXTRACTION              04/30/25 1145   OT Last Visit   OT Visit Date 04/30/25   Note Type   Note type Evaluation   Pain Assessment   Pain Assessment Tool 0-10   Pain Score 10 - Worst Possible Pain   Pain Location/Orientation Location: Back   Patient's Stated Pain Goal No pain   Hospital Pain Intervention(s) Repositioned;Ambulation/increased activity;Emotional support   Restrictions/Precautions   Weight Bearing Precautions Per Order Yes   RLE Weight Bearing Per Order WBAT   LLE Weight Bearing Per Order WBAT   Braces or Orthoses   (hemovac drain)   Other Precautions Multiple lines;Telemetry;Fall Risk;Pain;Spinal precautions   Home Living   Type of Home House   Home Layout Two level;Stairs to enter with rails;Able to live on main level with bedroom/bathroom   Bathroom Shower/Tub Walk-in shower   Bathroom Toilet Raised   Bathroom Accessibility Accessible   Home Equipment Walker;Cane  (not using pta)   Prior Function   Level of [place] Independent with functional mobility;Independent with ADLs;Independent with IADLS   Lives With Family   Receives Help From Family   IADLs Independent with driving;Independent with meal prep;Independent with medication management   Falls in the last 6 months 0   Vocational  Part time employment   Lifestyle   Autonomy ind w adl/iadl, no ad mob. +    Reciprocal Relationships lives w grandmother and boyfriend, who can provide assistance as needed. also lives with brother who has autism   Service to Others PTE- [employer]   Intrinsic Gratification go to Cosmetology school   ADL   Where Assessed Edge of bed   Eating Assistance 5  Supervision/Setup   Grooming Assistance 5  Supervision/Setup   UB Bathing Assistance 5  Supervision/Setup   LB Bathing Assistance 4  Minimal Assistance   UB Dressing Assistance 5  Supervision/Setup   LB Dressing Assistance 4  Minimal Assistance   Toileting Assistance  5  Supervision/Setup   Functional Assistance 5  Supervision/Setup   Additional Comments pt's BF can A w LB ADL prn   Bed Mobility   Rolling L 4  Minimal assistance   Additional items Assist x 1;Increased time required;Verbal cues;LE management   Supine to Sit 4  Minimal assistance   Additional items Assist x 1;Increased time required;Verbal cues;LE management   Additional Comments found in bed, left in chair w all needs in reach and alarm on.   Transfers   Sit to Stand 4  Minimal assistance   Additional items Assist x 1;Increased time required;Verbal cues   Stand to Sit 4  Minimal assistance   Additional items Assist x 1;Increased time required;Verbal cues   Toilet transfer 4  Minimal assistance   Additional items Assist x 1;Increased time required;Verbal cues;Standard toilet  (+use of grab bars)   Additional Comments c rw. improved to CGA w time and repetition   Functional Mobility   Functional Mobility 4  Minimal assistance   Additional Comments ax1, EOB>bathroom>chair. improved to CGA/S w time.   Additional items Rolling walker   Balance   Static Sitting Fair +   Dynamic Sitting Fair   Static Standing Fair -   Dynamic Standing Poor +   Ambulatory Poor +   Activity Tolerance   Activity Tolerance Patient limited by pain   Medical Staff Made Aware dpt 2' pts med complexity, comorbidities and  regression from baseline.   Nurse Made Aware cleared and updated   RUE Assessment   RUE Assessment WFL   LUE Assessment   LUE Assessment WFL   Hand Function   Gross Motor Coordination Functional   Fine Motor Coordination Functional   Cognition   Overall Cognitive Status WFL   Arousal/Participation Responsive;Alert;Cooperative   Attention Within functional limits   Orientation Level Oriented X4   Memory Within functional limits   Following Commands Follows all commands and directions without difficulty   Comments very pleasant and cooperative w G recall of spinal precautions.   Assessment   Prognosis Good   Assessment Pt is a 19 y.o. non-binary patient admitted 4/29/25 for planned PSF T3-L2 2' Scheurmann's disease.. Pt WBAT in b/l LE's, has spinal precautions. Active OT eval and treat orders. PMH includes  has a past medical history of Anxiety, Asthma, Depression, and Eczema.. Pt lives w family in a 2 sh, can stay on first floor with walk in shower and raised toilet. Pta, pt was independent w/ ADL/IADL and functional mobility, was  driving and was not using any DME at baseline. Currently, pt is S for UB ADL, min A for LB ADL (family can a), and completed transfers/fm w min ax1 c rw, progressing to cga w time. From OT standpoint, pt is functioning at baseline level and does not appear to require additional acute OT at this time. Discussed pt's comfort with d/c from OT and any concerns with returning to previous environment; pt does not report any at this time. The patient's raw score on the AM-PAC Daily Activity inpatient short form is 21, standardized score is 44.27, greater than 39.4. Patients at this level are likely to benefit from discharge to home. Please refer to the recommendation of the Occupational Therapist for safe discharge planning. From OT perspective, pt should D/C HOME w family support when medically stable. Acute OT no longer rq at this time, D/C OT.   Goals   Patient Goals continue to feel better    Discharge Recommendation   Rehab Resource Intensity Level, OT No post-acute rehabilitation needs   AM-PAC Daily Activity Inpatient   Lower Body Dressing 3   Bathing 3   Toileting 3   Upper Body Dressing 4   Grooming 4   Eating 4   Daily Activity Raw Score 21   Daily Activity Standardized Score (Calc for Raw Score >=11) 44.27   AM-PAC Applied Cognition Inpatient   Following a Speech/Presentation 4   Understanding Ordinary Conversation 4   Taking Medications 4   Remembering Where Things Are Placed or Put Away 4   Remembering List of 4-5 Errands 4   Taking Care of Complicated Tasks 4   Applied Cognition Raw Score 24   Applied Cognition Standardized Score 62.21   Modified Baker Scale   Modified Kimberly Scale 3   End of Consult   Education Provided Yes;Family or social support of family present for education by provider  (grandmother provided edu on spinal precautions and transfer techniques.)   Patient Position at End of Consult All needs within reach;Bedside chair   Nurse Communication Nurse aware of consult       SARITA Cortez, OTR/L

## 2025-04-30 NOTE — PHYSICAL THERAPY NOTE
Physical Therapy Evaluation    Patient Name: Luciana Orozco    Today's Date: 4/30/2025     Problem List  Principal Problem:    S/P spinal surgery  Active Problems:    Asthma    Smoker    Marijuana smoker       Past Medical History  Past Medical History:   Diagnosis Date    Anxiety     Asthma     Depression     Eczema         Past Surgical History  Past Surgical History:   Procedure Laterality Date    AK REPAIR COMPLEX TRUNK 2.6-7.5 CM N/A 4/29/2025    Procedure: robotic assisted posterior spinal fusion with instrumentation T3-L2, autograft/allograft, thoracic apical Leonardo osteotomies T8-T9, T9-T10, T10-T11, T11-T12;  Surgeon: Joseph Padilla MD;  Location[facility];  Service: Orthopedics    WISDOM TOOTH EXTRACTION           04/30/25 1147   PT Last Visit   PT Visit Date 04/30/25   Note Type   Note type Evaluation   Pain Assessment   Pain Assessment Tool 0-10   Pain Score 10 - Worst Possible Pain   Pain Location/Orientation Location: Back   Restrictions/Precautions   Weight Bearing Precautions Per Order Yes   RUE Weight Bearing Per Order WBAT   LUE Weight Bearing Per Order WBAT   Braces or Orthoses   (no brace)   Other Precautions Chair Alarm;Bed Alarm;Multiple lines;Telemetry;Fall Risk;Pain;Spinal precautions  (hemovac)   Home Living   Type of Home House   Home Layout Two level;Able to live on main level with bedroom/bathroom;Stairs to enter with rails  (3 CESAR, able to sleep 1st floor with full bath but primary bed/full bath on 2nd floor)   Bathroom Shower/Tub Walk-in shower   Bathroom Toilet Raised   Home Equipment Walker;Cane   Additional Comments no use PTA   Prior Function   Level of [place] Independent with ADLs;Independent with functional mobility;Independent with IADLS   Lives With Family  (grandmother, brother, boyfriend)   Receives Help From Family   IADLs Independent with driving;Independent with meal prep;Independent with medication  management   Falls in the last 6 months 0   Comments works at beauty store. grandmother will be at home and she is able to physically A. boyfriend works but will be home assisting when off of work   General   Family/Caregiver Present Yes   Cognition   Overall Cognitive Status WFL   Arousal/Participation Cooperative   Orientation Level Oriented X4   Memory Within functional limits   Following Commands Follows all commands and directions without difficulty   RLE Assessment   RLE Assessment WFL   LLE Assessment   LLE Assessment WFL   Bed Mobility   Rolling R 4  Minimal assistance   Additional items Assist x 1;Increased time required;Verbal cues;LE management   Supine to Sit 4  Minimal assistance   Additional items Assist x 1;Increased time required;Verbal cues;LE management   Transfers   Sit to Stand 4  Minimal assistance   Additional items Assist x 1;Increased time required;Verbal cues   Stand to Sit 4  Minimal assistance   Additional items Assist x 1;Increased time required;Verbal cues   Toilet transfer 4  Minimal assistance   Additional items Assist x 1;Increased time required;Verbal cues;Standard toilet   Additional Comments c RW   Ambulation/Elevation   Gait pattern Improper Weight shift;Decreased foot clearance;Short stride;Excessively slow   Gait Assistance 4  Minimal assist   Additional items Assist x 1;Verbal cues   Assistive Device Rolling walker   Distance 10'x2   Stair Management Assistance Not tested   Balance   Static Sitting Fair -   Dynamic Sitting Poor +   Static Standing Poor +   Dynamic Standing Poor +   Ambulatory Poor +   Endurance Deficit   Endurance Deficit Yes   Activity Tolerance   Activity Tolerance Patient limited by pain   Medical Staff Made Aware OT   Nurse Made Aware yes-cleared; RN to mobilize in PM   Assessment   Prognosis Good   Problem List Decreased endurance;Impaired balance;Decreased mobility;Orthopedic restrictions;Pain;Decreased range of motion   Assessment Pt is a 19 y.o.  admitted to [facility] on 4/29/2025 for elective spinal fusion due to scheuermann's dz. S/p T3-L2 PSF.  Pt has the following comorbidities which affect their treatment: active problems listed above,  has a past medical history of Anxiety, Asthma, Depression, and Eczema.  , as well as personal factors including stairs to access home. Pt has a high complexity clinical presentation due to Ongoing medical management for primary dx, Increased reliance on more restrictive AD compared to baseline, Decreased activity tolerance compared to baseline, Fall risk, Increased assistance needed from caregiver at current time, Ongoing telemetry monitoring, Trending lab values, Spinal precautions at current time, Diagnostic imaging pending, Continuous pulse oximetry monitoring , JACKELYN drain in place at current time, s/p surgical intervention , and PMH. PT was consulted to evaluate pt's functional mobility and discharge needs. Upon evaluation, patient required minAx1 for all mobility as outlined above. VC for paced breathing due to pain and lightheadness-BP stable throughout. Body system impairments include impaired ROM, balance, endurance, +pain. Pt's functional activity impairments include: activity tolerance and mobility. Participation restrictions include inability to return to work or community based activities at this time. At conclusion of eval, pt remained seated in chair with phone, call bell, and all other personal needs within reach. Aware to not mobilize without staff. Pt would benefit from skilled PT to address their functional mobility limitations. The patient's AM-PAC Basic Mobility Inpatient Short Form Raw Score is 18. A Raw score of greater than 16 suggests the patient may benefit from discharge to home. Please also refer to the recommendation of the Physical Therapist for safe discharge planning.   Barriers to Discharge Inaccessible home environment   Goals   Patient Goals less pain   STG Expiration Date 05/14/25   Short Term  Goal #1 In 14 days, pt will: 1) perform bed mobility with S to promote functional independence  and decrease caregiver burden. 2) perform transfers to<>from all surfaces with S to promote safety and independence 3) ambulate 150' with S and least restrictive device to promote safe return to home 4) negotiate 13 stairs with S to promote safe return to home. 5) improve balance grades by 1/2 to promote safety with functional mobility.   PT Treatment Day 0   Plan   Treatment/Interventions Functional transfer training;LE strengthening/ROM;Elevations;Therapeutic exercise;Endurance training;Patient/family training;Bed mobility;Equipment eval/education;Gait training;Spoke to nursing;Spoke to case management;OT;Family   PT Frequency Other (Comment)  (daily)   Discharge Recommendation   Rehab Resource Intensity Level, PT No post-acute rehabilitation needs  (home pending stair negotiation-OPPT as needed after OP follow up with Dr. Padilla)   Equipment Recommended   (has RW PTA; would benefit from commode)   AM-PAC Basic Mobility Inpatient   Turning in Flat Bed Without Bedrails 3   Lying on Back to Sitting on Edge of Flat Bed Without Bedrails 3   Moving Bed to Chair 3   Standing Up From Chair Using Arms 3   Walk in Room 3   Climb 3-5 Stairs With Railing 3   Basic Mobility Inpatient Raw Score 18   Basic Mobility Standardized Score 41.05   [facility] Highest Level Of Mobility   -HL Goal 6: Walk 10 steps or more   -HL Achieved 6: Walk 10 steps or more   Modified [place] Scale   Modified Kimberly Scale 3   Barthel Index   Feeding 10   Bathing 0   Grooming Score 0   Dressing Score 5   Bladder Score 10   Bowels Score 10   Toilet Use Score 5   Transfers (Bed/Chair) Score 10   Mobility (Level Surface) Score 0   Stairs Score 5   Barthel Index Score 55   Pelon Goodman, PT, DPT, NCS

## 2025-04-30 NOTE — PROGRESS NOTES
"Progress Note - Orthopedics   Name: Luciana Orozco 19 y.o. adult I MRN: [id number]  Unit/Bed#: PICU 333-01 I Date of Admission: 4/29/2025   Date of Service: 4/29/2025 I Hospital Day: 0    Assessment & Plan  S/P spinal surgery   18 y/o POD 0 PSF T3-L2 with apical jw osteotomies for Scheurmann's disease. Doing well post op.   WBAT   Multimodal pain control   PT/OT  Dispo: pending    Subjective   19 y.o.adult  No acute events, no new complaints. Pain well controlled. Denies fevers, chills, CP, SOB, N/V, numbness or tingling. Patient reports no issues with urination or bowel movements.     Objective :  Temp:  [96 °F (35.6 °C)-99.9 °F (37.7 °C)] 99.5 °F (37.5 °C)  HR:  [] 97  BP: (137-162)/(73-95) 137/78  Resp:  [9-38] 20  SpO2:  [93 %-100 %] 100 %  O2 Device: Nasal cannula  Nasal Cannula O2 Flow Rate (L/min):  [2 L/min] 2 L/min    Physical Exam  Musculoskeletal: Spine  HV drain holding suction, SS output in cannister   SILT L2-S1  Motor intact L2-S1  Toes WWP, palpable DP pulse      Lab Results: I have reviewed the following results:  Recent Labs     04/29/25  1354 04/29/25  1755 04/29/25  2047   WBC  --   --  22.52*   HGB 11.6* 11.2* 11.1*   HCT 34* 33* 35.1*   PLT  --   --  316     Blood Culture:  No results found for: \"BLOODCX\"  Wound Culture: No results found for: \"WOUNDCULT\"      "

## 2025-04-30 NOTE — PLAN OF CARE
Problem: PHYSICAL THERAPY ADULT  Goal: Performs mobility at highest level of function for planned discharge setting.  See evaluation for individualized goals.  Description: Treatment/Interventions: Functional transfer training, LE strengthening/ROM, Elevations, Therapeutic exercise, Endurance training, Patient/family training, Bed mobility, Equipment eval/education, Gait training, Spoke to nursing, Spoke to case management, OT, Family  Equipment Recommended:  (has RW PTA; would benefit from commode)       See flowsheet documentation for full assessment, interventions and recommendations.  Outcome: Progressing  Note: Prognosis: Good  Problem List: Decreased endurance, Impaired balance, Decreased mobility, Orthopedic restrictions, Pain, Decreased range of motion  Assessment: Pt is a 19 y.o. admitted to [facility] on 4/29/2025 for elective spinal fusion due to scheuermann's dz. S/p T3-L2 PSF.  Pt has the following comorbidities which affect their treatment: active problems listed above,  has a past medical history of Anxiety, Asthma, Depression, and Eczema.  , as well as personal factors including stairs to access home. Pt has a high complexity clinical presentation due to Ongoing medical management for primary dx, Increased reliance on more restrictive AD compared to baseline, Decreased activity tolerance compared to baseline, Fall risk, Increased assistance needed from caregiver at current time, Ongoing telemetry monitoring, Trending lab values, Spinal precautions at current time, Diagnostic imaging pending, Continuous pulse oximetry monitoring , JACKELYN drain in place at current time, s/p surgical intervention , and PMH. PT was consulted to evaluate pt's functional mobility and discharge needs. Upon evaluation, patient required minAx1 for all mobility as outlined above. VC for paced breathing due to pain and lightheadness-BP stable throughout. Body system impairments include impaired ROM, balance, endurance, +pain. Pt's  functional activity impairments include: activity tolerance and mobility. Participation restrictions include inability to return to work or community based activities at this time. At conclusion of eval, pt remained seated in chair with phone, call bell, and all other personal needs within reach. Aware to not mobilize without staff. Pt would benefit from skilled PT to address their functional mobility limitations. The patient's AM-PAC Basic Mobility Inpatient Short Form Raw Score is 18. A Raw score of greater than 16 suggests the patient may benefit from discharge to home. Please also refer to the recommendation of the Physical Therapist for safe discharge planning.  Barriers to Discharge: Inaccessible home environment     Rehab Resource Intensity Level, PT: No post-acute rehabilitation needs (home pending stair negotiation-OPPT as needed after OP follow up with Dr. Padilla)    See flowsheet documentation for full assessment.

## 2025-04-30 NOTE — ASSESSMENT & PLAN NOTE
18 y/o POD  PSF T3-L2 with apical jw osteotomies for Scheurmann's disease. Doing well post op.   WBAT   Ancef 48 hrs  Scoli protocol  Multimodal pain control   PT/OT  Dispo: pending

## 2025-04-30 NOTE — UTILIZATION REVIEW
Initial Clinical Review    Elective Inpatient surgical procedure  Age/Sex: 19 y.o. adult  Surgery Date: 04-29-25  Procedure: T3-L2 posterior spinal fusion/arthrodesis             7-12 levels: 06683  with instrumentation                                                7-12 levels: 21136  jw osteotomies T8-T9, T9-T10, T10-T11, T11-T12,      20213, 62011 (x3)  autograft                                                                    20936  allograft                                                                      20930  3D computer-assisted navigation                           92700     22 modifier - The work required for today's procedure was substantially more than typically required given the patient's dysplastic pedicles and high BMI (46 kg/m2).  Anesthesia: General  Operative Findings: No IONM changes     POD#1 Progress Note: Dilaudid PCA - titrate neuro checks q 2 hr x 8 hrs then q 4 hrs a-line, IS q 2 hrs WA, IV Ancef x 48 hrs continuous cardio-pulmonary & pulse oximetry, PT/OT    19 y.o.adult doing well. No acute events, no new complaints. Pain well controlled. Denies fevers, chills, CP, SOB, N/V, numbness or tingling.     Admission Orders: Date/Time/Statement:   Admission Orders (From admission, onward)       Ordered        04/30/25 0806  INPATIENT ADMISSION  Once                          Orders Placed This Encounter   Procedures    INPATIENT ADMISSION     Standing Status:   Standing     Number of Occurrences:   1     Level of Care:   Critical Care [15]     Bed Type:   Pediatric [3]     Estimated length of stay:   More than 2 Midnights     Certification:   I certify that inpatient services are medically necessary for this patient for a duration of greater than two midnights. See H&P and MD Progress Notes for additional information about the patient's course of treatment.     Diet: diet as tolerate  Mobility: ambulate with PT  DVT Prophylaxis: SCD    Medications/Pain Control:   Scheduled  Medications:  acetaminophen, 1,000 mg, Intravenous, Q6H MOIRA  cefazolin, 2,000 mg, Intravenous, Q8H  diazepam, 5 mg, Oral, Q6H  docusate sodium, 50 mg, Oral, BID  famotidine, 20 mg, Oral, BID  gabapentin, 300 mg, Oral, Daily  ketorolac, 15 mg, Intravenous, Q6H  polyethylene glycol, 17 g, Oral, Daily      Continuous IV Infusions:  lactated ringers, 100 mL/hr, Intravenous, Continuous  HYDROmorphone (DILAUDID) 1 mg/mL 50 mL PCA     PRN Meds:  ondansetron, 4 mg, Intravenous, Q8H PRN  senna, 2 tablet, Oral, HS PRN      Vital Signs (last 3 days)       Date/Time Temp Pulse Resp BP MAP (mmHg) Arterial Line BP MAP SpO2 Calculated FIO2 (%) - Nasal Cannula Nasal Cannula O2 Flow Rate (L/min) O2 Device Patient Position - Orthostatic VS Jer Coma Scale Score Pain    04/30/25 1000 -- 81 17 129/64 91 -- -- 99 % -- -- -- -- -- --    04/30/25 0940 99 °F (37.2 °C) 81 22 -- -- 131/73 95 mmHg 99 % -- -- -- -- -- 6 04/30/25 0900 99 °F (37.2 °C) 80 25 133/62 89 150/67 89 mmHg 99 % -- -- -- -- -- --    04/30/25 0827 -- -- -- -- -- -- -- -- -- -- -- -- -- 4    04/30/25 0806 -- -- -- -- -- -- -- -- 28 2 L/min EtCO2 nasal cannula -- 15 --    04/30/25 0800 99 °F (37.2 °C) 78 20 -- -- 110/69 86 mmHg 100 % 28 2 L/min EtCO2 nasal cannula -- 15 --    04/30/25 0703 98.8 °F (37.1 °C) 82 22 119/83 97 114/80 97 mmHg 99 % -- -- -- -- -- 6    04/30/25 0700 99 °F (37.2 °C) 83 19 -- -- 102/76 89 mmHg 99 % -- -- -- -- -- --    04/30/25 0600 99 °F (37.2 °C) 82 25 120/76 95 145/68 90 mmHg 100 % 28 2 L/min EtCO2 nasal cannula -- -- --    04/30/25 0500 99.3 °F (37.4 °C) 80 22 125/63 88 114/68 86 mmHg 99 % 28 2 L/min EtCO2 nasal cannula -- -- --    04/30/25 0400 99.1 °F (37.3 °C) 79 24 127/75 96 133/73 95 mmHg 99 % 28 2 L/min EtCO2 nasal cannula -- 15 7    04/30/25 0300 99.1 °F (37.3 °C) 81 22 131/73 95 140/71 92 mmHg 99 % -- -- -- -- -- --    04/30/25 0206 99.3 °F (37.4 °C) 87 23 142/77 101 140/72 95 mmHg 99 % -- -- -- -- -- 7    04/30/25 0200 99.3 °F  (37.4 °C) 88 27 142/77 101 138/73 95 mmHg 99 % -- -- -- -- 15 --    04/30/25 0100 99.3 °F (37.4 °C) 86 24 131/79 98 102/83 93 mmHg 99 % 28 2 L/min EtCO2 nasal cannula -- -- --    04/30/25 0000 99.5 °F (37.5 °C) 91 26 140/78 100 111/92 103 mmHg 99 % 28 2 L/min EtCO2 nasal cannula -- 15 9    04/29/25 2300 99.5 °F (37.5 °C) 97 20 137/78 102 118/88 103 mmHg 100 % 28 2 L/min EtCO2 nasal cannula -- -- --    04/29/25 2200 99.7 °F (37.6 °C) 107 18 157/89 114 112/101 107 mmHg 99 % 28 2 L/min EtCO2 nasal cannula -- 15 8    04/29/25 2130 99.9 °F (37.7 °C) 107 18 154/73 106 111/94 103 mmHg 99 % -- -- -- -- -- --    04/29/25 2115 99.7 °F (37.6 °C) 115 35 161/81 110 119/108 114 mmHg 98 % -- -- -- -- -- --    04/29/25 2100 99.5 °F (37.5 °C) 115 29 156/81 110 125/106 117 mmHg 98 % 28 2 L/min EtCO2 nasal cannula -- 15 8 04/29/25 2045 99.5 °F (37.5 °C) 122 38 162/91 116 146/89 111 mmHg 98 % -- -- -- -- -- --    04/29/25 2035 99.5 °F (37.5 °C) 120 37 149/88 111 172/88 111 mmHg 97 % -- -- -- -- -- 7    04/29/25 2030 99.5 °F (37.5 °C) 123 35 -- -- 168/88 112 mmHg 97 % -- -- -- -- -- --    04/29/25 2015 99.5 °F (37.5 °C) 114 38 144/85 109 155/86 108 mmHg 96 % -- -- -- -- -- --    04/29/25 2000 99.5 °F (37.5 °C) 117 9 144/89 109 148/86 108 mmHg 96 % 28 2 L/min EtCO2 nasal cannula -- 15 10 - Worst Possible Pain    04/29/25 1945 -- 129 16 141/85 103 131/99 113 mmHg 95 % -- -- -- -- -- --    04/29/25 1937 99.3 °F (37.4 °C) 120 12 148/95 113 127/119 123 mmHg 93 % 28 2 L/min EtCO2 nasal cannula Lying -- 10 - Worst Possible Pain    04/29/25 0625 -- -- -- -- -- -- -- -- -- -- -- -- -- 5    04/29/25 0619 96 °F (35.6 °C) 87 18 138/80 -- -- -- 97 % -- -- None (Room air) -- -- --          Weight (last 2 days)       Date/Time Weight    04/29/25 1937 131 (288.14)    04/29/25 0619 122 (268)            Pertinent Labs/Diagnostic Test Results:   Radiology:  XR spine thoracolumbar 2 vw   Final Interpretation by Ermelinda Stovall MD (04/30 0818)  "  Postsurgical changes from the posterior fusion extending from T3-L2 with preserved alignment   Intact hardware      Workstation performed: [id number]         XR spine thoracolumbar 2 vw    (Results Pending)   XR spine entire ap and lateral    (Results Pending)     Cardiology:  ECG 12 lead   Final Result by Lloyd Benoit MD (04/29 0955)   Normal sinus rhythm   Normal ECG   When compared with ECG of 29-Apr-2025 06:28, (unconfirmed)   No significant change was found   Confirmed by Lloyd Benoit ([id number]) on 4/29/2025 9:54:58 AM      ECG 12 lead    by Interface, Ris Results In (04/29 0955)        GI:  No orders to display           Results from last 7 days   Lab Units 04/30/25 0523 04/29/25 2047 04/29/25 1755 04/29/25  1354 04/29/25  0856   WBC Thousand/uL 14.20* 22.52*  --   --   --    HEMOGLOBIN g/dL 9.9* 11.1*  --   --   --    I STAT HEMOGLOBIN g/dl  --   --  11.2* 11.6* 12.6   HEMATOCRIT % 29.4* 35.1*  --   --   --    HEMATOCRIT, ISTAT %  --   --  33* 34* 37   PLATELETS Thousands/uL 252 316  --   --   --    TOTAL NEUT ABS Thousands/µL 11.03* 20.29*  --   --   --          Results from last 7 days   Lab Units 04/30/25 0523 04/29/25 1755 04/29/25  1354 04/29/25  0856   SODIUM mmol/L 140  --   --   --    POTASSIUM mmol/L 3.5  --   --   --    CHLORIDE mmol/L 109*  --   --   --    CO2 mmol/L 24  --   --   --    CO2, I-STAT mmol/L  --  19* 21 24   ANION GAP mmol/L 7  --   --   --    BUN mg/dL 4*  --   --   --    CREATININE mg/dL 0.53*  --   --   --    CALCIUM mg/dL 8.0*  --   --   --    CALCIUM, IONIZED, ISTAT mmol/L  --  1.10* 1.09* 1.17     Results from last 7 days   Lab Units 04/30/25 0523   AST U/L 28   ALT U/L 17   ALK PHOS U/L 48   TOTAL PROTEIN g/dL 5.5*   ALBUMIN g/dL 3.6   TOTAL BILIRUBIN mg/dL 0.45         Results from last 7 days   Lab Units 04/30/25  0523   GLUCOSE RANDOM mg/dL 115             No results found for: \"BETA-HYDROXYBUTYRATE\"           Results from last 7 days   Lab Units " 04/29/25  1755 04/29/25  1354 04/29/25  0856   I STAT BASE EXC mmol/L -8* -7* -4*   I STAT O2 SAT % 99* 100* 99*   ISTAT PH ART  7.264* 7.268* 7.330*   I STAT ART PCO2 mm HG 39.8 42.8 42.3   I STAT ART PO2 mm .0* 220.0* 174.0*   I STAT ART HCO3 mmol/L 18.0* 19.5* 22.3                                                     Results from last 7 days   Lab Units 04/30/25  0826   UNIT PRODUCT CODE  [id number]  [id number]   UNIT NUMBER  [id number]  [id number]   UNITABO  A  A   UNITRH  POS  POS   CROSSMATCH  Compatible  Compatible   UNIT DISPENSE STATUS  Return to Inv  Return to Inv   UNIT PRODUCT VOL ml 300  300                                                                           Network Utilization Review Department  ATTENTION: Please call with any questions or concerns to [phone] and carefully listen to the prompts so that you are directed to the right person. All voicemails are confidential.   For Discharge needs, contact Care Management DC Support Team at [phone] opt. 2  Send all requests for admission clinical reviews, approved or denied determinations and any other requests to dedicated fax number below belonging to the campus where the patient is receiving treatment. List of dedicated fax numbers for the Facilities:  FACILITY NAME UR FAX NUMBER   ADMISSION DENIALS (Administrative/Medical Necessity) [phone]   DISCHARGE SUPPORT TEAM (NETWORK) [phone]   PARENT CHILD HEALTH (Maternity/NICU/Pediatrics) [phone]   [facility] [phone]   [facility] [phone]   [facility] [phone]   [facility] [phone]   [facility] [phone]   [facility] [phone]   [facility] [phone]   [facility] [phone]   [facility]  [phone]   [facility] [phone]   [facility] [phone]   [facility] [phone]

## 2025-05-01 PROCEDURE — 99232 SBSQ HOSP IP/OBS MODERATE 35: CPT | Performed by: STUDENT IN AN ORGANIZED HEALTH CARE EDUCATION/TRAINING PROGRAM

## 2025-05-01 PROCEDURE — 97112 NEUROMUSCULAR REEDUCATION: CPT

## 2025-05-01 PROCEDURE — 97116 GAIT TRAINING THERAPY: CPT

## 2025-05-01 PROCEDURE — 99024 POSTOP FOLLOW-UP VISIT: CPT | Performed by: ORTHOPAEDIC SURGERY

## 2025-05-01 PROCEDURE — 97530 THERAPEUTIC ACTIVITIES: CPT

## 2025-05-01 RX ORDER — OXYCODONE HYDROCHLORIDE 5 MG/1
5 TABLET ORAL EVERY 6 HOURS PRN
Qty: 45 TABLET | Refills: 0 | Status: SHIPPED | OUTPATIENT
Start: 2025-05-01 | End: 2025-05-12

## 2025-05-01 RX ORDER — ACETAMINOPHEN 325 MG/1
975 TABLET ORAL EVERY 6 HOURS SCHEDULED
Status: DISCONTINUED | OUTPATIENT
Start: 2025-05-01 | End: 2025-05-02 | Stop reason: HOSPADM

## 2025-05-01 RX ORDER — IBUPROFEN 600 MG/1
600 TABLET, FILM COATED ORAL EVERY 6 HOURS SCHEDULED
Status: DISCONTINUED | OUTPATIENT
Start: 2025-05-01 | End: 2025-05-02 | Stop reason: HOSPADM

## 2025-05-01 RX ORDER — DIAZEPAM 2 MG/1
2 TABLET ORAL
Qty: 14 TABLET | Refills: 0 | Status: SHIPPED | OUTPATIENT
Start: 2025-05-01 | End: 2025-05-15

## 2025-05-01 RX ADMIN — KETOROLAC TROMETHAMINE 15 MG: 30 INJECTION, SOLUTION INTRAMUSCULAR; INTRAVENOUS at 08:31

## 2025-05-01 RX ADMIN — DIAZEPAM 5 MG: 5 TABLET ORAL at 14:21

## 2025-05-01 RX ADMIN — IBUPROFEN 600 MG: 600 TABLET, FILM COATED ORAL at 20:49

## 2025-05-01 RX ADMIN — CEFAZOLIN SODIUM 2000 MG: 2 SOLUTION INTRAVENOUS at 09:03

## 2025-05-01 RX ADMIN — OXYCODONE HYDROCHLORIDE 5 MG: 5 TABLET ORAL at 13:25

## 2025-05-01 RX ADMIN — FAMOTIDINE 20 MG: 20 TABLET, FILM COATED ORAL at 17:56

## 2025-05-01 RX ADMIN — KETOROLAC TROMETHAMINE 15 MG: 30 INJECTION, SOLUTION INTRAMUSCULAR; INTRAVENOUS at 01:32

## 2025-05-01 RX ADMIN — DOCUSATE SODIUM 50 MG: 50 CAPSULE, LIQUID FILLED ORAL at 17:55

## 2025-05-01 RX ADMIN — CEFAZOLIN SODIUM 2000 MG: 2 SOLUTION INTRAVENOUS at 01:32

## 2025-05-01 RX ADMIN — DIAZEPAM 5 MG: 5 TABLET ORAL at 20:49

## 2025-05-01 RX ADMIN — Medication 2000 UNITS: at 09:03

## 2025-05-01 RX ADMIN — POLYETHYLENE GLYCOL 3350 17 G: 17 POWDER, FOR SOLUTION ORAL at 09:03

## 2025-05-01 RX ADMIN — ACETAMINOPHEN 975 MG: 325 TABLET, FILM COATED ORAL at 17:55

## 2025-05-01 RX ADMIN — CEFAZOLIN SODIUM 2000 MG: 2 SOLUTION INTRAVENOUS at 17:20

## 2025-05-01 RX ADMIN — ACETAMINOPHEN 1000 MG: 10 INJECTION INTRAVENOUS at 05:53

## 2025-05-01 RX ADMIN — IBUPROFEN 600 MG: 600 TABLET, FILM COATED ORAL at 14:21

## 2025-05-01 RX ADMIN — DIAZEPAM 5 MG: 5 TABLET ORAL at 01:32

## 2025-05-01 RX ADMIN — OXYCODONE HYDROCHLORIDE 5 MG: 5 TABLET ORAL at 05:01

## 2025-05-01 RX ADMIN — ACETAMINOPHEN 975 MG: 325 TABLET, FILM COATED ORAL at 12:16

## 2025-05-01 RX ADMIN — GABAPENTIN 300 MG: 300 CAPSULE ORAL at 09:03

## 2025-05-01 RX ADMIN — OXYCODONE HYDROCHLORIDE 5 MG: 5 TABLET ORAL at 19:28

## 2025-05-01 RX ADMIN — FAMOTIDINE 20 MG: 20 TABLET, FILM COATED ORAL at 09:04

## 2025-05-01 RX ADMIN — DIAZEPAM 5 MG: 5 TABLET ORAL at 08:31

## 2025-05-01 RX ADMIN — DOCUSATE SODIUM 50 MG: 50 CAPSULE, LIQUID FILLED ORAL at 09:03

## 2025-05-01 NOTE — CASE MANAGEMENT
Case Management Discharge Planning Note    Patient name Luciana Junior PICU 333/PICU 333-01 MRN [id number]  : 2005 Date 2025       Current Admission Date: 2025  Current Admission Diagnosis:S/P spinal surgery   Patient Active Problem List    Diagnosis Date Noted Date Diagnosed    Smoker 2025     Marijuana smoker 2025     S/P spinal surgery 2025     Asthma      Bipolar affective disorder, current episode mixed (HCC) 2023     Vitamin D insufficiency 2023     Hypercholesterolemia 2023     Binge eating disorder 2023     Obesity, Class III, BMI 40-49.9 (morbid obesity) 2023     Thoughts of self harm 2022     Generalized anxiety disorder 2022     Attention deficit disorder predominant inattentive type 2022       LOS (days): 1  Geometric Mean LOS (GMLOS) (days):   Days to GMLOS:     OBJECTIVE:            Current admission status: Inpatient   Preferred Pharmacy:   [facility] [address]  [address]  [facility]  [place] [address]  Phone: [phone] Fax: [phone]    [facility] [address] [address]  [address] [place] [address]  Phone: [phone] Fax: [phone]    [facility]  [place], [place] - [address] [address] [address]  [address] [address]  Phone: [phone] Fax: [phone]    Primary Care Provider: Bonita Cuellar MD    Primary Insurance: [employer]  Secondary Insurance:     DISCHARGE DETAILS:        Received ESC from PT re: rec for acute rehab  Met with patient at bedside to review. Patient reports she is only agreeable if she could go to the Pediatric Unit at [facility] and not the adult hospital in [place]. She states if she cannot be placed on the pediatric unit, she would like to be dc home, boyfriend and friend ( who resides 5 mins away) will provide assistance  Will need  commode at d/c, reports she has a walker  Message sent to Shanell Bhatt, [facility] Pediatric liaison re: above, will await reply

## 2025-05-01 NOTE — PHYSICAL THERAPY NOTE
"                                                                                  PHYSICAL THERAPY NOTE          Patient Name: Luciana Orozco  Today's Date: 5/1/2025 05/01/25 1146   PT Last Visit   PT Visit Date 05/01/25   Note Type   Note Type Treatment   Pain Assessment   Pain Assessment Tool 0-10   Pain Score 7   Pain Location/Orientation Location: Back   Pain Onset/Description Onset: Ongoing   Effect of Pain on Daily Activities limits functional mobility   Patient's Stated Pain Goal No pain   Hospital Pain Intervention(s) Repositioned;Ambulation/increased activity;Emotional support;Rest   Restrictions/Precautions   Weight Bearing Precautions Per Order Yes   RLE Weight Bearing Per Order WBAT   LLE Weight Bearing Per Order WBAT   Braces or Orthoses Other (Comment)  (hemovac drain)   Other Precautions Chair Alarm;Bed Alarm;Telemetry;Fall Risk;Pain;Spinal precautions   General   Chart Reviewed Yes   Response to Previous Treatment Patient reporting fatigue but able to participate.   Family/Caregiver Present No   Cognition   Overall Cognitive Status WFL   Arousal/Participation Responsive;Alert;Cooperative   Attention Within functional limits   Orientation Level Oriented X4   Memory Within functional limits   Following Commands Follows all commands and directions without difficulty   Comments patient very pleasant and cooperative, required re-education of spinal precautions, fair insight of functional deficits and safety awareness   Subjective   Subjective \"My back feels like it is going to rip open\"   Bed Mobility   Rolling R 4  Minimal assistance   Additional items Assist x 1;Bedrails;Increased time required;Verbal cues;LE management   Rolling L 4  Minimal assistance   Additional items Assist x 1;Increased time required;Verbal cues;LE management;Bedrails   Supine to Sit 4  Minimal assistance   Additional items Assist x 1;Increased time required;Verbal cues;LE management   Sit to Supine 4  Minimal assistance "   Additional items Assist x 1;HOB elevated;Bedrails;Increased time required;Verbal cues;LE management   Additional Comments patient found and left supine in bed with alarm and all needs met   Transfers   Sit to Stand 4  Minimal assistance   Additional items Assist x 1;Increased time required;Verbal cues   Stand to Sit 4  Minimal assistance   Additional items Assist x 1;Increased time required;Verbal cues   Toilet transfer 4  Minimal assistance   Additional items Assist x 1;Increased time required;Verbal cues  (vc for set up, use of grab bars, MaxA pericare and lower body dressing)   Additional Comments x4 STS with RW   Ambulation/Elevation   Gait pattern Forward Flexion;Wide TENZIN;Decreased foot clearance;Inconsistent latricia;Short stride;Step to;Excessively slow;Decreased heel strike   Gait Assistance 4  Minimal assist   Additional items Assist x 1;Verbal cues;Tactile cues   Assistive Device Rolling walker   Distance 20'x2 + 70'x2   Stair Management Assistance Not tested  (trial as able; 2SH)   Ambulation/Elevation Additional Comments Patient demonstrated the ability to ambulate 20'x2 + 70'x2 with RW, requiring intermittent verbal cues for RW management and hallway navigation to improve patient safety and reduce risk of falls. Patient required intermittent standing rest breaks due to back pain, dizziness/lightheadedness, generalized LE weakness, and decreased activity tolerance.  (ambulates with excessively slowed gait speed, requiring frequent rest breaks due to pain)   Balance   Static Sitting Fair   Dynamic Sitting Fair -   Static Standing Poor +   Dynamic Standing Poor +   Ambulatory Poor +   Endurance Deficit   Endurance Deficit Yes   Endurance Deficit Description generalized weakness, fatigue, pain, dizziness/lightheadedness   Activity Tolerance   Activity Tolerance Patient limited by fatigue;Patient limited by pain   Medical Staff Made Aware spoke to MD and CM via Headroom secure chat   Nurse Made Aware RN cleared  and updated   Exercises   Neuro re-ed Patient maintained static/dynamic sitting balance ~15 mins, with use of b/l UE for support and balance during dynamic activities.   Assessment   Prognosis Good   Problem List Decreased endurance;Impaired balance;Decreased mobility;Orthopedic restrictions;Pain;Decreased range of motion   Assessment PT initiated treatment session in order to assist patient in achieving goals to improve transfers, gait training, and overall activity tolerance. Patient demonstrated progress toward achieving functional mobility goals as evidenced by improving activity tolerance, ambulation, and overall mobility. Patient pleasant, cooperative, and agreeable to today's treatment session. Patient received supine in bed. Patient is currently MinAx1 bed mobility, transfers, and ambulation. Patient re-education on spinal precautions and safe bed mobility. Throughout treatment session patient required both verbal and tactile cuing to improve safety, efficiency, and mechanics of mobility in addition to hands on assistance for all aspects of functional mobility. Additionally, pt required increased time to execute specific mobility tasks with rest breaks in between secondary to gross fatigue and weakness. Patient maintained static/dynamic sitting balance ~15 mins, with use of b/l UE for support and balance during dynamic activities. Patient noted dizziness/lightheadedness following supine to sit, required seated rest break, resolved prior to functional mobility and ambulation. Performed toilet transfer from standard toilet, requiring MaxA for pericare and lower body dressing. Patient demonstrated the ability to ambulate 20'x2 + 70'x2 with RW, requiring intermittent verbal cues for RW management and hallway navigation to improve patient safety and reduce risk of falls. Patient required intermittent standing rest breaks due to back pain, dizziness/lightheadedness, generalized LE weakness, and decreased activity  tolerance. Patient left supine in bed with alarm and all needs met.       Patient will benefit from continued skilled physical therapy to address gait / balance dysfunction, decreased activity tolerance, and generalized weakness. The patients AM-PAC Basic Mobility Inpatient Short From Raw Score is 16 .  Based on AM-PAC scoring and patient presentation, PT currently recommending Level I (Maximum Resource Intensity). Please also refer to the recommendation of the Physical Therapist for safe discharge planning.   Barriers to Discharge Inaccessible home environment;Decreased caregiver support   Goals   Patient Goals to reduce pain   STG Expiration Date 05/14/25   PT Treatment Day 1   Plan   Treatment/Interventions ADL retraining;Functional transfer training;LE strengthening/ROM;Elevations;Therapeutic exercise;Endurance training;Patient/family training;Bed mobility;Gait training;Spoke to nursing;Spoke to MD   Progress Progressing toward goals   PT Frequency Other (Comment)  (Daily)   Discharge Recommendation   Rehab Resource Intensity Level, PT (S)  I (Maximum Resource Intensity)  (switched; level 1 vs 3 pending further progress and stair negotiation. decreased caregiver support and inaccessible home environment. patient expressed fear with rehab placement at this time (may decline))   Equipment Recommended Walker  (has RW; would benefit from commode)   Walker Package Recommended Wheeled walker   Change/add to Walker Package? No   AM-PAC Basic Mobility Inpatient   Turning in Flat Bed Without Bedrails 3   Lying on Back to Sitting on Edge of Flat Bed Without Bedrails 3   Moving Bed to Chair 3   Standing Up From Chair Using Arms 3   Walk in Room 2   Climb 3-5 Stairs With Railing 2   Basic Mobility Inpatient Raw Score 16   Basic Mobility Standardized Score 38.32   [facility] Highest Level Of Mobility   -HLM Goal 5: Stand one or more mins   -HLM Achieved 7: Walk 25 feet or more   Education   Education Provided Mobility  training;Assistive device  (spinal precautions)   Patient Demonstrates acceptance/verbal understanding   End of Consult   Patient Position at End of Consult Supine;Bed/Chair alarm activated;All needs within reach     Raegan Bird PT, DPT

## 2025-05-01 NOTE — PROGRESS NOTES
Progress Note - Pediatric Intensive Care   Name: Luciana Orozco 19 y.o. adult I MRN: [id number]  Unit/Bed#: PICU 333-01 I Date of Admission: 2025   Date of Service: 2025 I Hospital Day: 1       Assessment & Plan   Luciana Orozco is a 20 y/o female with history of asthma, bipolar d/o, ADHD, obesity, and Scheuermann's disease, now POD#2 s/p T3-L2 posterior spinal fusion. Overall uncomplicated surgical case - no neuromonitoring concerns or excessive blood loss by report. Admitted postoperatively for neurovascular and hemodynamic monitoring; PICU level of care is warranted.      Neuro:   - ongoing monitoring  - transition to enteral Tylenol and Motrin  - Continue valium 5mg PO Q6  - Continue oxycodone PRN  - Continue gabapentin     CV:   - continuous monitoring     Resp:   - continuous SpO2 monitoring in room air  - incentive spirometry     FEN/GI:   - regular diet  - continue famotidine  - zofran PRN  - continue bowel regimen     :   - strict I's/O's     ID:   - continue Ancef x48hr rosario-op     Heme:   - Hgb 9.9 on , recheck as needed     Endo:   - no active issues                Msk/Skin:   - PT/OT  - appreciate ortho input     Disposition: PICU     Patient updated on plan at bedside.    24 Hour Events : No acute events, able to get up with PT yesterday. Pain reported at 4/10 on awakening this AM    Objective :  Temp:  [98.1 °F (36.7 °C)-99 °F (37.2 °C)] 98.1 °F (36.7 °C)  HR:  [] 95  BP: (103-135)/(54-73) 135/73  Resp:  [17-29] 20  SpO2:  [91 %-97 %] 97 %  O2 Device: None (Room air)    Arterial Line BP: 131/73  Arterial Line MAP (mmHg): 95 mmHg    Temperature:   Temp (24hrs), Av.5 °F (36.9 °C), Min:98.1 °F (36.7 °C), Max:99 °F (37.2 °C)    Current: Temperature: 98.1 °F (36.7 °C)    Weights:   IBW (Ideal Body Weight): 59.3 kg    Body mass index is 46.51 kg/m².  Weight (last 2 days)       Date/Time Weight    25 --    Comment rows:    OBSERV: pt. sleeping at 25     04/29/25 1937 131 (288.14)    04/29/25 0619 122 (268)          Physical Exam  Constitutional:       General: She is not in acute distress.     Appearance: She is obese.   HENT:      Head: Normocephalic and atraumatic.      Mouth/Throat:      Mouth: Mucous membranes are moist.   Eyes:      Conjunctiva/sclera: Conjunctivae normal.   Cardiovascular:      Rate and Rhythm: Normal rate and regular rhythm.      Pulses: Normal pulses.      Heart sounds: Normal heart sounds. No murmur heard.  Pulmonary:      Effort: Pulmonary effort is normal. No respiratory distress.      Breath sounds: Normal breath sounds. No wheezing or rales.   Abdominal:      General: There is no distension.      Palpations: Abdomen is soft.      Tenderness: There is no abdominal tenderness.   Musculoskeletal:         General: No swelling.      Cervical back: Normal range of motion and neck supple.      Right lower leg: No edema.      Left lower leg: No edema.      Comments: Drain in place; spinal dressing c/d/i   Skin:     General: Skin is warm and dry.      Capillary Refill: Capillary refill takes less than 2 seconds.   Neurological:      General: No focal deficit present.      Mental Status: She is alert and oriented to person, place, and time.     Medications:   Scheduled Meds:  Current Facility-Administered Medications   Medication Dose Route Frequency Provider Last Rate    acetaminophen  975 mg Oral Q6H MOIRA Pereira MD      cefazolin  2,000 mg Intravenous Q8H Papo Bourne DO 2,000 mg (05/01/25 0903)    cholecalciferol  2,000 Units Oral Daily Joseph Padilla MD      diazepam  5 mg Oral Q6H Christina J Palladino, MD      docusate sodium  50 mg Oral BID Christina J Palladino, MD      famotidine  20 mg Oral BID Christina J Palladino, MD      gabapentin  300 mg Oral Daily Christina J Palladino, MD      ibuprofen  600 mg Oral Q6H MOIRA Pereira MD      ondansetron  4 mg Intravenous Q8H PRN Gabriel Mcclain MD       oxyCODONE  5 mg Oral Q4H PRN Christina J Palladino, MD      polyethylene glycol  17 g Oral Daily Christina J Palladino, MD      senna  2 tablet Oral HS PRN Christina J Palladino, MD       Continuous Infusions:   PRN Meds:  ondansetron, 4 mg, Q8H PRN  oxyCODONE, 5 mg, Q4H PRN  senna, 2 tablet, HS PRN      Invasive lines and devices:  Invasive Devices       Peripheral Intravenous Line  Duration             Peripheral IV 04/29/25 Left Hand 2 days    Peripheral IV 04/29/25 Right Hand 2 days              Drain  Duration             Closed/Suction Drain Inferior;Right Back Accordion 10 Fr. 1 day                  Non-Invasive/Invasive Ventilation Settings:  Respiratory      Lab Data (Last 4 hours)      None           O2/Vent Data (Last 4 hours)      None                  SpO2: SpO2: 97 %    Intake and Outputs:  I/O         04/29 0701 04/30 0700 04/30 0701  05/01 0700 05/01 0701  05/02 0700    P.O.  1020     I.V. (mL/kg) 8121.67 (62) 606 (4.63)     IV Piggyback 1774.56 200     Cell Saver 273      Total Intake(mL/kg) 83903.23 (77.63) 1826 (13.94)     Urine (mL/kg/hr) 4700 (1.49) 550 (0.17)     Emesis/NG output 0      Drains 100 340     Blood 675      Total Output 5475 890     Net +4694.23 +936            Unmeasured Urine Occurrence  5 x 1 x    Unmeasured Emesis Occurrence 1 x                  Lab Results: I have reviewed the following results:  Results from last 7 days   Lab Units 04/30/25 0523 04/29/25  2047 04/29/25  1755   WBC Thousand/uL 14.20* 22.52*  --    HEMOGLOBIN g/dL 9.9* 11.1*  --    I STAT HEMOGLOBIN g/dl  --   --  11.2*   HEMATOCRIT % 29.4* 35.1*  --    HEMATOCRIT, ISTAT %  --   --  33*   PLATELETS Thousands/uL 252 316  --    SEGS PCT % 78* 91*  --    MONO PCT % 11 5  --    EOS PCT % 0 0  --       Results from last 7 days   Lab Units 04/30/25  0523 04/29/25  1755 04/29/25  1354 04/29/25  0856   SODIUM mmol/L 140  --   --   --    POTASSIUM mmol/L 3.5  --   --   --    CHLORIDE mmol/L 109*  --   --   --    CO2  "mmol/L 24  --   --   --    CO2, I-STAT mmol/L  --  19* 21 24   BUN mg/dL 4*  --   --   --    CREATININE mg/dL 0.53*  --   --   --    CALCIUM mg/dL 8.0*  --   --   --    ALBUMIN g/dL 3.6  --   --   --    ALK PHOS U/L 48  --   --   --    ALT U/L 17  --   --   --    AST U/L 28  --   --   --    GLUCOSE, ISTAT mg/dl  --  100 110 113                 No results found for: \"PHART\", \"SZD7UTV\", \"PO2ART\", \"UQM4OPQ\", \"A0LQIAGY\", \"BEART\", \"SOURCE\"    Micro:  No results found for: \"BLOODCX\", \"URINECX\", \"WOUNDCULT\", \"SPUTUMCULTUR\"    Imaging Results Review: No pertinent imaging studies reviewed.  Other Study Results Review: No additional pertinent studies reviewed.    Code Status: Prior    Critical Care Time Statement: Upon my evaluation, this patient had a high probability of imminent or life-threatening deterioration due to s/p posterior spinal fusion, which required my direct attention, intervention, and personal management.  I spent a total of 35 minutes directly providing critical care services, including evaluating for the presence of life-threatening injuries or illnesses. This time is exclusive of procedures, teaching, family meetings, and any prior time recorded by providers other than myself.      Jitendra Pereira MD  Pediatric Critical Care  "

## 2025-05-01 NOTE — PLAN OF CARE
Problem: PHYSICAL THERAPY ADULT  Goal: Performs mobility at highest level of function for planned discharge setting.  See evaluation for individualized goals.  Description: Treatment/Interventions: Functional transfer training, LE strengthening/ROM, Elevations, Therapeutic exercise, Endurance training, Patient/family training, Bed mobility, Equipment eval/education, Gait training, Spoke to nursing, Spoke to case management, OT, Family  Equipment Recommended:  (has RW PTA; would benefit from commode)       See flowsheet documentation for full assessment, interventions and recommendations.  Outcome: Progressing  Note: Prognosis: Good  Problem List: Decreased endurance, Impaired balance, Decreased mobility, Orthopedic restrictions, Pain, Decreased range of motion  Assessment: PT initiated treatment session in order to assist patient in achieving goals to improve transfers, gait training, and overall activity tolerance. Patient demonstrated progress toward achieving functional mobility goals as evidenced by improving activity tolerance, ambulation, and overall mobility. Patient pleasant, cooperative, and agreeable to today's treatment session. Patient received supine in bed. Patient is currently MinAx1 bed mobility, transfers, and ambulation. Patient re-education on spinal precautions and safe bed mobility. Throughout treatment session patient required both verbal and tactile cuing to improve safety, efficiency, and mechanics of mobility in addition to hands on assistance for all aspects of functional mobility. Additionally, pt required increased time to execute specific mobility tasks with rest breaks in between secondary to gross fatigue and weakness. Patient maintained static/dynamic sitting balance ~15 mins, with use of b/l UE for support and balance during dynamic activities. Patient noted dizziness/lightheadedness following supine to sit, required seated rest break, resolved prior to functional mobility and  ambulation. Performed toilet transfer from standard toilet, requiring MaxA for pericare and lower body dressing. Patient demonstrated the ability to ambulate 20'x2 + 70'x2 with RW, requiring intermittent verbal cues for RW management and hallway navigation to improve patient safety and reduce risk of falls. Patient required intermittent standing rest breaks due to back pain, dizziness/lightheadedness, generalized LE weakness, and decreased activity tolerance. Patient left supine in bed with alarm and all needs met. Patient will benefit from continued skilled physical therapy to address gait / balance dysfunction, decreased activity tolerance, and generalized weakness. The patients AM-PAC Basic Mobility Inpatient Short From Raw Score is 16 .  Based on AM-PAC scoring and patient presentation, PT currently recommending Level I (Maximum Resource Intensity). Please also refer to the recommendation of the Physical Therapist for safe discharge planning.  Barriers to Discharge: Inaccessible home environment, Decreased caregiver support     Rehab Resource Intensity Level, PT: (S) I (Maximum Resource Intensity) (switched; level 1 vs 3 pending further progress and stair negotiation. decreased caregiver support and inaccessible home environment. patient expressed fear with rehab placement at this time (may decline))    See flowsheet documentation for full assessment.

## 2025-05-01 NOTE — ASSESSMENT & PLAN NOTE
18 y/o POD  PSF T3-L2 with apical jw osteotomies for Scheurmann's disease. Doing well overall post op.     WBAT B/L LE  Will need scoliosis films prior to discharge  Multimodal pain control   PT/OT  Will monitor for ABLA and administer IVF/prbc as indicated for Greater than 2 gram drop or Hgb < 7  Medical management per PICU team, recommendations appreciated  Dispo planning

## 2025-05-01 NOTE — CASE MANAGEMENT
Per pediatric [facility], unsure if can accommodate due to patient age but will talk with Dr Rob and [place] back with CM

## 2025-05-01 NOTE — DISCHARGE INSTR - AVS FIRST PAGE
School Note:    The patient listed above is under my care.  They are being discharged from the hospital after undergoing surgery for scoliosis.  They should be excused from school for 4-6 weeks following surgery.  If the parents request, they should be provided accommodations for virtual schooling. Keep in mind they will not be able to complete large assignments for at least 2-3 weeks after surgery.     Per parent discretion, they may return to school as early as 4 weeks postoperatively with the following accommodations between postoperative weeks 4-6:  Option for a shortened school day and/or half-day between 4-6 weeks after surgery.  Allow breaks from sitting to stand / stretch in the back of the room.  Elevator access (if one is present at the school).  Arrange for child to leave class 5-10 minutes early and transition safely.  Provide two sets of books (one for school and one for home).  Light backpack. (5-10-pound weight limit)    The child should be excused from physical education for 3 months after surgery.  The child should be excused from sports until cleared by their surgeon.    Post-Operative Clinic Visits:    If not already scheduled, call the orthopedic clinic to schedule your postoperative appointment for 2 weeks after surgery.  Postoperative visits are usually 2 weeks, 6 weeks, 3 months, and 6 months after surgery - then yearly.  At each visit starting at 6 weeks, you will have x-rays taken of your back to check healing and the position of your hardware. If possible, schedule a morning visit to avoid longer X-ray wait times.    Dressings / Incision:    Can shower 7 days after surgery. Do not submerge the incision in tub water. Remove any non-water proof bandage not supplied by [facility] prior to the shower. If the bandage is water proof you may leave it on and remove after shower, pat skin dry, then reapply new dressing.    Leave Steri-Strips on.  Do not remove.  Steri-Strips should fall off  on their own.    May need chair in shower to sit on for the first shower. Do not sit or stand with the shower head spraying directly onto the incision. The heat from the water may cause the dissolvable stitches under the skin to melt too soon.   Do not scrub the incision. Let soapy water run down then rinse with water. Gently pat dry.    There will be a small dressing/scab to the right of your incision. This is where the drainage tube was removed. You can cover this with a bandage or a bandaid. If this scab comes off by either accidentally scratching it or after the shower there is a possibility of a gush of clear brownish to clear slightly tinged yellowish fluid that may come out of the drain site. It may continue to drain for 24-36 hours, if this occurs cover with 4x4 gauze that should be folded into a quarter size then cover with a small bandage.  The dressing may need to be changed several times a day if dressing becomes soiled. May remove once drainage stops. This does not always happen but you should be aware if it occurs.     Call the clinic nurse if you notice any drainage from your incision (white, yellow, or green), incision comes open, or you develop a fever over 100.1 degrees F.    Special Purple Dressing (Incisional VAC):  If you have the long purple dressing with the tube, remember to plug in and charge the small purse-size machine occasionally. The black charging cord plugs into the bottom of the machine.       This machine will automatically turn off 7 days after it was placed. For example, if you had the dressing placed during a Tuesday surgery, the machine will automatically turn off sometime the following Tuesday. There are lights on the power button that denote how many days are left. Green lights denote days and when the yellow light appears on the power button [place] it means hours.       When 7 days have passed and the machine shuts off you can remove the dressing, shower, then put on the  Mepilex dressings supplied to you when you left the hospital (they look like long waterproof bandaids). Please clean your hands before removing the patient's dressing. Remove the borders of the dressing carefully - it is VERY sticky! Remove the dressing from top to bottom. When you get the very top portion off the skin, use finger pressure on the skin just above the sticky tape on each side to ease removal. Call the office if there are any concerns about the incision. If there are concerns, it also helps to take pictures of the incision and send them via [employer].          If the machine starts beeping or has issues while it's on, you may choose to troubleshoot based on the panel or just turn it off. You can leave the dressing with the tube on or remove the purple dressing and use the Mepilex dressings. Call the office if there are any questions or concerns about the incision.      General Instructions:    Some patients experience a temporary numb sensation on the outer thigh post operatively. This occurs from pressure on the femoral nerve from lying on the operating room table during surgery. When the femoral nerve recovers or “wakes up” it causes a tingling sensation, this is normal. The tingling sensation may take several weeks to resolve.     Walk regularly as tolerated, this will help with achiness and promote good gastrointestinal motility (bowel function) related to constipation.    Eat healthy.  Drink plenty of water often and include fiber in your diet to prevent constipation such as fruit, vegetables, salads, bran cereal or bran muffins are examples. Appetite will be decreased for several weeks after surgery. Five small meals are recommended. The patient may graze for the next several weeks, this is normal.  A supply of pain medication will be given for home use at the time of discharge from the hospital. Take your pain medicine as needed for pain as directed. May supplement ibuprofen and/or Tylenol every 6  hours as needed for pain. Take your medications with food to prevent an upset stomach.  Iron can help recover blood values. Foods rich in iron are red meat, egg yolks, dark  leafy greens (spinach, collards), dried fruit (prunes, raisins), iron-enriched cereals and grains (check the labels), mollusks (oysters, clams, scallops), turkey or chicken giblets, beans, lentils, chick peas and soybeans, liver, and artichokes to list a few. If you eat iron-rich foods along with foods that provide plenty of vitamin C (orange juice, oranges, and grapefruit), the body can better absorb the iron.  Allowed to ride in the car.  Allowed to climb stairs (with assistance if needed).  Allowed to lift up to 5 lbs. Example: gallon of milk is 9.5 pounds   Rest often. Listen to your back.  No bending and twisting at waist.  No housework.  No driving.    No sports (no P.E).    Please do not hesitate to call the clinic nurses if you have any questions or concerns when you are home recovering.    ACTIVITY 1 week 1 month 3 months 6 months  Shower Yes     Walking Yes     Swimming No Yes    Lifting 10-20 lbs. (book bag) No Yes    Light shoulder/arm exercise No Yes    Driving No Yes    School No Yes    Treadmill/stationary bike No Yes    Dance / Yoga No when comfortable   Bicycling No No Yes   Light jogging No No Yes   Easy gym class No No Yes   Non-contact sports No No Yes   Skating No No Yes   Lifting more than 20 lbs. No No Yes   Horse riding - no jumping No No Yes   Surfing No No Yes   Skiing - water & snow No No No Yes  Bowling No No No Yes  Amusement park rides No No No Yes  Gymnastics No No No Yes  Parachuting No No No Yes  Dirt bike racing No No No Yes  Contact sports No No No Yes  Rollerblading No No No Yes  Skateboarding No No No Yes  Snowboarding              No No No Yes

## 2025-05-01 NOTE — PROGRESS NOTES
Progress Note - Orthopedics   Name: Luciana Orozco 19 y.o. adult I MRN: [id number]  Unit/Bed#: PICU 333-01 I Date of Admission: 4/29/2025   Date of Service: 5/1/2025 I Hospital Day: 1      Assessment & Plan  S/P spinal surgery   18 y/o POD  PSF T3-L2 with apical jw osteotomies for Scheurmann's disease. Doing well overall post op.     WBAT B/L LE  Will need scoliosis films prior to discharge  Multimodal pain control   PT/OT  Will monitor for ABLA and administer IVF/prbc as indicated for Greater than 2 gram drop or Hgb < 7  Medical management per PICU team, recommendations appreciated  Dispo planning       Please contact the SecureChat role for the Orthopedics service with any questions/concerns.    History of Present Illness   19 y.o. adult No acute events, no acute distress. Denies fever/chills, SOB. Patient is still having some pain which she describes as a pinching/pulling around her surgical site.     Objective      Temp:  [98.4 °F (36.9 °C)-99 °F (37.2 °C)] 98.6 °F (37 °C)  HR:  [] 94  BP: (103-140)/(54-83) 109/59  Resp:  [17-38] 21  SpO2:  [91 %-100 %] 94 %  O2 Device: None (Room air)  Nasal Cannula O2 Flow Rate (L/min):  [2 L/min] 2 L/min  O2 Device: None (Room air)          I/O last 24 hours:  In: 3717.7 [P.O.:540; I.V.:2727.7; IV Piggyback:450]  Out: 2625 [Urine:2325; Drains:300]  Lines/Drains/Airways       Active Status       Name Placement date Placement time Site Days    Closed/Suction Drain Inferior;Right Back Accordion 10 Fr. 04/29/25  1813  Back  1                  Physical Exam   Musculoskeletal: Bilateral Lower Extremity  Dressing C/D/I   HV drain in place and pulling suction appropriately, 200 cc out over last 24 hours, none recorded overnight.  TTP rosario-incisionally  Sensation intact to light touch L3-S1  BL LE Motor 5/5 hip flexion, 5/5 knee ext, 5/5 ankle DF, 5/5 EHL, 5/5 FHL, 5/5 ankle PF  Digits warm well perfused with brisk cap refill  No calf swelling or ttp        Lab Results: I  "have reviewed the following results:  Recent Labs     04/29/25  1755 04/29/25 2047 04/29/25 2047 04/30/25  0523   WBC  --  22.52*  --  14.20*   HGB 11.2* 11.1*  --  9.9*   HCT 33* 35.1*  --  29.4*   PLT  --  316   < > 252   BUN  --   --   --  4*   CREATININE  --   --   --  0.53*    < > = values in this interval not displayed.     Blood Culture:  No results found for: \"BLOODCX\"  Wound Culture: No results found for: \"WOUNDCULT\"      "

## 2025-05-01 NOTE — ANESTHESIA POSTPROCEDURE EVALUATION
Post-Op Assessment Note            No anethesia notable event occurred.    Staff: Anesthesiologist           Last Filed PACU Vitals:  Vitals Value Taken Time   Temp 98.1 °F (36.7 °C) 05/01/25 0800   Pulse 90 05/01/25 0943   /69 05/01/25 0800   Resp 32 05/01/25 0812   SpO2 95 % 05/01/25 0943   Vitals shown include unfiled device data.

## 2025-05-02 ENCOUNTER — APPOINTMENT (INPATIENT)
Dept: RADIOLOGY | Facility: HOSPITAL | Age: 20
DRG: 303 | End: 2025-05-02
Attending: STUDENT IN AN ORGANIZED HEALTH CARE EDUCATION/TRAINING PROGRAM
Payer: COMMERCIAL

## 2025-05-02 VITALS
HEIGHT: 66 IN | WEIGHT: 288.14 LBS | SYSTOLIC BLOOD PRESSURE: 162 MMHG | RESPIRATION RATE: 18 BRPM | OXYGEN SATURATION: 99 % | DIASTOLIC BLOOD PRESSURE: 92 MMHG | TEMPERATURE: 98 F | HEART RATE: 98 BPM | BODY MASS INDEX: 46.31 KG/M2

## 2025-05-02 LAB
DME PARACHUTE DELIVERY DATE REQUESTED: NORMAL
DME PARACHUTE DELIVERY DATE REQUESTED: NORMAL
DME PARACHUTE ITEM DESCRIPTION: NORMAL
DME PARACHUTE ITEM DESCRIPTION: NORMAL
DME PARACHUTE ORDER STATUS: NORMAL
DME PARACHUTE ORDER STATUS: NORMAL
DME PARACHUTE SUPPLIER NAME: NORMAL
DME PARACHUTE SUPPLIER NAME: NORMAL
DME PARACHUTE SUPPLIER PHONE: NORMAL
DME PARACHUTE SUPPLIER PHONE: NORMAL

## 2025-05-02 PROCEDURE — 97116 GAIT TRAINING THERAPY: CPT

## 2025-05-02 PROCEDURE — 99024 POSTOP FOLLOW-UP VISIT: CPT | Performed by: ORTHOPAEDIC SURGERY

## 2025-05-02 PROCEDURE — 72082 X-RAY EXAM ENTIRE SPI 2/3 VW: CPT

## 2025-05-02 PROCEDURE — 97530 THERAPEUTIC ACTIVITIES: CPT

## 2025-05-02 PROCEDURE — 99232 SBSQ HOSP IP/OBS MODERATE 35: CPT | Performed by: PEDIATRICS

## 2025-05-02 RX ORDER — ACETAMINOPHEN 325 MG/1
975 TABLET ORAL EVERY 6 HOURS SCHEDULED
Qty: 360 TABLET | Refills: 0 | Status: SHIPPED | OUTPATIENT
Start: 2025-05-02

## 2025-05-02 RX ORDER — IBUPROFEN 600 MG/1
600 TABLET, FILM COATED ORAL EVERY 6 HOURS SCHEDULED
Qty: 30 TABLET | Refills: 0 | Status: SHIPPED | OUTPATIENT
Start: 2025-05-02

## 2025-05-02 RX ADMIN — DIAZEPAM 5 MG: 5 TABLET ORAL at 07:41

## 2025-05-02 RX ADMIN — ACETAMINOPHEN 975 MG: 325 TABLET, FILM COATED ORAL at 05:58

## 2025-05-02 RX ADMIN — FAMOTIDINE 20 MG: 20 TABLET, FILM COATED ORAL at 08:38

## 2025-05-02 RX ADMIN — IBUPROFEN 600 MG: 600 TABLET, FILM COATED ORAL at 07:41

## 2025-05-02 RX ADMIN — DIAZEPAM 5 MG: 5 TABLET ORAL at 01:56

## 2025-05-02 RX ADMIN — ACETAMINOPHEN 975 MG: 325 TABLET, FILM COATED ORAL at 00:15

## 2025-05-02 RX ADMIN — CEFAZOLIN SODIUM 2000 MG: 2 SOLUTION INTRAVENOUS at 01:10

## 2025-05-02 RX ADMIN — ACETAMINOPHEN 975 MG: 325 TABLET, FILM COATED ORAL at 11:20

## 2025-05-02 RX ADMIN — POLYETHYLENE GLYCOL 3350 17 G: 17 POWDER, FOR SOLUTION ORAL at 08:38

## 2025-05-02 RX ADMIN — IBUPROFEN 600 MG: 600 TABLET, FILM COATED ORAL at 01:56

## 2025-05-02 RX ADMIN — CEFAZOLIN SODIUM 2000 MG: 2 SOLUTION INTRAVENOUS at 08:42

## 2025-05-02 RX ADMIN — DOCUSATE SODIUM 50 MG: 50 CAPSULE, LIQUID FILLED ORAL at 08:38

## 2025-05-02 RX ADMIN — DIAZEPAM 5 MG: 5 TABLET ORAL at 13:39

## 2025-05-02 RX ADMIN — GABAPENTIN 300 MG: 300 CAPSULE ORAL at 08:38

## 2025-05-02 RX ADMIN — OXYCODONE HYDROCHLORIDE 5 MG: 5 TABLET ORAL at 14:26

## 2025-05-02 RX ADMIN — IBUPROFEN 600 MG: 600 TABLET, FILM COATED ORAL at 13:39

## 2025-05-02 RX ADMIN — Medication 2000 UNITS: at 08:38

## 2025-05-02 NOTE — PLAN OF CARE
Problem: PHYSICAL THERAPY ADULT  Goal: Performs mobility at highest level of function for planned discharge setting.  See evaluation for individualized goals.  Description: Treatment/Interventions: Functional transfer training, LE strengthening/ROM, Elevations, Therapeutic exercise, Endurance training, Patient/family training, Bed mobility, Equipment eval/education, Gait training, Spoke to nursing, Spoke to case management, OT, Family  Equipment Recommended:  (has RW PTA; would benefit from commode)       See flowsheet documentation for full assessment, interventions and recommendations.  Outcome: Progressing  Note: Prognosis: Good  Problem List: Decreased endurance, Decreased mobility, Pain  Assessment: Pt seen for PT treatment session this date. Therapy session focused on bed mobility, functional transfers, gait training, stair training, and endurance training in order to improve overall mobility and independence. Pt requires SBA for all mobility performed this date. Pt making steady progress toward goals as demonstrated by ambulating increased distances with decreased physical assistance levels. Also performed/ tolerated stair trial/ training this- both mother and grandmother also present and engaged in family training. Pt occasionally requires standing rest breaks d/t back pain, however resolves with time. Pt was left sitting OOB in recliner at the end of PT session with all needs in reach. Pt would benefit from continued PT services while in hospital to address remaining limitations. PT to continue to follow pt and recommends home with family support as needed. The patient's AM-PAC Basic Mobility Inpatient Short Form Raw Score is 18. A Raw score of greater than 16 suggests the patient may benefit from discharge to home. Please also refer to the recommendation of the Physical Therapist for safe discharge planning.  Barriers to Discharge: None     Rehab Resource Intensity Level, PT: No post-acute rehabilitation  needs (may consider OPPT when cleared by orthopedic team @ f/u appointment)    See flowsheet documentation for full assessment.

## 2025-05-02 NOTE — ASSESSMENT & PLAN NOTE
20 y/o POD  PSF T3-L2 with apical jw osteotomies for Scheurmann's disease. Doing well overall post op.     WBAT B/L LE  Will need scoliosis films prior to discharge  Multimodal pain control   PT/OT  Will monitor for ABLA and administer IVF/prbc as indicated for Greater than 2 gram drop or Hgb < 7  Medical management per PICU team, recommendations appreciated  Dispo planning

## 2025-05-02 NOTE — PHYSICAL THERAPY NOTE
PHYSICAL THERAPY NOTE          Patient Name: Luciana Orozco  Today's Date: 5/2/2025 05/02/25 1159   PT Last Visit   PT Visit Date 05/02/25   Note Type   Note Type Treatment   Pain Assessment   Pain Assessment Tool 0-10   Pain Score 3   Pain Location/Orientation Orientation: Right;Location: Back   Hospital Pain Intervention(s) Repositioned;Ambulation/increased activity;Emotional support   Restrictions/Precautions   Weight Bearing Precautions Per Order Yes   RUE Weight Bearing Per Order WBAT   LUE Weight Bearing Per Order WBAT   RLE Weight Bearing Per Order WBAT   LLE Weight Bearing Per Order WBAT   Other Precautions Multiple lines;Fall Risk;Pain;Spinal precautions  (+ homevac drain)   General   Chart Reviewed Yes   Response to Previous Treatment Patient with no complaints from previous session.   Family/Caregiver Present No   Cognition   Overall Cognitive Status WFL   Arousal/Participation Responsive;Alert;Cooperative   Attention Within functional limits   Orientation Level Oriented X4   Memory Within functional limits   Following Commands Follows all commands and directions without difficulty   Comments very pleasant and cooperative, motivated to participate and go home   Bed Mobility   Supine to Sit 5  Supervision   Additional items HOB elevated;Bedrails;Increased time required   Sit to Supine Unable to assess   Additional Comments pt supine in bed upon arrival. pt left sitting OOB in recliner with all needs within reach   Transfers   Sit to Stand 5  Supervision   Additional items Armrests;Increased time required;Verbal cues   Stand to Sit 5  Supervision   Additional items Armrests;Increased time required;Verbal cues   Additional Comments transfers with RW; completes 2x STS t/o session   Ambulation/Elevation   Gait pattern Excessively slow;Short stride;Decreased foot clearance   Gait Assistance 5  Supervision   Additional  items Verbal cues   Assistive Device Rolling walker   Distance 2 x 200'   Stair Management Assistance 5  Supervision   Additional items Verbal cues   Stair Management Technique One rail L;Step to pattern;Foreward;Sideways  (Pt educated to ascend leading with non-painful side (LLE) and descend leading with more painful side (RLE))   Number of Stairs 12   Balance   Static Sitting Fair +   Dynamic Sitting Fair +   Static Standing Fair   Dynamic Standing Fair   Ambulatory Fair   Activity Tolerance   Activity Tolerance Patient tolerated treatment well   Medical Staff Made Aware restorative MADYSON Boogie, orthopedic team   Nurse Made Aware RN cleared   Assessment   Prognosis Good   Problem List Decreased endurance;Decreased mobility;Pain   Assessment Pt seen for PT treatment session this date. Therapy session focused on bed mobility, functional transfers, gait training, stair training, and endurance training in order to improve overall mobility and independence. Pt requires SBA for all mobility performed this date. Pt making steady progress toward goals as demonstrated by ambulating increased distances with decreased physical assistance levels. Also performed/ tolerated stair trial/ training this- both mother and grandmother also present and engaged in family training. Pt occasionally requires standing rest breaks d/t back pain, however resolves with time. Pt was left sitting OOB in recliner at the end of PT session with all needs in reach. Pt would benefit from continued PT services while in hospital to address remaining limitations. PT to continue to follow pt and recommends home with family support as needed. The patient's AM-PAC Basic Mobility Inpatient Short Form Raw Score is 18. A Raw score of greater than 16 suggests the patient may benefit from discharge to home. Please also refer to the recommendation of the Physical Therapist for safe discharge planning.   Barriers to Discharge None   Goals   Patient Goals to go home    STG Expiration Date 05/14/25   PT Treatment Day 2   Plan   Treatment/Interventions ADL retraining;Functional transfer training;LE strengthening/ROM;Elevations;Therapeutic exercise;Endurance training;Patient/family training;Equipment eval/education;Bed mobility;Gait training;Spoke to nursing;Spoke to case management;OT   Progress Progressing toward goals   PT Frequency 3-5x/wk   Discharge Recommendation   Rehab Resource Intensity Level, PT No post-acute rehabilitation needs  (may consider OPPT when cleared by orthopedic team @ f/u appointment)   Equipment Recommended Walker  (per pt, grandmother owns and pt can borrow)   Additional Comments pt would like BSC for dc- CM made aware   AM-PAC Basic Mobility Inpatient   Turning in Flat Bed Without Bedrails 3   Lying on Back to Sitting on Edge of Flat Bed Without Bedrails 3   Moving Bed to Chair 3   Standing Up From Chair Using Arms 3   Walk in Room 3   Climb 3-5 Stairs With Railing 3   Basic Mobility Inpatient Raw Score 18   Basic Mobility Standardized Score 41.05   [facility] Highest Level Of Mobility   JH-HLM Goal 6: Walk 10 steps or more   JH-HLM Achieved 8: Walk 250 feet ot more   Education   Education Provided Mobility training;Assistive device   Patient Demonstrates acceptance/verbal understanding   End of Consult   Patient Position at End of Consult Bedside chair;All needs within reach     Josie Craig, PT, DPT

## 2025-05-02 NOTE — PROGRESS NOTES
Progress Note - Pediatric Intensive Care   Name: Luciana Orozco 19 y.o. adult I MRN: [id number]  Unit/Bed#: PICU 333-01 I Date of Admission: 2025   Date of Service: 2025 I Hospital Day: 2       Assessment & Plan   Luciana Orozco is a 20 y/o female with history of asthma, bipolar d/o, ADHD, obesity, and Scheuermann's disease, now POD#3 s/p T3-L2 posterior spinal fusion. Overall uncomplicated surgical case - no neuromonitoring concerns or excessive blood loss by report. Admitted postoperatively for neurovascular and hemodynamic monitoring; PICU level of care is warranted.      Neuro:   - ongoing monitoring  - continue PO Tylenol and Ibuprofen  - continue PO valium   - continue gabapentin  - continue oxycodone PRN       CV:   - continuous monitoring     Resp:   - continuous SpO2 monitoring in room air  - incentive spirometry     FEN/GI:   - regular diet  - continue famotidine  - zofran PRN  - bowel regimen     :   - strict I's/O's     ID:   - continue Ancef x48hr rosario-op / until drain removed, per ortho     Heme:   - Hgb only slightly downtrending (likely dilutional); monitor clinically     Endo:   - no active issues                Msk/Skin:   - PT/OT  - appreciate ortho input     Disposition: PICU     Patient updated on plan at bedside.      24 Hour Events : Patient was up out of bed walking with PT, doing well. Pain well controlled. No acute events.      Objective :  Temp:  [97.5 °F (36.4 °C)-98.2 °F (36.8 °C)] 98 °F (36.7 °C)  HR:  [] 97  BP: ()/(53-90) 111/59  Resp:  [12-38] 20  SpO2:  [94 %-100 %] 98 %  O2 Device: None (Room air)    Arterial Line BP: 131/73  Arterial Line MAP (mmHg): 95 mmHg    Temperature:   Temp (24hrs), Av.9 °F (36.6 °C), Min:97.5 °F (36.4 °C), Max:98.2 °F (36.8 °C)    Current: Temperature: 98 °F (36.7 °C)    Weights:   IBW (Ideal Body Weight): 59.3 kg    Body mass index is 46.51 kg/m².  Weight (last 2 days)       Date/Time    25    Comment rows:     OBSERV: pt. sleeping at 04/30/25 2045          Physical Exam  Constitutional:       General: She is not in acute distress.     Appearance: She is obese.   HENT:      Head: Normocephalic and atraumatic.      Mouth/Throat:      Mouth: Mucous membranes are moist.   Eyes:      Conjunctiva/sclera: Conjunctivae normal.   Cardiovascular:      Rate and Rhythm: Normal rate and regular rhythm.      Pulses: Normal pulses.      Heart sounds: Normal heart sounds. No murmur heard.  Pulmonary:      Effort: Pulmonary effort is normal. No respiratory distress.      Breath sounds: Normal breath sounds. No wheezing or rales.   Abdominal:      General: There is no distension.      Palpations: Abdomen is soft.      Tenderness: There is no abdominal tenderness.   Musculoskeletal:         General: No swelling.      Cervical back: Normal range of motion and neck supple.      Right lower leg: No edema.      Left lower leg: No edema.      Comments: Drain in place; spinal dressing c/d/i   Skin:     General: Skin is warm and dry.      Capillary Refill: Capillary refill takes less than 2 seconds.   Neurological:      General: No focal deficit present.      Mental Status: She is alert and oriented to person, place, and time.       Medications:   Scheduled Meds:  Current Facility-Administered Medications   Medication Dose Route Frequency Provider Last Rate    acetaminophen  975 mg Oral Q6H [facility] Jitendra Pereira MD      cefazolin  2,000 mg Intravenous Q8H Papo Bourne DO 2,000 mg (05/02/25 0842)    cholecalciferol  2,000 Units Oral Daily Joseph Owen Padilla MD      diazepam  5 mg Oral Q6H Christina J Palladino, MD      docusate sodium  50 mg Oral BID Christina J Palladino, MD      famotidine  20 mg Oral BID Christina J Palladino, MD      gabapentin  300 mg Oral Daily Christina J Palladino, MD      ibuprofen  600 mg Oral Q6H [facility] Jitendra Pereira MD      ondansetron  4 mg Intravenous Q8H PRN Gabriel Mcclain MD      oxyCODONE  5 mg  Oral Q4H PRN Christina J Palladino, MD      polyethylene glycol  17 g Oral Daily Christina J Palladino, MD      senna  2 tablet Oral HS PRN Christina J Palladino, MD       Continuous Infusions:   PRN Meds:  ondansetron, 4 mg, Q8H PRN  oxyCODONE, 5 mg, Q4H PRN  senna, 2 tablet, HS PRN      Invasive lines and devices:  Invasive Devices       Peripheral Intravenous Line  Duration             Peripheral IV 04/29/25 Left Hand 3 days    Peripheral IV 04/29/25 Right Hand 3 days              Drain  Duration             Closed/Suction Drain Inferior;Right Back Accordion 10 Fr. 2 days                  Non-Invasive/Invasive Ventilation Settings:  Respiratory      Lab Data (Last 4 hours)      None           O2/Vent Data (Last 4 hours)      None                  SpO2: SpO2: 98 % in room air    Intake and Outputs:  I/O         04/30 0701  05/01 0700 05/01 0701  05/02 0700 05/02 0701  05/03 0700    P.O. 1020 651     I.V. (mL/kg) 606 (4.63)      IV Piggyback 200 100     Cell Saver       Total Intake(mL/kg) 1826 (13.94) 751 (5.73)     Urine (mL/kg/hr) 550 (0.17) 0 (0)     Emesis/NG output       Drains 340 315     Blood       Total Output 890 315     Net +936 +436            Unmeasured Urine Occurrence 5 x 3 x           UOP: not strictly quantified; subjectively adequate      Lab Results: I have reviewed the following results:  Results from last 7 days   Lab Units 04/30/25  0523 04/29/25  2047 04/29/25  1755   WBC Thousand/uL 14.20* 22.52*  --    HEMOGLOBIN g/dL 9.9* 11.1*  --    I STAT HEMOGLOBIN g/dl  --   --  11.2*   HEMATOCRIT % 29.4* 35.1*  --    HEMATOCRIT, ISTAT %  --   --  33*   PLATELETS Thousands/uL 252 316  --    SEGS PCT % 78* 91*  --    MONO PCT % 11 5  --    EOS PCT % 0 0  --       Results from last 7 days   Lab Units 04/30/25  0523 04/29/25  1755 04/29/25  1354 04/29/25  0856   SODIUM mmol/L 140  --   --   --    POTASSIUM mmol/L 3.5  --   --   --    CHLORIDE mmol/L 109*  --   --   --    CO2 mmol/L 24  --   --   --   "  CO2, I-STAT mmol/L  --  19* 21 24   BUN mg/dL 4*  --   --   --    CREATININE mg/dL 0.53*  --   --   --    CALCIUM mg/dL 8.0*  --   --   --    ALBUMIN g/dL 3.6  --   --   --    ALK PHOS U/L 48  --   --   --    ALT U/L 17  --   --   --    AST U/L 28  --   --   --    GLUCOSE, ISTAT mg/dl  --  100 110 113                 No results found for: \"PHART\", \"EUT6AQU\", \"PO2ART\", \"LJZ2LQQ\", \"L9NHZVUJ\", \"BEART\", \"SOURCE\"    Micro:  No results found for: \"BLOODCX\", \"URINECX\", \"WOUNDCULT\", \"SPUTUMCULTUR\"    Imaging Results Review: No pertinent imaging studies reviewed.  Other Study Results Review: No additional pertinent studies reviewed.    Code Status: Prior    Critical Care Time Statement: Upon my evaluation, this patient had a high probability of imminent or life-threatening deterioration due to post-operative complications, which required my direct attention, intervention, and personal management.  I spent a total of 30 minutes directly providing critical care services, including interpretation of complex medical databases, evaluating for the presence of life-threatening injuries or illnesses, and complex medical decision making (to support/prevent further life-threatening deterioration).. This time is exclusive of procedures, teaching, family meetings, and any prior time recorded by providers other than myself.    "

## 2025-05-02 NOTE — PLAN OF CARE
Problem: Prexisting or High Potential for Compromised Skin Integrity  Goal: Skin integrity is maintained or improved  Description: INTERVENTIONS:- Identify patients at risk for skin breakdown- Assess and monitor skin integrity- Assess and monitor nutrition and hydration status- Monitor labs - Assess for incontinence - Turn and reposition patient- Assist with mobility/ambulation- Relieve pressure over bony prominences- Avoid friction and shearing- Provide appropriate hygiene as needed including keeping skin clean and dry- Evaluate need for skin moisturizer/barrier cream- Collaborate with interdisciplinary team - Patient/family teaching- Consider wound care consult   Outcome: Progressing     Problem: PAIN - ADULT  Goal: Verbalizes/displays adequate comfort level or baseline comfort level  Description: Interventions:- Encourage patient to monitor pain and request assistance- Assess pain using appropriate pain scale- Administer analgesics based on type and severity of pain and evaluate response- Implement non-pharmacological measures as appropriate and evaluate response- Consider cultural and social influences on pain and pain management- Notify physician/advanced practitioner if interventions unsuccessful or patient reports new pain  Outcome: Progressing     Problem: INFECTION - ADULT  Goal: Absence or prevention of progression during hospitalization  Description: INTERVENTIONS:- Assess and monitor for signs and symptoms of infection- Monitor lab/diagnostic results- Monitor all insertion sites, i.e. indwelling lines, tubes, and drains- Monitor endotracheal if appropriate and nasal secretions for changes in amount and color- [place] appropriate cooling/warming therapies per order- Administer medications as ordered- Instruct and encourage patient and family to use good hand hygiene technique- Identify and instruct in appropriate isolation precautions for identified infection/condition  Outcome: Progressing  Goal:  Absence of fever/infection during neutropenic period  Description: INTERVENTIONS:- Monitor WBC  Outcome: Progressing     Problem: SAFETY ADULT  Goal: Patient will remain free of falls  Description: INTERVENTIONS:- Educate patient/family on patient safety including physical limitations- Instruct patient to call for assistance with activity - Consult OT/PT to assist with strengthening/mobility - Keep Call bell within reach- Keep bed low and locked with side rails adjusted as appropriate- Keep care items and personal belongings within reach- Initiate and maintain comfort rounds- Make Fall Risk Sign visible to staff- Offer Toileting every  Hours, in advance of need- Initiate/Maintain alarm- Obtain necessary fall risk management equipment: - Apply yellow socks and bracelet for high fall risk patients- Consider moving patient to room near nurses station  Outcome: Progressing     Problem: DISCHARGE PLANNING  Goal: Discharge to home or other facility with appropriate resources  Description: INTERVENTIONS:- Identify barriers to discharge w/patient and caregiver- Arrange for needed discharge resources and transportation as appropriate- Identify discharge learning needs (meds, wound care, etc.)- Arrange for interpretive services to assist at discharge as needed- Refer to Case Management Department for coordinating discharge planning if the patient needs post-hospital services based on physician/advanced practitioner order or complex needs related to functional status, cognitive ability, or social support system  Outcome: Progressing     Problem: Knowledge Deficit  Goal: Patient/family/caregiver demonstrates understanding of disease process, treatment plan, medications, and discharge instructions  Description: Complete learning assessment and assess knowledge base.Interventions:- Provide teaching at level of understanding- Provide teaching via preferred learning methods  Outcome: Progressing     Problem: SAFETY ADULT  Goal:  Maintain or return to baseline ADL function  Description: INTERVENTIONS:-  Assess patient's ability to carry out ADLs; assess patient's baseline for ADL function and identify physical deficits which impact ability to perform ADLs (bathing, care of mouth/teeth, toileting, grooming, dressing, etc.)- Assess/evaluate cause of self-care deficits - Assess range of motion- Assess patient's mobility; develop plan if impaired- Assess patient's need for assistive devices and provide as appropriate- Encourage maximum independence but intervene and supervise when necessary- Involve family in performance of ADLs- Assess for home care needs following discharge - Consider OT consult to assist with ADL evaluation and planning for discharge- Provide patient education as appropriate  INTERVENTIONS:-  Assess patient's ability to carry out ADLs; assess patient's baseline for ADL function and identify physical deficits which impact ability to perform ADLs (bathing, care of mouth/teeth, toileting, grooming, dressing, etc.)- Assess/evaluate cause of self-care deficits - Assess range of motion- Assess patient's mobility; develop plan if impaired- Assess patient's need for assistive devices and provide as appropriate- Encourage maximum independence but intervene and supervise when necessary- Involve family in performance of ADLs- Assess for home care needs following discharge - Consider OT consult to assist with ADL evaluation and planning for discharge- Provide patient education as appropriate  Outcome: Not Progressing  Goal: Maintains/Returns to pre admission functional level  Description: INTERVENTIONS:- Perform AM-PAC 6 Click Basic Mobility/ Daily Activity assessment daily.- Set and communicate daily mobility goal to care team and patient/family/caregiver. - Collaborate with rehabilitation services on mobility goals if consulted- Perform Range of Motion  times a day.- Reposition patient every  hours.- Dangle patient  times a day- Stand  patient  times a day- Ambulate patient  times a day- Out of bed to chair  times a day - Out of bed for meals  times a day- Out of bed for toileting- Record patient progress and toleration of activity level   INTERVENTIONS:- Perform AM-PAC 6 Click Basic Mobility/ Daily Activity assessment daily.- Set and communicate daily mobility goal to care team and patient/family/caregiver. - Collaborate with rehabilitation services on mobility goals if consulted- Perform Range of Motion  times a day.- Reposition patient every  hours.- Dangle patient  times a day- Stand patient  times a day- Ambulate patient  times a day- Out of bed to chair  times a day - Out of bed for meals  times a day- Out of bed for toileting- Record patient progress and toleration of activity level   Outcome: Not Progressing     Problem: MOBILITY - ADULT  Goal: Maintain or return to baseline ADL function  Description: INTERVENTIONS:-  Assess patient's ability to carry out ADLs; assess patient's baseline for ADL function and identify physical deficits which impact ability to perform ADLs (bathing, care of mouth/teeth, toileting, grooming, dressing, etc.)- Assess/evaluate cause of self-care deficits - Assess range of motion- Assess patient's mobility; develop plan if impaired- Assess patient's need for assistive devices and provide as appropriate- Encourage maximum independence but intervene and supervise when necessary- Involve family in performance of ADLs- Assess for home care needs following discharge - Consider OT consult to assist with ADL evaluation and planning for discharge- Provide patient education as appropriate  INTERVENTIONS:-  Assess patient's ability to carry out ADLs; assess patient's baseline for ADL function and identify physical deficits which impact ability to perform ADLs (bathing, care of mouth/teeth, toileting, grooming, dressing, etc.)- Assess/evaluate cause of self-care deficits - Assess range of motion- Assess patient's mobility;  develop plan if impaired- Assess patient's need for assistive devices and provide as appropriate- Encourage maximum independence but intervene and supervise when necessary- Involve family in performance of ADLs- Assess for home care needs following discharge - Consider OT consult to assist with ADL evaluation and planning for discharge- Provide patient education as appropriate  Outcome: Not Progressing  Goal: Maintains/Returns to pre admission functional level  Description: INTERVENTIONS:- Perform AM-PAC 6 Click Basic Mobility/ Daily Activity assessment daily.- Set and communicate daily mobility goal to care team and patient/family/caregiver. - Collaborate with rehabilitation services on mobility goals if consulted- Perform Range of Motion  times a day.- Reposition patient every  hours.- Dangle patient times a day- Stand patient  times a day- Ambulate patient times a day- Out of bed to chair  times a day - Out of bed for meals  times a day- Out of bed for toileting- Record patient progress and toleration of activity level   INTERVENTIONS:- Perform AM-PAC 6 Click Basic Mobility/ Daily Activity assessment daily.- Set and communicate daily mobility goal to care team and patient/family/caregiver. - Collaborate with rehabilitation services on mobility goals if consulted- Perform Range of Motion  times a day.- Reposition patient every  hours.- Dangle patient  times a day- Stand patient  times a day- Ambulate patient  times a day- Out of bed to chair  times a day - Out of bed for meals  times a day- Out of bed for toileting- Record patient progress and toleration of activity level   Outcome: Not Progressing

## 2025-05-02 NOTE — PLAN OF CARE
Problem: Prexisting or High Potential for Compromised Skin Integrity  Goal: Skin integrity is maintained or improved  Description: INTERVENTIONS:- Identify patients at risk for skin breakdown- Assess and monitor skin integrity- Assess and monitor nutrition and hydration status- Monitor labs - Assess for incontinence - Turn and reposition patient- Assist with mobility/ambulation- Relieve pressure over bony prominences- Avoid friction and shearing- Provide appropriate hygiene as needed including keeping skin clean and dry- Evaluate need for skin moisturizer/barrier cream- Collaborate with interdisciplinary team - Patient/family teaching- Consider wound care consult   Outcome: Adequate for Discharge     Problem: PAIN - ADULT  Goal: Verbalizes/displays adequate comfort level or baseline comfort level  Description: Interventions:- Encourage patient to monitor pain and request assistance- Assess pain using appropriate pain scale- Administer analgesics based on type and severity of pain and evaluate response- Implement non-pharmacological measures as appropriate and evaluate response- Consider cultural and social influences on pain and pain management- Notify physician/advanced practitioner if interventions unsuccessful or patient reports new pain  Outcome: Adequate for Discharge     Problem: INFECTION - ADULT  Goal: Absence or prevention of progression during hospitalization  Description: INTERVENTIONS:- Assess and monitor for signs and symptoms of infection- Monitor lab/diagnostic results- Monitor all insertion sites, i.e. indwelling lines, tubes, and drains- Monitor endotracheal if appropriate and nasal secretions for changes in amount and color- [place] appropriate cooling/warming therapies per order- Administer medications as ordered- Instruct and encourage patient and family to use good hand hygiene technique- Identify and instruct in appropriate isolation precautions for identified infection/condition  Outcome:  Adequate for Discharge  Goal: Absence of fever/infection during neutropenic period  Description: INTERVENTIONS:- Monitor WBC  Outcome: Adequate for Discharge     Problem: SAFETY ADULT  Goal: Patient will remain free of falls  Description: INTERVENTIONS:- Educate patient/family on patient safety including physical limitations- Instruct patient to call for assistance with activity - Consult OT/PT to assist with strengthening/mobility - Keep Call bell within reach- Keep bed low and locked with side rails adjusted as appropriate- Keep care items and personal belongings within reach- Initiate and maintain comfort rounds- Make Fall Risk Sign visible to staff- Offer Toileting every 2 Hours, in advance of need- Initiate/Maintain alarm- Obtain necessary fall risk management equipment: - Apply yellow socks and bracelet for high fall risk patients- Consider moving patient to room near nurses station  Outcome: Adequate for Discharge  Goal: Maintain or return to baseline ADL function  Description: INTERVENTIONS:-  Assess patient's ability to carry out ADLs; assess patient's baseline for ADL function and identify physical deficits which impact ability to perform ADLs (bathing, care of mouth/teeth, toileting, grooming, dressing, etc.)- Assess/evaluate cause of self-care deficits - Assess range of motion- Assess patient's mobility; develop plan if impaired- Assess patient's need for assistive devices and provide as appropriate- Encourage maximum independence but intervene and supervise when necessary- Involve family in performance of ADLs- Assess for home care needs following discharge - Consider OT consult to assist with ADL evaluation and planning for discharge- Provide patient education as appropriate  INTERVENTIONS:-  Assess patient's ability to carry out ADLs; assess patient's baseline for ADL function and identify physical deficits which impact ability to perform ADLs (bathing, care of mouth/teeth, toileting, grooming,  dressing, etc.)- Assess/evaluate cause of self-care deficits - Assess range of motion- Assess patient's mobility; develop plan if impaired- Assess patient's need for assistive devices and provide as appropriate- Encourage maximum independence but intervene and supervise when necessary- Involve family in performance of ADLs- Assess for home care needs following discharge - Consider OT consult to assist with ADL evaluation and planning for discharge- Provide patient education as appropriate  Outcome: Adequate for Discharge  Goal: Maintains/Returns to pre admission functional level  Description: INTERVENTIONS:- Perform AM-PAC 6 Click Basic Mobility/ Daily Activity assessment daily.- Set and communicate daily mobility goal to care team and patient/family/caregiver. - Collaborate with rehabilitation services on mobility goals if consulted- Perform Range of Motion 6 times a day.- Reposition patient every 2 hours.- Dangle patient 3 times a day- Stand patient 3 times a day- Ambulate patient 3 times a day- Out of bed to chair 3 times a day - Out of bed for meals 3 times a day- Out of bed for toileting- Record patient progress and toleration of activity level   INTERVENTIONS:- Perform AM-PAC 6 Click Basic Mobility/ Daily Activity assessment daily.- Set and communicate daily mobility goal to care team and patient/family/caregiver. - Collaborate with rehabilitation services on mobility goals if consulted- Perform Range of Motion 6 times a day.- Reposition patient every 2 hours.- Dangle patient 3 times a day- Stand patient 3 times a day- Ambulate patient 3 times a day- Out of bed to chair 3 times a day - Out of bed for meals 3 times a day- Out of bed for toileting- Record patient progress and toleration of activity level   Outcome: Adequate for Discharge     Problem: DISCHARGE PLANNING  Goal: Discharge to home or other facility with appropriate resources  Description: INTERVENTIONS:- Identify barriers to discharge w/patient and  caregiver- Arrange for needed discharge resources and transportation as appropriate- Identify discharge learning needs (meds, wound care, etc.)- Arrange for interpretive services to assist at discharge as needed- Refer to Case Management Department for coordinating discharge planning if the patient needs post-hospital services based on physician/advanced practitioner order or complex needs related to functional status, cognitive ability, or social support system  Outcome: Adequate for Discharge     Problem: Knowledge Deficit  Goal: Patient/family/caregiver demonstrates understanding of disease process, treatment plan, medications, and discharge instructions  Description: Complete learning assessment and assess knowledge base.Interventions:- Provide teaching at level of understanding- Provide teaching via preferred learning methods  Outcome: Adequate for Discharge     Problem: MOBILITY - ADULT  Goal: Maintain or return to baseline ADL function  Description: INTERVENTIONS:-  Assess patient's ability to carry out ADLs; assess patient's baseline for ADL function and identify physical deficits which impact ability to perform ADLs (bathing, care of mouth/teeth, toileting, grooming, dressing, etc.)- Assess/evaluate cause of self-care deficits - Assess range of motion- Assess patient's mobility; develop plan if impaired- Assess patient's need for assistive devices and provide as appropriate- Encourage maximum independence but intervene and supervise when necessary- Involve family in performance of ADLs- Assess for home care needs following discharge - Consider OT consult to assist with ADL evaluation and planning for discharge- Provide patient education as appropriate  INTERVENTIONS:-  Assess patient's ability to carry out ADLs; assess patient's baseline for ADL function and identify physical deficits which impact ability to perform ADLs (bathing, care of mouth/teeth, toileting, grooming, dressing, etc.)- Assess/evaluate  cause of self-care deficits - Assess range of motion- Assess patient's mobility; develop plan if impaired- Assess patient's need for assistive devices and provide as appropriate- Encourage maximum independence but intervene and supervise when necessary- Involve family in performance of ADLs- Assess for home care needs following discharge - Consider OT consult to assist with ADL evaluation and planning for discharge- Provide patient education as appropriate  Outcome: Adequate for Discharge  Goal: Maintains/Returns to pre admission functional level  Description: INTERVENTIONS:- Perform AM-PAC 6 Click Basic Mobility/ Daily Activity assessment daily.- Set and communicate daily mobility goal to care team and patient/family/caregiver. - Collaborate with rehabilitation services on mobility goals if consulted- Perform Range of Motion 6 times a day.- Reposition patient every 2 hours.- Dangle patient 3 times a day- Stand patient 3 times a day- Ambulate patient 3 times a day- Out of bed to chair 3 times a day - Out of bed for meals 3 times a day- Out of bed for toileting- Record patient progress and toleration of activity level   INTERVENTIONS:- Perform AM-PAC 6 Click Basic Mobility/ Daily Activity assessment daily.- Set and communicate daily mobility goal to care team and patient/family/caregiver. - Collaborate with rehabilitation services on mobility goals if consulted- Perform Range of Motion 6 times a day.- Reposition patient every 2 hours.- Dangle patient 3 times a day- Stand patient 3 times a day- Ambulate patient 3 times a day- Out of bed to chair 3 times a day - Out of bed for meals 3 times a day- Out of bed for toileting- Record patient progress and toleration of activity level   Outcome: Adequate for Discharge

## 2025-05-02 NOTE — PROGRESS NOTES
Progress Note - Orthopedics   Name: Luciana Orozco 19 y.o. 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 Doing well overall post op.     WBAT B/L LE  Will need scoliosis films prior to discharge  Multimodal pain control   PT/OT  Will monitor for ABLA and administer IVF/prbc as indicated for Greater than 2 gram drop or Hgb < 7  Medical management per PICU team, recommendations appreciated  Dispo planning       Please contact the SecureChat role for the Orthopedics service with any questions/concerns.    History of Present Illness   19 y.o. adult No acute events, no acute distress. Denies fever/chills, SOB. Pain is improving. Patient states he wants to go home.    Objective      Temp:  [97.5 °F (36.4 °C)-98.2 °F (36.8 °C)] 97.5 °F (36.4 °C)  HR:  [] 75  BP: ()/(53-90) 101/56  Resp:  [12-38] 18  SpO2:  [91 %-100 %] 94 %  O2 Device: None (Room air)  O2 Device: None (Room air)          I/O last 24 hours:  In: 1381 [P.O.:1131; IV Piggyback:250]  Out: 740 [Urine:400; Drains:340]  Lines/Drains/Airways       Active Status       Name Placement date Placement time Site Days    Closed/Suction Drain Inferior;Right Back Accordion 10 Fr. 04/29/25  1813  Back  2                  Physical Exam   Musculoskeletal: Bilateral Lower Extremities  Dressing C/D/I   HV drain in place with 110 cc of output overnight.  TTP rosario-incisionally  Sensation intact to light touch L3-S1  BL LE Motor 5/5 hip flexion, 5/5 knee ext, 5/5 ankle DF, 5/5 EHL, 5/5 FHL, 5/5 ankle PF  Digits warm well perfused with brisk cap refill        Lab Results: I have reviewed the following results:  Recent Labs     04/29/25 1755 04/29/25 2047 04/29/25 2047 04/30/25  0523   WBC  --  22.52*  --  14.20*   HGB 11.2* 11.1*  --  9.9*   HCT 33* 35.1*  --  29.4*  "  PLT  --  316   < > 252   BUN  --   --   --  4*   CREATININE  --   --   --  0.53*    < > = values in this interval not displayed.     Blood Culture:  No results found for: \"BLOODCX\"  Wound Culture: No results found for: \"WOUNDCULT\"      "

## 2025-05-02 NOTE — CASE MANAGEMENT
Case Management Progress Note    Patient name Luciana Orozco  Location PICU 333/PICU 333-01 MRN [id number]  : 2005 Date 2025       LOS (days): 2  Geometric Mean LOS (GMLOS) (days):   Days to GMLOS:          PROGRESS NOTE:    Per [facility] Peds liaisonShanell Early, after Dr. GASCA spoke with MIK Parada to accommodate  Request EPIC referral and verbal referral be placed ( [phone] option 2) Dr. GASCA will come to hospital today to evaluate    1:07pm: Per team, OK to d/c home, patient feeling much better able to navigate stairs.   [employer] and BSC requested via paracte for home delivery.   Nursing updated and aware

## 2025-05-05 ENCOUNTER — HOSPITAL ENCOUNTER (EMERGENCY)
Facility: HOSPITAL | Age: 20
Discharge: HOME | End: 2025-05-06
Attending: EMERGENCY MEDICINE
Payer: COMMERCIAL

## 2025-05-05 ENCOUNTER — TELEPHONE (OUTPATIENT)
Dept: OBGYN CLINIC | Facility: HOSPITAL | Age: 20
End: 2025-05-05

## 2025-05-05 ENCOUNTER — APPOINTMENT (EMERGENCY)
Dept: MRI IMAGING | Facility: HOSPITAL | Age: 20
End: 2025-05-05
Payer: COMMERCIAL

## 2025-05-05 ENCOUNTER — APPOINTMENT (EMERGENCY)
Dept: RADIOLOGY | Facility: HOSPITAL | Age: 20
End: 2025-05-05
Payer: COMMERCIAL

## 2025-05-05 DIAGNOSIS — R93.89 ABNORMAL CT OF THE CHEST: ICD-10-CM

## 2025-05-05 DIAGNOSIS — M54.9 BACK PAIN: Primary | ICD-10-CM

## 2025-05-05 DIAGNOSIS — M25.511 RIGHT SHOULDER PAIN: ICD-10-CM

## 2025-05-05 LAB
ALBUMIN SERPL BCG-MCNC: 4.3 G/DL (ref 3.5–5)
ALP SERPL-CCNC: 62 U/L (ref 34–104)
ALT SERPL W P-5'-P-CCNC: 19 U/L (ref 7–52)
ANION GAP SERPL CALCULATED.3IONS-SCNC: 10 MMOL/L (ref 4–13)
APTT PPP: 29 SECONDS (ref 23–34)
AST SERPL W P-5'-P-CCNC: 22 U/L (ref 5–45)
BACTERIA UR QL AUTO: ABNORMAL /HPF
BASOPHILS # BLD AUTO: 0.09 THOUSANDS/ÂΜL (ref 0–0.1)
BASOPHILS NFR BLD AUTO: 1 % (ref 0–1)
BILIRUB SERPL-MCNC: 0.43 MG/DL (ref 0.2–1)
BILIRUB UR QL STRIP: NEGATIVE
BUN SERPL-MCNC: 9 MG/DL (ref 5–25)
CALCIUM SERPL-MCNC: 9.5 MG/DL (ref 8.4–10.2)
CHLORIDE SERPL-SCNC: 102 MMOL/L (ref 96–108)
CLARITY UR: ABNORMAL
CO2 SERPL-SCNC: 25 MMOL/L (ref 21–32)
COLOR UR: YELLOW
CREAT SERPL-MCNC: 0.56 MG/DL (ref 0.6–1.3)
CRP SERPL QL: 45.5 MG/L
EOSINOPHIL # BLD AUTO: 0.33 THOUSAND/ÂΜL (ref 0–0.61)
EOSINOPHIL NFR BLD AUTO: 2 % (ref 0–6)
ERYTHROCYTE [DISTWIDTH] IN BLOOD BY AUTOMATED COUNT: 12.5 % (ref 11.6–15.1)
ERYTHROCYTE [SEDIMENTATION RATE] IN BLOOD: 66 MM/HOUR (ref 0–14)
EXT PREGNANCY TEST URINE: NEGATIVE
EXT. CONTROL: NORMAL
GLUCOSE SERPL-MCNC: 101 MG/DL (ref 65–140)
GLUCOSE UR STRIP-MCNC: NEGATIVE MG/DL
HCT VFR BLD AUTO: 36.3 % (ref 36.5–46.1)
HGB BLD-MCNC: 11.7 G/DL (ref 12–15.4)
HGB UR QL STRIP.AUTO: NEGATIVE
IMM GRANULOCYTES # BLD AUTO: 0.25 THOUSAND/UL (ref 0–0.2)
IMM GRANULOCYTES NFR BLD AUTO: 2 % (ref 0–2)
INR PPP: 0.91 (ref 0.85–1.19)
KETONES UR STRIP-MCNC: ABNORMAL MG/DL
LACTATE SERPL-SCNC: 1.2 MMOL/L (ref 0.5–2)
LEUKOCYTE ESTERASE UR QL STRIP: NEGATIVE
LYMPHOCYTES # BLD AUTO: 2.62 THOUSANDS/ÂΜL (ref 0.6–4.47)
LYMPHOCYTES NFR BLD AUTO: 18 % (ref 14–44)
MCH RBC QN AUTO: 29.7 PG (ref 26.8–34.3)
MCHC RBC AUTO-ENTMCNC: 32.2 G/DL (ref 31.4–37.4)
MCV RBC AUTO: 92 FL (ref 82–98)
MONOCYTES # BLD AUTO: 1.2 THOUSAND/ÂΜL (ref 0.17–1.22)
MONOCYTES NFR BLD AUTO: 8 % (ref 4–12)
MUCOUS THREADS UR QL AUTO: ABNORMAL
NEUTROPHILS # BLD AUTO: 10.19 THOUSANDS/ÂΜL (ref 1.85–7.62)
NEUTS SEG NFR BLD AUTO: 69 % (ref 43–75)
NITRITE UR QL STRIP: NEGATIVE
NON-SQ EPI CELLS URNS QL MICRO: ABNORMAL /HPF
NRBC BLD AUTO-RTO: 0 /100 WBCS
PH UR STRIP.AUTO: 6.5 [PH]
PLATELET # BLD AUTO: 391 THOUSANDS/UL (ref 149–390)
PMV BLD AUTO: 8.9 FL (ref 8.9–12.7)
POTASSIUM SERPL-SCNC: 3.8 MMOL/L (ref 3.5–5.3)
PROCALCITONIN SERPL-MCNC: <0.05 NG/ML
PROT SERPL-MCNC: 7.9 G/DL (ref 6.4–8.4)
PROT UR STRIP-MCNC: ABNORMAL MG/DL
PROTHROMBIN TIME: 12.8 SECONDS (ref 12.3–15)
RBC # BLD AUTO: 3.94 MILLION/UL (ref 3.88–5.12)
RBC #/AREA URNS AUTO: ABNORMAL /HPF
SODIUM SERPL-SCNC: 137 MMOL/L (ref 135–147)
SP GR UR STRIP.AUTO: 1.02 (ref 1–1.03)
UROBILINOGEN UR STRIP-ACNC: <2 MG/DL
WBC # BLD AUTO: 14.68 THOUSAND/UL (ref 4.31–10.16)
WBC #/AREA URNS AUTO: ABNORMAL /HPF

## 2025-05-05 PROCEDURE — 99284 EMERGENCY DEPT VISIT MOD MDM: CPT

## 2025-05-05 PROCEDURE — 86140 C-REACTIVE PROTEIN: CPT | Performed by: PHYSICIAN ASSISTANT

## 2025-05-05 PROCEDURE — 93005 ELECTROCARDIOGRAM TRACING: CPT

## 2025-05-05 PROCEDURE — 83605 ASSAY OF LACTIC ACID: CPT | Performed by: PHYSICIAN ASSISTANT

## 2025-05-05 PROCEDURE — 85730 THROMBOPLASTIN TIME PARTIAL: CPT | Performed by: PHYSICIAN ASSISTANT

## 2025-05-05 PROCEDURE — 85610 PROTHROMBIN TIME: CPT | Performed by: PHYSICIAN ASSISTANT

## 2025-05-05 PROCEDURE — 81001 URINALYSIS AUTO W/SCOPE: CPT | Performed by: PHYSICIAN ASSISTANT

## 2025-05-05 PROCEDURE — 99284 EMERGENCY DEPT VISIT MOD MDM: CPT | Performed by: PHYSICIAN ASSISTANT

## 2025-05-05 PROCEDURE — 72157 MRI CHEST SPINE W/O & W/DYE: CPT

## 2025-05-05 PROCEDURE — 36415 COLL VENOUS BLD VENIPUNCTURE: CPT | Performed by: PHYSICIAN ASSISTANT

## 2025-05-05 PROCEDURE — 85652 RBC SED RATE AUTOMATED: CPT | Performed by: PHYSICIAN ASSISTANT

## 2025-05-05 PROCEDURE — 85025 COMPLETE CBC W/AUTO DIFF WBC: CPT | Performed by: PHYSICIAN ASSISTANT

## 2025-05-05 PROCEDURE — 96361 HYDRATE IV INFUSION ADD-ON: CPT

## 2025-05-05 PROCEDURE — 71045 X-RAY EXAM CHEST 1 VIEW: CPT

## 2025-05-05 PROCEDURE — 73030 X-RAY EXAM OF SHOULDER: CPT

## 2025-05-05 PROCEDURE — 84145 PROCALCITONIN (PCT): CPT | Performed by: PHYSICIAN ASSISTANT

## 2025-05-05 PROCEDURE — 72158 MRI LUMBAR SPINE W/O & W/DYE: CPT

## 2025-05-05 PROCEDURE — 87040 BLOOD CULTURE FOR BACTERIA: CPT | Performed by: PHYSICIAN ASSISTANT

## 2025-05-05 PROCEDURE — 80053 COMPREHEN METABOLIC PANEL: CPT | Performed by: PHYSICIAN ASSISTANT

## 2025-05-05 RX ORDER — METHOCARBAMOL 500 MG/1
500 TABLET, FILM COATED ORAL ONCE
Status: COMPLETED | OUTPATIENT
Start: 2025-05-05 | End: 2025-05-05

## 2025-05-05 RX ORDER — OXYCODONE HYDROCHLORIDE 5 MG/1
5 TABLET ORAL ONCE
Refills: 0 | Status: COMPLETED | OUTPATIENT
Start: 2025-05-05 | End: 2025-05-05

## 2025-05-05 RX ORDER — IBUPROFEN 600 MG/1
600 TABLET, FILM COATED ORAL ONCE
Status: COMPLETED | OUTPATIENT
Start: 2025-05-05 | End: 2025-05-05

## 2025-05-05 RX ORDER — ACETAMINOPHEN 325 MG/1
975 TABLET ORAL ONCE
Status: COMPLETED | OUTPATIENT
Start: 2025-05-05 | End: 2025-05-05

## 2025-05-05 RX ORDER — DIAZEPAM 2 MG/1
2 TABLET ORAL ONCE
Status: COMPLETED | OUTPATIENT
Start: 2025-05-05 | End: 2025-05-05

## 2025-05-05 RX ADMIN — DIAZEPAM 2 MG: 2 TABLET ORAL at 23:01

## 2025-05-05 RX ADMIN — OXYCODONE HYDROCHLORIDE 5 MG: 5 TABLET ORAL at 22:00

## 2025-05-05 RX ADMIN — METHOCARBAMOL 500 MG: 500 TABLET ORAL at 17:55

## 2025-05-05 RX ADMIN — ACETAMINOPHEN 975 MG: 325 TABLET, FILM COATED ORAL at 19:07

## 2025-05-05 RX ADMIN — IBUPROFEN 600 MG: 600 TABLET ORAL at 19:07

## 2025-05-05 RX ADMIN — SODIUM CHLORIDE 1000 ML: 0.9 INJECTION, SOLUTION INTRAVENOUS at 18:43

## 2025-05-05 NOTE — TELEPHONE ENCOUNTER
Caller: Shannan-Grandmother    Doctor: Dr. Padilla    Reason for call: Patient had surgery on 4/29 and is experiencing a lot of  pain 10/10. She's having muscle spasms in her back and stabbing pain in the right shoulder. Patient wants to know if she can be referred to a rehab center to recover. Please advise    Call back#: [phone]

## 2025-05-05 NOTE — UTILIZATION REVIEW
NOTIFICATION OF ADMISSION DISCHARGE   This is a Notification of Discharge from [facility]. Please be advised that this patient has been discharge from our facility. Below you will find the admission and discharge date and time including the patient’s disposition.   UTILIZATION REVIEW CONTACT:  Utilization Review Assistants  Network Utilization Review Department  Phone: [phone] x carefully listen to the prompts. All voicemails are confidential.  Email: [email or web address]     ADMISSION INFORMATION  PRESENTATION DATE: 4/29/2025  5:54 AM  OBERVATION ADMISSION DATE: N/A  INPATIENT ADMISSION DATE: 4/30/25  8:06 AM   DISCHARGE DATE: 5/2/2025  4:40 PM   DISPOSITION:Home/Self Care    Network Utilization Review Department  ATTENTION: Please call with any questions or concerns to [phone] and carefully listen to the prompts so that you are directed to the right person. All voicemails are confidential.   For Discharge needs, contact Care Management DC Support Team at [phone] opt. 2  Send all requests for admission clinical reviews, approved or denied determinations and any other requests to dedicated fax number below belonging to the campus where the patient is receiving treatment. List of dedicated fax numbers for the Facilities:  FACILITY NAME UR FAX NUMBER   ADMISSION DENIALS (Administrative/Medical Necessity) [phone]   DISCHARGE SUPPORT TEAM ([facility]) [phone]   PARENT CHILD HEALTH (Maternity/NICU/Pediatrics) [phone]   General acute hospital [phone]   [facility] [phone]   [facility] [phone]   General acute hospital [phone]   [facility] [phone]   [facility] [phone]   [facility] [phone]   [facility] [phone]   [facility]  [facility] [phone]   [facility] [phone]   [facility] [phone]   [facility] [phone]

## 2025-05-05 NOTE — TELEPHONE ENCOUNTER
Called and spoke w/pt's grandmother, Shannan and pt on speaker phone and pt had on 4/29/25 robotic assisted posterior spinal fusion with instrumentation T3-L2, autograft/allograft, thoracic apical Leonardo osteotomies T8-T9, T9-T10, T10-T11, T11-T12 (Spine Lumbar) .  Pt is c/o colin down back and R shoulder stabbing pain that was 8/10.  Now is 6-7/10 after receiving Oxycodone 5mg (ordered every 6 hours as needed) at Noon, Acetaminophen 325mg 3 tablets and Ibuprofen 600mg at 1PM (she takes these every 6 hours). She also takes Diazepam 2mg at bedtime that helps her back but not this shoulder pain. Not sure why she is having shoulder pain.  She is voiding ok and has had 2 soft Bms. They are waiting for BSC that should come today. She has been trying to get comfortable by lying on back w/pillows under knees or on side w/pillow between knees. Advised to monitor temp as pt c/o feeling warm. States they did not get AVS. Advised to look in bag of belonging from hospital discharge. Pt states she has them on her phone.     She declined to go to rehab and now doesn't feel that she has enough help at home. She weighs 270# and her grandmother is tiny and her boyfriend works during the day. Is rehab an option? Is Home Health an option? Could they get a hospital bed as it is difficult for grandmother to get her in and out of bed?    Please review and advise.

## 2025-05-05 NOTE — TELEPHONE ENCOUNTER
Caller: Shannan-Grandmother     Doctor: Dr. Padilla     Reason for call: 2nd call /  Patient had surgery on 4/29 and is experiencing a lot of  pain 10/10. She's having muscle spasms in her back and stabbing pain in the right shoulder.     Warm transfer to Nurse Maddy     Call back#: [phone]

## 2025-05-05 NOTE — ED PROVIDER NOTES
"Time reflects when diagnosis was documented in both MDM as applicable and the Disposition within this note       Time User Action Codes Description Comment    5/5/2025 10:28 PM Kerri Garza Add [M54.9] Back pain     5/5/2025 10:28 PM Kerri Garza [M25.511] Right shoulder pain           ED Disposition       None          Assessment & Plan       Medical Decision Making  Patient with worsening back pain s/p T3-L2 spinal fusion, will obtain labs, MRI to r/o abscess.      Amount and/or Complexity of Data Reviewed  Labs: ordered.  Radiology: ordered.    Risk  OTC drugs.  Prescription drug management.        ED Course as of 05/05/25 2306   Mon May 05, 2025   1719 Messaged ortho.     1721 Dr. Frede suggested f/u with Dr. Padilla in the office concerning shoulder pain.    1749 Discussed with radiology, Dr. Broussard, he approves MRI.   1805 Patient will have MRI at 11:30 PM today.    2306 Care transferred to Dr. Cheatham, awaiting MRI.       Medications   methocarbamol (ROBAXIN) tablet 500 mg (500 mg Oral Given 5/5/25 1755)   acetaminophen (TYLENOL) tablet 975 mg (975 mg Oral Given 5/5/25 1907)   ibuprofen (MOTRIN) tablet 600 mg (600 mg Oral Given 5/5/25 1907)   oxyCODONE (ROXICODONE) IR tablet 5 mg (5 mg Oral Given 5/5/25 2200)   diazepam (VALIUM) tablet 2 mg (2 mg Oral Given 5/5/25 2301)   sodium chloride 0.9 % bolus 1,000 mL (0 mL Intravenous Stopped 5/5/25 1943)       ED Risk Strat Scores              CRAFFT      Flowsheet Row Most Recent Value   CRAFFT Initial Screen: During the past 12 months, did you:    1. Drink any alcohol (more than a few sips)?  No Filed at: 05/05/2025 1802   2. Smoke any marijuana or hashish No Filed at: 05/05/2025 1802   3. Use anything else to get high? (\"anything else\" includes illegal drugs, over the counter and prescription drugs, and things that you sniff or 'green')? No Filed at: 05/05/2025 1802              No data recorded                            History of Present Illness "       Chief Complaint   Patient presents with    Back Pain     Comes to ed post spinal fusion sx on the 29th. Patient is in pain and cannot ambulate per patient.        Past Medical History:   Diagnosis Date    Anxiety     Asthma     Depression     Eczema       Past Surgical History:   Procedure Laterality Date    MA REPAIR COMPLEX TRUNK 2.6-7.5 CM N/A 04/29/2025    Procedure: robotic assisted posterior spinal fusion with instrumentation T3-L2, autograft/allograft, thoracic apical Leonardo osteotomies T8-T9, T9-T10, T10-T11, T11-T12;  Surgeon: Joseph Padilla MD;  Location[facility];  Service: Orthopedics    SPINAL FUSION  04/29/2025    WISDOM TOOTH EXTRACTION        Family History   Problem Relation Age of Onset    No Known Problems Father     Hashimoto's thyroiditis Brother     Liver cancer Maternal Grandfather     Lung cancer Maternal Grandfather       Social History     Tobacco Use    Smoking status: Every Day     Types: Cigarettes    Smokeless tobacco: Current    Tobacco comments:     Smokes 1-2 cigarettes per day and vapes daily     Vaping Use    Vaping status: Every Day    Substances: Nicotine, THC, Flavoring   Substance Use Topics    Alcohol use: Not Currently    Drug use: Yes     Types: Marijuana, Psilocybin     Comment: daily marijuana; mushroom(has not used for over a year)      E-Cigarette/Vaping    E-Cigarette Use Current Every Day User       E-Cigarette/Vaping Substances    Nicotine Yes     THC Yes     CBD No     Flavoring Yes     Other No     Unknown No       I have reviewed and agree with the history as documented.     Patient is a 18 y/o F that presents to the ED with back pain and right shoulder pain s/p spinal fusion of T3-L2 on 4/29 by Dr. Padilla.  She states she was discharged from the hospital 3 days ago and was feeling fine, but over the past couple days her back pain has been worsening and she developed right shoulder pain.  She denies trauma to her shoulder, but has been pulling  herself up with her arm to sit up.  Pain in shoulder is worse with movement.  SHe does not have a thermometer at home, so has not been checking her temps.  She has not removed the bandage from her back, but denies any surrounding erythema.  No numbness or weakness.  She denies bowel/bladder dysfunction. She has been taking oxycodone, tylenol/motrin and valium for the pain.       History provided by:  Patient  Back Pain  Associated symptoms: no abdominal pain, no dysuria, no numbness and no weakness        Review of Systems   Constitutional:  Positive for chills.   HENT: Negative.     Respiratory:  Negative for cough and shortness of breath.    Gastrointestinal:  Negative for abdominal pain, constipation, diarrhea, nausea and vomiting.   Genitourinary:  Negative for decreased urine volume and dysuria.   Musculoskeletal:  Positive for back pain.        Right shoulder pain   Skin:  Negative for color change and pallor.   Neurological:  Negative for dizziness, weakness and numbness.   Psychiatric/Behavioral:  Negative for confusion.    All other systems reviewed and are negative.          Objective       ED Triage Vitals [05/05/25 1620]   Temperature Pulse Blood Pressure Respirations SpO2 Patient Position - Orthostatic VS   98.2 °F (36.8 °C) (!) 124 164/98 16 99 % Sitting      Temp Source Heart Rate Source BP Location FiO2 (%) Pain Score    Temporal Monitor Right arm -- 10 - Worst Possible Pain      Vitals      Date and Time Temp Pulse SpO2 Resp BP Pain Score FACES Pain Rating User   05/05/25 2230 -- 94 98 % 18 134/67 -- -- MG   05/05/25 2215 -- 87 98 % 18 127/58 -- -- MG   05/05/25 2200 -- -- -- -- -- 5 -- MG   05/05/25 2130 -- 89 99 % 18 152/71 -- -- MG   05/05/25 2100 -- 94 99 % 18 147/75 -- -- MG   05/05/25 2030 -- 87 99 % 18 146/64 -- -- MG   05/05/25 2000 -- 87 100 % 18 156/73 -- -- MG   05/05/25 1915 -- 90 100 % 18 116/55 -- -- MG   05/05/25 1914 -- -- 99 % -- -- -- -- MG   05/05/25 1907 -- -- -- -- -- 5 -- MG    05/05/25 1900 -- 91 99 % 18 120/68 -- -- MG   05/05/25 1715 -- 94 97 % 20 128/60 -- -- AM   05/05/25 1656 -- 91 -- -- -- -- -- CMD   05/05/25 1620 98.2 °F (36.8 °C) 124 99 % 16 164/98 10 - Worst Possible Pain -- BY            Physical Exam  Vitals and nursing note reviewed.   Constitutional:       General: She is not in acute distress.     Appearance: Normal appearance. She is well-developed and well-groomed. She is not ill-appearing or diaphoretic.   HENT:      Head: Normocephalic and atraumatic.      Right Ear: External ear normal.      Left Ear: External ear normal.      Nose: Nose normal.      Mouth/Throat:      Mouth: Mucous membranes are moist.   Eyes:      Conjunctiva/sclera: Conjunctivae normal.   Cardiovascular:      Rate and Rhythm: Normal rate and regular rhythm.      Heart sounds: Normal heart sounds.   Pulmonary:      Effort: Pulmonary effort is normal.      Breath sounds: Normal breath sounds.   Musculoskeletal:      Cervical back: Normal range of motion.      Right lower leg: No edema.      Left lower leg: No edema.      Comments: Surgical incision healing well, steristrips in place.  No erythema   Skin:     General: Skin is warm and dry.   Neurological:      Mental Status: She is alert and oriented to person, place, and time.      Sensory: Sensation is intact.      Motor: Motor function is intact. No weakness.   Psychiatric:         Behavior: Behavior is cooperative.         Results Reviewed       Procedure Component Value Units Date/Time    Blood culture #1 [[id number]] Collected: 05/05/25 1705    Lab Status: Preliminary result Specimen: Blood from Hand, Left Updated: 05/05/25 2136     Blood Culture Received in Microbiology Lab. Culture in Progress.    Blood culture #2 [[id number]] Collected: 05/05/25 1704    Lab Status: Preliminary result Specimen: Blood from Arm, Right Updated: 05/05/25 2135     Blood Culture Received in Microbiology Lab. Culture in Progress.    Procalcitonin [[id number]]  (Normal)  Collected: 05/05/25 1704    Lab Status: Final result Specimen: Blood from Arm, Right Updated: 05/05/25 1739     Procalcitonin <0.05 ng/ml     Lactic acid, plasma (w/reflex if result > 2.0) [[id number]]  (Normal) Collected: 05/05/25 1704    Lab Status: Final result Specimen: Blood from Arm, Right Updated: 05/05/25 1729     LACTIC ACID 1.2 mmol/L     Narrative:      Result may be elevated if tourniquet was used during collection.    Comprehensive metabolic panel [[id number]]  (Abnormal) Collected: 05/05/25 1704    Lab Status: Final result Specimen: Blood from Arm, Right Updated: 05/05/25 1729     Sodium 137 mmol/L      Potassium 3.8 mmol/L      Chloride 102 mmol/L      CO2 25 mmol/L      ANION GAP 10 mmol/L      BUN 9 mg/dL      Creatinine 0.56 mg/dL      Glucose 101 mg/dL      Calcium 9.5 mg/dL      AST 22 U/L      ALT 19 U/L      Alkaline Phosphatase 62 U/L      Total Protein 7.9 g/dL      Albumin 4.3 g/dL      Total Bilirubin 0.43 mg/dL      eGFR --    Narrative:      Notes:     1. eGFR calculation is only valid for adults 18 years and older.  2. EGFR calculation cannot be performed for patients who are transgender, non-binary, or whose legal sex, sex at birth, and gender identity differ.    C-reactive protein [[id number]]  (Abnormal) Collected: 05/05/25 1704    Lab Status: Final result Specimen: Blood from Arm, Right Updated: 05/05/25 1729     CRP 45.5 mg/L     Protime-INR [[id number]]  (Normal) Collected: 05/05/25 1704    Lab Status: Final result Specimen: Blood from Arm, Right Updated: 05/05/25 1726     Protime 12.8 seconds      INR 0.91    Narrative:      INR Therapeutic Range    Indication                                             INR Range      Atrial Fibrillation                                               2.0-3.0  Hypercoagulable State                                    2.0.2.3  Left Ventricular Asist Device                            2.0-3.0  Mechanical Heart Valve                                  -     Aortic(with afib, MI, embolism, HF, LA enlargement,    and/or coagulopathy)                                     2.0-3.0 (2.5-3.5)     Mitral                                                             2.5-3.5  Prosthetic/Bioprosthetic Heart Valve               2.0-3.0  Venous thromboembolism (VTE: VT, PE        2.0-3.0    APTT [[id number]]  (Normal) Collected: 05/05/25 1704    Lab Status: Final result Specimen: Blood from Arm, Right Updated: 05/05/25 1726     PTT 29 seconds     Sedimentation rate, automated [[id number]]  (Abnormal) Collected: 05/05/25 1704    Lab Status: Final result Specimen: Blood from Arm, Right Updated: 05/05/25 1723     Sed Rate 66 mm/hour     CBC and differential [[id number]]  (Abnormal) Collected: 05/05/25 1704    Lab Status: Final result Specimen: Blood from Arm, Right Updated: 05/05/25 1713     WBC 14.68 Thousand/uL      RBC 3.94 Million/uL      Hemoglobin 11.7 g/dL      Hematocrit 36.3 %      MCV 92 fL      MCH 29.7 pg      MCHC 32.2 g/dL      RDW 12.5 %      MPV 8.9 fL      Platelets 391 Thousands/uL      nRBC 0 /100 WBCs      Segmented % 69 %      Immature Grans % 2 %      Lymphocytes % 18 %      Monocytes % 8 %      Eosinophils Relative 2 %      Basophils Relative 1 %      Absolute Neutrophils 10.19 Thousands/µL      Absolute Immature Grans 0.25 Thousand/uL      Absolute Lymphocytes 2.62 Thousands/µL      Absolute Monocytes 1.20 Thousand/µL      Eosinophils Absolute 0.33 Thousand/µL      Basophils Absolute 0.09 Thousands/µL     UA w Reflex to Microscopic w Reflex to Culture [[id number]]     Lab Status: No result Specimen: Urine, Clean Catch             XR chest 1 view portable   Final Interpretation by Bryn Sierra DO (05/05 1729)      No acute cardiopulmonary disease.            Workstation performed: [id number]         XR shoulder 2+ views RIGHT   Final Interpretation by Bryn Sierra DO (05/05 1728)      No acute osseous abnormality.         Computerized Assisted  Algorithm (CAA) may have been used to analyze all applicable images.         Workstation performed: [id number]         MRI lumbar spine w wo contrast    (Results Pending)   MRI thoracic spine w wo contrast    (Results Pending)       ECG 12 Lead Documentation Only    Date/Time: 5/5/2025 5:15 PM    Performed by: Kerri Garza PA-C  Authorized by: Kerri Garza PA-C    Indications / Diagnosis:  Tachycardia  ECG reviewed by me, the ED Provider: yes    Patient location:  ED  Previous ECG:     Previous ECG:  Compared to current    Similarity:  No change  Rate:     ECG rate:  84  Rhythm:     Rhythm: sinus rhythm    Conduction:     Conduction: normal    ST segments:     ST segments:  Normal  T waves:     T waves: non-specific        ED Medication and Procedure Management   Prior to Admission Medications   Prescriptions Last Dose Informant Patient Reported? Taking?   Cholecalciferol (VITAMIN D3) 1,000 units tablet   No No   Sig: Take 2 tablets (2,000 Units total) by mouth daily   acetaminophen (TYLENOL) 325 mg tablet   No No   Sig: Take 3 tablets (975 mg total) by mouth every 6 (six) hours   diazepam (VALIUM) 2 mg tablet   No No   Sig: Take 1 tablet (2 mg total) by mouth daily at bedtime as needed for muscle spasms for up to 14 days   ibuprofen (MOTRIN) 600 mg tablet   No No   Sig: Take 1 tablet (600 mg total) by mouth every 6 (six) hours   oxyCODONE (Roxicodone) 5 immediate release tablet   No No   Sig: Take 1 tablet (5 mg total) by mouth every 6 (six) hours as needed for moderate pain for up to 11 days Max Daily Amount: 20 mg      Facility-Administered Medications: None     Patient's Medications   Discharge Prescriptions    No medications on file     No discharge procedures on file.  ED SEPSIS DOCUMENTATION   Time reflects when diagnosis was documented in both MDM as applicable and the Disposition within this note       Time User Action Codes Description Comment    5/5/2025 10:28 PM Kerri Garza  Add [M54.9] Back pain     5/5/2025 10:28 PM Kerri Garza Add [M25.511] Right shoulder pain                  Kerri Garza PA-C  05/05/25 7321

## 2025-05-06 ENCOUNTER — APPOINTMENT (EMERGENCY)
Dept: CT IMAGING | Facility: HOSPITAL | Age: 20
End: 2025-05-06
Payer: COMMERCIAL

## 2025-05-06 VITALS
OXYGEN SATURATION: 99 % | SYSTOLIC BLOOD PRESSURE: 138 MMHG | DIASTOLIC BLOOD PRESSURE: 77 MMHG | RESPIRATION RATE: 18 BRPM | WEIGHT: 268 LBS | HEART RATE: 103 BPM | BODY MASS INDEX: 43.26 KG/M2 | TEMPERATURE: 98.2 F

## 2025-05-06 LAB
ATRIAL RATE: 84 BPM
DME PARACHUTE DELIVERY DATE ACTUAL: NORMAL
DME PARACHUTE DELIVERY DATE ACTUAL: NORMAL
DME PARACHUTE DELIVERY DATE REQUESTED: NORMAL
DME PARACHUTE DELIVERY DATE REQUESTED: NORMAL
DME PARACHUTE ITEM DESCRIPTION: NORMAL
DME PARACHUTE ITEM DESCRIPTION: NORMAL
DME PARACHUTE ORDER STATUS: NORMAL
DME PARACHUTE ORDER STATUS: NORMAL
DME PARACHUTE SUPPLIER NAME: NORMAL
DME PARACHUTE SUPPLIER NAME: NORMAL
DME PARACHUTE SUPPLIER PHONE: NORMAL
DME PARACHUTE SUPPLIER PHONE: NORMAL
P AXIS: 38 DEGREES
PR INTERVAL: 136 MS
QRS AXIS: 60 DEGREES
QRSD INTERVAL: 94 MS
QT INTERVAL: 358 MS
QTC INTERVAL: 423 MS
T WAVE AXIS: -6 DEGREES
VENTRICULAR RATE: 84 BPM

## 2025-05-06 PROCEDURE — 96374 THER/PROPH/DIAG INJ IV PUSH: CPT

## 2025-05-06 PROCEDURE — 71250 CT THORAX DX C-: CPT

## 2025-05-06 PROCEDURE — A9585 GADOBUTROL INJECTION: HCPCS | Performed by: EMERGENCY MEDICINE

## 2025-05-06 PROCEDURE — 93010 ELECTROCARDIOGRAM REPORT: CPT | Performed by: INTERNAL MEDICINE

## 2025-05-06 PROCEDURE — 81025 URINE PREGNANCY TEST: CPT | Performed by: EMERGENCY MEDICINE

## 2025-05-06 RX ORDER — IBUPROFEN 600 MG/1
600 TABLET, FILM COATED ORAL ONCE
Status: COMPLETED | OUTPATIENT
Start: 2025-05-06 | End: 2025-05-06

## 2025-05-06 RX ORDER — LORAZEPAM 2 MG/ML
0.5 INJECTION INTRAMUSCULAR ONCE
Status: COMPLETED | OUTPATIENT
Start: 2025-05-06 | End: 2025-05-06

## 2025-05-06 RX ORDER — GADOBUTROL 604.72 MG/ML
12 INJECTION INTRAVENOUS
Status: COMPLETED | OUTPATIENT
Start: 2025-05-06 | End: 2025-05-06

## 2025-05-06 RX ORDER — ACETAMINOPHEN 325 MG/1
975 TABLET ORAL ONCE
Status: COMPLETED | OUTPATIENT
Start: 2025-05-06 | End: 2025-05-06

## 2025-05-06 RX ADMIN — IBUPROFEN 600 MG: 600 TABLET ORAL at 02:22

## 2025-05-06 RX ADMIN — ACETAMINOPHEN 975 MG: 325 TABLET, FILM COATED ORAL at 02:22

## 2025-05-06 RX ADMIN — LORAZEPAM 0.5 MG: 2 INJECTION INTRAMUSCULAR; INTRAVENOUS at 01:24

## 2025-05-06 RX ADMIN — GADOBUTROL 12 ML: 604.72 INJECTION INTRAVENOUS at 00:30

## 2025-05-06 NOTE — ED CARE HANDOFF
Emergency Department Sign Out Note        Sign out and transfer of care from Julianne QURESHI. See Separate Emergency Department note.     The patient, Luciana Orozco, was evaluated by the previous provider for back pain.          No data recorded                          ED Course as of 05/06/25 0252   Tue May 06, 2025   0042 Patient received in signout from Julianne Garza, here for back pain after spinal fusion surgery, case discussed with Dr. Carmichael with plans to follow-up with Dr. Padilla in the office, had some lab work concerning for infection with elevated sed rate and CRP as well as elevated WBC however he is afebrile, has no significant chest pain or trouble breathing, imaging with possible cavitary lesion in the left lung apex of unclear significance however no chest pain no trouble breathing she has no significant pulmonary symptoms at this point..  Will obtain CT chest to further evaluate given MRI recommendations and she will follow-up with PCP for further evaluation and care   0053 Patient has no significant pulmonary symptoms no fever, no cough no shortness of breath, no recent upper respiratory infections or pneumonia she has no known exposure to tuberculosis no cough no night sweats no weight loss, per mom had a negative PPD previously low suspicion for TB plan to obtain imaging as recommended on MRI read of CT chest to further evaluate the lung finding and patient will likely follow-up with PCP for further evaluation and care   0057 Patient placed in N95 mask for precaution   0251 CT with small cavitary focus likely in setting of scarring, there is no significant infectious symptoms will have patient follow-up with PCP for further evaluation otherwise stable for discharge no indication further inpatient evaluation at this time     Procedures  Medical Decision Making  Amount and/or Complexity of Data Reviewed  Labs: ordered.  Radiology: ordered.    Risk  OTC drugs.  Prescription drug  management.            Disposition  Final diagnoses:   Back pain   Right shoulder pain   Abnormal CT of the chest     Time reflects when diagnosis was documented in both MDM as applicable and the Disposition within this note       Time User Action Codes Description Comment    5/5/2025 10:28 PM Kerri Garza [M54.9] Back pain     5/5/2025 10:28 PM Kerri Garza Add [M25.511] Right shoulder pain     5/6/2025  2:51 AM Ruth Cheatham [R93.89] Abnormal CT of the chest           ED Disposition       ED Disposition   Discharge    Condition   Stable    Date/Time   Tue May 6, 2025 12:29 AM    Comment   Luciana Orozco discharge to home/self care.                   Follow-up Information       Follow up With Specialties Details Why Contact Info Additional Information    Joseph Padilla MD Orthopedic Surgery, Pediatric Orthopedic Surgery Call in 1 day For recheck [address] [facility] 2nd floor  [facility] [address]  [phone]        [facility] Emergency Medicine  If symptoms worsen [address] [facility] [address] [phone] [facility], [address] [place], [place] [address]    Bonita Cuellar MD Pediatrics Schedule an appointment as soon as possible for a visit   [address] [place] [address] [phone]             Patient's Medications   Discharge Prescriptions    No medications on file     No discharge procedures on file.       ED Provider  Electronically Signed by     Ruth Cheatham DO  05/06/25 0252

## 2025-05-06 NOTE — DISCHARGE INSTRUCTIONS
Continue your current medications.  Follow up with Dr. Padilla for recheck.  Return to ER if symptoms worsen.     You had some likely scarring in your left upper lung if you develop trouble breathing, significant cough or fever please return for evaluation otherwise please follow-up with your primary care provider for repeat imaging to ensure no progression

## 2025-05-08 ENCOUNTER — TELEMEDICINE (OUTPATIENT)
Dept: BEHAVIORAL/MENTAL HEALTH CLINIC | Facility: CLINIC | Age: 20
End: 2025-05-08
Payer: COMMERCIAL

## 2025-05-08 DIAGNOSIS — F41.1 GENERALIZED ANXIETY DISORDER: Primary | ICD-10-CM

## 2025-05-08 DIAGNOSIS — F31.60 BIPOLAR AFFECTIVE DISORDER, CURRENT EPISODE MIXED, CURRENT EPISODE SEVERITY UNSPECIFIED (HCC): ICD-10-CM

## 2025-05-08 PROCEDURE — 90834 PSYTX W PT 45 MINUTES: CPT
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
## 2025-05-10 LAB
BACTERIA BLD CULT: NORMAL
BACTERIA BLD CULT: NORMAL

## 2025-05-12 ENCOUNTER — OFFICE VISIT (OUTPATIENT)
Dept: OBGYN CLINIC | Facility: HOSPITAL | Age: 20
End: 2025-05-12

## 2025-05-12 DIAGNOSIS — M42.00 SCHEURMANN'S DISEASE: Primary | ICD-10-CM

## 2025-05-12 PROCEDURE — 99024 POSTOP FOLLOW-UP VISIT: CPT | Performed by: ORTHOPAEDIC SURGERY

## 2025-05-12 NOTE — PROGRESS NOTES
SUBJECTIVE  Here for follow up s/p PSFI T3-L2, 2 weeks from procedure. States that she is doing better, and that her initial hump of pain has subsided. She was seen in the ED on 5/5/25 for increasing back pain, had CT and MRIs done.     Except as noted above:  no further complaints  no red flags    OBJECTIVE/EXAM  no signs of infection  No skin issues - healing well  ROM per protocol   Incision healed       Imaging:  any newly obtained images reviewed and discussed with patient/family  CT chest - concern for possible broken L T11 screw in safe position    Plan:  Follow up in 4 weeks   Next visit obtain following XRs: xr scoli PA/lat   Additional instructions / restrictions: Continue with protocol. Nylon sutures on distal incision removed. Instructed to monitor incision and continue keeping clean and dry.  Discussed possibility of broken screw and that we will continue to observe.     All patient/family questions were addressed.         Bottom line:  She is doing well  Substantial improvement after initial postop pain  She was in ER 5/5, details of that visit were reviewed  They called on call ortho  A CT chest and MRI T/L spine were done  Labs demonstrated roughly same WBC as postop and CRP 40's which could be consistent with postop elevation and patient denies symptoms of infection stating she's improved substantially since then  Her incision is healed well, sutures removed    CT with possible LEFT T11 broken screw in safe position  Her posture is excellent today  She's smiling  No fluctuance or fluid wave on back    F/u 4 weeks  XR scoli PA/lat  Continue vit D then multivitamin while healing

## 2025-05-13 ENCOUNTER — TELEPHONE (OUTPATIENT)
Dept: PSYCHIATRY | Facility: CLINIC | Age: 20
End: 2025-05-13

## 2025-05-14 ENCOUNTER — TELEPHONE (OUTPATIENT)
Dept: PSYCHIATRY | Facility: CLINIC | Age: 20
End: 2025-05-14

## 2025-05-14 NOTE — TELEPHONE ENCOUNTER
LVM for client letting her know that her [facility] apt was rescheduled with another provider due to conflict of interest.  Apt Date and time remain the same.

## 2025-05-22 ENCOUNTER — TELEPHONE (OUTPATIENT)
Age: 20
End: 2025-05-22

## 2025-05-22 ENCOUNTER — TELEMEDICINE (OUTPATIENT)
Dept: BEHAVIORAL/MENTAL HEALTH CLINIC | Facility: CLINIC | Age: 20
End: 2025-05-22
Payer: COMMERCIAL

## 2025-05-22 DIAGNOSIS — F31.60 BIPOLAR AFFECTIVE DISORDER, CURRENT EPISODE MIXED, CURRENT EPISODE SEVERITY UNSPECIFIED (HCC): ICD-10-CM

## 2025-05-22 DIAGNOSIS — F41.1 GENERALIZED ANXIETY DISORDER: Primary | ICD-10-CM

## 2025-05-22 DIAGNOSIS — R45.89 THOUGHTS OF SELF HARM: ICD-10-CM

## 2025-05-22 PROCEDURE — 90834 PSYTX W PT 45 MINUTES: CPT

## 2025-05-22 NOTE — PSYCH
"Behavioral Health Psychotherapy Progress Note    Psychotherapy Provided: Individual Psychotherapy     1. Generalized anxiety disorder        2. Thoughts of self harm        3. Bipolar affective disorder, current episode mixed, current episode severity unspecified (HCC)            Goals addressed in session: Goal 1     DATA: Client and therapist met virtually for session. Client shared updates on how she was doing, how she was healing and feeling. Client shared that she has lost weight too and is feeling good about herself too. Client shared about her healing process, feeling well and has her 6 week post-op appointment. Client shared feeling better than even before surgery. Therapist discussed about her family, boyfriend and friends' support they have been giving to her. Client and therapist also discussed about next therapist transitioning to.   During this session, this clinician used the following therapeutic modalities: Client-centered Therapy    Substance Abuse was not addressed during this session. If the client is diagnosed with a co-occurring substance use disorder, please indicate any changes in the frequency or amount of use: . Stage of change for addressing substance use diagnoses: No substance use/Not applicable    ASSESSMENT:  Luciana Orozco presents with a Euthymic/ normal mood.     her affect is Normal range and intensity, which is congruent, with her mood and the content of the session. The client has made progress on their goals.     Luciana Orozco presents with a none risk of suicide, none risk of self-harm, and none risk of harm to others.    For any risk assessment that surpasses a \"low\" rating, a safety plan must be developed.    A safety plan was indicated: no  If yes, describe in detail on file    PLAN: Between sessions, Luciana Orozco will continue to practice self-care. At the next session, the therapist will use Client-centered Therapy to address healing, anxiety.    Behavioral " Health Treatment Plan and Discharge Planning: Luciana Orozco is aware of and agrees to continue to work on their treatment plan. They have identified and are working toward their discharge goals. yes    Depression Follow-up Plan Completed: No    Visit start and stop times:    2025  Start Time: 1106  Stop Time: 1157  Total Visit Time: 51 minutesVirtual Regular VisitName: Luciana Orozco      : 2005      MRN: [id number]  Encounter Provider: Patricia Aragon  Encounter Date: 2025   Encounter department: [facility] THERAPIST MENTAL HEALTH OUTPATIENT  :  Assessment & Plan  Generalized anxiety disorder         Thoughts of self harm         Bipolar affective disorder, current episode mixed, current episode severity unspecified (HCC)               Reason for visit is No chief complaint on file.     Recent Visits  Date Type Provider Dept   25 Telemedicine Patricia Aragon [facility] Therapist [facility]   Showing recent visits within past 7 days and meeting all other requirements  Future Appointments  No visits were found meeting these conditions.  Showing future appointments within next 150 days and meeting all other requirements     History of Present Illness     HPI    Past Medical History   Past Medical History[1]  Past Surgical History[2]  Current Outpatient Medications   Medication Instructions    acetaminophen (TYLENOL) 975 mg, Oral, Every 6 hours scheduled    Cholecalciferol (VITAMIN D3) 2,000 Units, Oral, Daily    diazepam (VALIUM) 2 mg, Oral, Daily at bedtime PRN    ibuprofen (MOTRIN) 600 mg, Oral, Every 6 hours scheduled     Allergies[3]    Objective   LMP 2025 (Approximate)     Video Exam  Physical Exam     Administrative Statements   Encounter provider Patricia Aragon    The Patient is located at Home and in the following state in which I hold an active license [place].    The patient was identified by name and date of birth. Luciana Orozco was informed that this  is a telemedicine visit and that the visit is being conducted through the Epic Embedded platform. She agrees to proceed..  My office door was closed. No one else was in the room.  She acknowledged consent and understanding of privacy and security of the video platform. The patient has agreed to participate and understands they can discontinue the visit at any time.           [1]   Past Medical History:  Diagnosis Date    Anxiety     Asthma     Depression     Eczema    [2]   Past Surgical History:  Procedure Laterality Date    IL REPAIR COMPLEX TRUNK 2.6-7.5 CM N/A 04/29/2025    Procedure: robotic assisted posterior spinal fusion with instrumentation T3-L2, autograft/allograft, thoracic apical Leonardo osteotomies T8-T9, T9-T10, T10-T11, T11-T12;  Surgeon: Joseph Padilla MD;  Location: [facility];  Service: Orthopedics    SPINAL FUSION  04/29/2025    WISDOM TOOTH EXTRACTION     [3]   Allergies  Allergen Reactions    Benadryl [Diphenhydramine] Hyperactivity    Mangifera Indica Itching     Edgar     Pineapple - Food Allergy Itching

## 2025-05-22 NOTE — TELEPHONE ENCOUNTER
Caller: Luciana    Doctor: Dr. Padilla    Reason for call: patient had SX on 4/29/25  for a back fusion Her steri strips are growing into her incision . Patient is going to upload a picture .  Please advise if the steri strips can be removed  Thank you    Call back#: [phone]

## 2025-05-22 NOTE — TELEPHONE ENCOUNTER
Called and left voicemail for pt and relayed SHAGGY Melendez's message, advised to call back with any questions.

## 2025-05-29 ENCOUNTER — SOCIAL WORK (OUTPATIENT)
Dept: BEHAVIORAL/MENTAL HEALTH CLINIC | Facility: CLINIC | Age: 20
End: 2025-05-29
Payer: COMMERCIAL

## 2025-05-29 DIAGNOSIS — F41.1 GENERALIZED ANXIETY DISORDER: Primary | ICD-10-CM

## 2025-05-29 DIAGNOSIS — F31.60 BIPOLAR AFFECTIVE DISORDER, CURRENT EPISODE MIXED, CURRENT EPISODE SEVERITY UNSPECIFIED (HCC): ICD-10-CM

## 2025-05-29 PROCEDURE — 90837 PSYTX W PT 60 MINUTES: CPT

## 2025-05-29 NOTE — PSYCH
"Behavioral Health Psychotherapy Progress Note    Psychotherapy Provided: Individual Psychotherapy     1. Generalized anxiety disorder        2. Bipolar affective disorder, current episode mixed, current episode severity unspecified (HCC)            Goals addressed in session: Goal 1     DATA: Client and therapist met in office for last session together. Client and therapist continued conversation about her surgery and how she was feeling. Client shared she was doing better and feeling a lot better than before. Client shared about growing two inches as well as limitations or not having any limitations currently. Client and therapist discussed that her relationship is going really well, plans for schooling for nails, friendships, and things going on with family. Discussed about scheduled session with next therapist too. Therapist wished client well.   During this session, this clinician used the following therapeutic modalities: Client-centered Therapy    Substance Abuse was not addressed during this session. If the client is diagnosed with a co-occurring substance use disorder, please indicate any changes in the frequency or amount of use: . Stage of change for addressing substance use diagnoses: No substance use/Not applicable    ASSESSMENT:  Luciana Orozco presents with a Euthymic/ normal mood.     her affect is Normal range and intensity, which is congruent, with her mood and the content of the session. The client has made progress on their goals.     Luciana Orozco presents with a none risk of suicide, none risk of self-harm, and none risk of harm to others.    For any risk assessment that surpasses a \"low\" rating, a safety plan must be developed.    A safety plan was indicated: no  If yes, describe in detail on file    PLAN: Between sessions, Luciana Orozco will continue to practice coping skills/self-care. At the next session, the therapist will use Client-centered Therapy to address " anxiety.    Behavioral Health Treatment Plan and Discharge Planning: Luciana Orozco is aware of and agrees to continue to work on their treatment plan. They have identified and are working toward their discharge goals. yes    Depression Follow-up Plan Completed: No    Visit start and stop times:    05/29/2025  Start Time: 1101  Stop Time: 1159  Total Visit Time: 58 minutes

## 2025-06-03 ENCOUNTER — DOCUMENTATION (OUTPATIENT)
Dept: BEHAVIORAL/MENTAL HEALTH CLINIC | Facility: CLINIC | Age: 20
End: 2025-06-03

## 2025-06-03 DIAGNOSIS — M42.00 SCHEURMANN'S DISEASE: Primary | ICD-10-CM

## 2025-06-03 NOTE — PROGRESS NOTES
TRANSFER SUMMARY    [facility]    Patient Name Luciana Orozco     Date of Birth: 19 y.o. 2005      MRN: [id number]    Admission Date: 11/21/2024    Date of Transfer: Hilda 3, 2025    Admission Diagnosis:     Generalized Anxiety Disorder  ADHD, Innatentive Type    Current Diagnosis:     No diagnosis found.    Reason for Admission: Luciana presented for treatment due to anxiety symptoms. Primary complaints included ANXIETY SYMPTOMS: daily anxiety symptoms, feeling nervous, poor concentration, anxiety in social situations.     Progress in Treatment: Luciana was seen for Individual Couseling. During the course of treatment she has made good progress but would benefit from continuation of therapy.    Episodes of Higher Level of Care: No    Transfer request Initiated by: Psychiatrist:  Therapist: Patricia Rudolph    Reason for Transfer Request: clinician leaving practice    Does this individual need a clinician with specialized training/expertise?: No    [facility] this client [facility]? Psychiatrist:  Therapist:     Other pertinent issues: None    Are there any specific individuals who would be a “best fit” or who have already agreed to accept this transfer request?      Psychiatrist:    Therapist: Hortencia Holm  Rationale: Patient prefers female clinician    Attempts to maintain the current therapeutic relationship: Not Applicable    Transfer request routed to Clinical Supervisor for input and/or approval.     Comments from other involved providers and/or clinical coordinator : Not Applicable    Patricia Aragon06/03/25

## 2025-06-09 ENCOUNTER — HOSPITAL ENCOUNTER (OUTPATIENT)
Dept: RADIOLOGY | Facility: HOSPITAL | Age: 20
Discharge: HOME | End: 2025-06-09
Attending: ORTHOPAEDIC SURGERY
Payer: COMMERCIAL

## 2025-06-09 ENCOUNTER — OFFICE VISIT (OUTPATIENT)
Dept: OBGYN CLINIC | Facility: HOSPITAL | Age: 20
End: 2025-06-09

## 2025-06-09 DIAGNOSIS — E55.9 VITAMIN D DEFICIENCY: ICD-10-CM

## 2025-06-09 DIAGNOSIS — M42.00 SCHEURMANN'S DISEASE: ICD-10-CM

## 2025-06-09 PROCEDURE — 99024 POSTOP FOLLOW-UP VISIT: CPT | Performed by: ORTHOPAEDIC SURGERY

## 2025-06-09 PROCEDURE — 72082 X-RAY EXAM ENTIRE SPI 2/3 VW: CPT

## 2025-06-09 NOTE — PROGRESS NOTES
SUBJECTIVE  Patient is here for follow up s/p PSFI T3-L2, 6 weeks from procedure. States that she is doing better. She has minimal pain. She reports occasional muscles spasms but has been getting better since procedure. She and her mother are overall pleased with her progress.    Except as noted above:  no further complaints  no red flags    OBJECTIVE/EXAM  no signs of infection  No skin issues - healing well  ROM as expected        XRs:  any newly obtained images reviewed and discussed with patient/family  Scoli XR: s/p  PSFI T3-L2 with adequate alignment of screws and implants.    Assessment & Plan  Vitamin D deficiency    Orders:    Cholecalciferol (VITAMIN D3) 1,000 units tablet; Take 2 tablets (2,000 Units total) by mouth daily         Plan:  Follow up in 6 weeks for repeat scoliosis XR.  Additional instructions / restrictions: no changes to BLT restrictions  Vitamin D tablets were prescribed to patient today's visit.    All patient/family questions were addressed.      Scribe Attestation      I,:  Wilder Kelly am acting as a scribe while in the presence of the attending physician.:       I,:  Joseph Padilla MD personally performed the services described in this documentation    as scribed in my presence.:

## 2025-06-09 NOTE — PATIENT INSTRUCTIONS
ACTIVITY 1 week 1 month 3 months 6 months  Shower Yes     Walking Yes     Swimming No Yes    Lifting 10-20 lbs. (book bag) No Yes    Light shoulder/arm exercise No Yes    Driving No Yes    School No Yes    Treadmill/stationary bike No Yes    Dance / Yoga No when comfortable   Bicycling No No Yes   Light jogging No No Yes   Easy gym class No No Yes   Non-contact sports No No Yes   Skating No No Yes   Lifting more than 20 lbs. No No Yes   Horse riding - no jumping No No Yes   Surfing No No Yes   Skiing - water & snow No No No Yes  Bowling No No No Yes  Amusement park rides No No No Yes  Gymnastics No No No Yes  Parachuting No No No Yes  Dirt bike racing No No No Yes  Contact sports No No No Yes  Rollerblading No No No Yes  Skateboarding No No No Yes  Snowboarding              No No No Yes

## 2025-06-18 ENCOUNTER — OFFICE VISIT (OUTPATIENT)
Dept: BEHAVIORAL/MENTAL HEALTH CLINIC | Facility: CLINIC | Age: 20
End: 2025-06-18
Payer: COMMERCIAL

## 2025-06-18 DIAGNOSIS — F41.1 GENERALIZED ANXIETY DISORDER: Primary | ICD-10-CM

## 2025-06-18 PROCEDURE — 90837 PSYTX W PT 60 MINUTES: CPT

## 2025-06-18 NOTE — PSYCH
Behavioral Health Psychotherapy Progress Note    Psychotherapy Provided: Individual Psychotherapy     1. Generalized anxiety disorder            Goals addressed in session: Goal 1     DATA: Client presented for an in-person session. Client is a transfer from another clinician who left department. Client and clinician spent the duration of session building rapport. Client shared that she wanted to work on emotion regulation, potentially connect to a psychiatrist for medication management, discuss current diagnoses of anxiety, depression, bipolar and ADD. Client disclosed that she recently had back surgery and how the recovery has been difficult. She talked in-length around her boyfriend and some challenges she has been dealing with. She noted that she recently found out that he was cheating on her via text messages and how she has not been able to fully move past this. Client addressed that she is working on regaining his trust. She briefly spoke around her upcoming birthday plans and how he is not comfortable with her going to the club for her 20st. Clinician actively listened, provided emotional support, validated client's feelings,addressed and processed current events, continue to build therapeutic relationship.  During this session, this clinician used the following therapeutic modalities: Client-centered Therapy and Supportive Psychotherapy    Substance Abuse was not addressed during this session. If the client is diagnosed with a co-occurring substance use disorder, please indicate any changes in the frequency or amount of use: N/A. Stage of change for addressing substance use diagnoses: No substance use/Not applicable    ASSESSMENT:  Luciana Orozco presents with a Euthymic/ normal mood.     her affect is Normal range and intensity, which is congruent, with her mood and the content of the session. The client has made progress on their goals.     Luciana Orozco presents with a low risk of suicide, low  "risk of self-harm, and low risk of harm to others.    For any risk assessment that surpasses a \"low\" rating, a safety plan must be developed.    A safety plan was indicated: no  If yes, describe in detail N/A    PLAN: Between sessions, Luciana Orozco will continue to build therapeutic relationship. At the next session, the therapist will use Client-centered Therapy and Supportive Psychotherapy to address anxiety and depression.    Behavioral Health Treatment Plan and Discharge Planning: Luciana Orozco is aware of and agrees to continue to work on their treatment plan. They have identified and are working toward their discharge goals. yes    Depression Follow-up Plan Completed: Not applicable    Visit start and stop times:    06/18/25  Start Time: 1003  Stop Time: 1058  Total Visit Time: 55 minutes    "

## 2025-07-02 ENCOUNTER — TELEPHONE (OUTPATIENT)
Age: 20
End: 2025-07-02

## 2025-07-02 NOTE — TELEPHONE ENCOUNTER
Patient is calling regarding cancelling an appointment.    Date/Time: 7/2/2025 at 1 pm    Reason: pt is sick    Patient was rescheduled: YES [x] NO []  If yes, when was Patient reschedule for: 7/15/2025 at 3 pm    Patient requesting call back to reschedule: YES [] NO [x]

## 2025-07-15 ENCOUNTER — SOCIAL WORK (OUTPATIENT)
Dept: BEHAVIORAL/MENTAL HEALTH CLINIC | Facility: CLINIC | Age: 20
End: 2025-07-15
Payer: COMMERCIAL

## 2025-07-15 DIAGNOSIS — M42.06 SCHEUERMANN'S KYPHOSIS OF LUMBAR SPINE: Primary | ICD-10-CM

## 2025-07-15 DIAGNOSIS — F41.1 GENERALIZED ANXIETY DISORDER: Primary | ICD-10-CM

## 2025-07-15 PROCEDURE — 90837 PSYTX W PT 60 MINUTES: CPT

## 2025-07-15 NOTE — PSYCH
"Behavioral Health Psychotherapy Progress Note    Psychotherapy Provided: Individual Psychotherapy     1. Generalized anxiety disorder            Goals addressed in session: Goal 1     DATA: Client presented for an in-person session. Client reported that she has been having recent dreams of her father. Client spoke in-length around her father and how he has been in and out of her life. Client addressed that he really only reaches out to her if he needs something (I.e. money). Client shared that she has been reaching out to him but has not been getting anything in return. Client expressed frustrations with this and how it has been difficult. Client noted that he is active on social media and how upset she gets when he posts things about his \"new family,\" and kids. Client noted that he will say things online that are not true and how this annoys her. Client also talked about her brother and how he has severe behavioral issues and wanting support in navigating how to help her grandmother with him. Clinician actively listened, provided emotional support, validated client's feelings, addressed and processed current events, reflected on her father's behaviors, explored patterns of those who exhibit low self-esteem, focus on what is within her control, explored support for her brother and programs he may benefit from.  During this session, this clinician used the following therapeutic modalities: Client-centered Therapy, Cognitive Behavioral Therapy, and Supportive Psychotherapy    Substance Abuse was not addressed during this session. If the client is diagnosed with a co-occurring substance use disorder, please indicate any changes in the frequency or amount of use: N/A. Stage of change for addressing substance use diagnoses: No substance use/Not applicable    ASSESSMENT:  Luciana Orozco presents with a Euthymic/ normal mood.     her affect is Normal range and intensity, which is congruent, with her mood and the content " "of the session. The client has made progress on their goals.     Luciana Orozco presents with a low risk of suicide, low risk of self-harm, and low risk of harm to others.    For any risk assessment that surpasses a \"low\" rating, a safety plan must be developed.    A safety plan was indicated: no  If yes, describe in detail N/A    PLAN: Between sessions, Luciana Orozco will continue to utilize coping skills and reflect on what was discussed in session. At the next session, the therapist will use Client-centered Therapy and Supportive Psychotherapy to address anxiety.    Behavioral Health Treatment Plan and Discharge Planning: Luciaan Orozco is aware of and agrees to continue to work on their treatment plan. They have identified and are working toward their discharge goals. yes    Depression Follow-up Plan Completed: Not applicable    Visit start and stop times:    07/15/25  Start Time: 1500  Stop Time: 1557  Total Visit Time: 57 minutes    "

## 2025-07-28 ENCOUNTER — HOSPITAL ENCOUNTER (OUTPATIENT)
Dept: RADIOLOGY | Facility: HOSPITAL | Age: 20
Discharge: HOME | End: 2025-07-28
Attending: ORTHOPAEDIC SURGERY
Payer: COMMERCIAL

## 2025-07-28 ENCOUNTER — OFFICE VISIT (OUTPATIENT)
Dept: OBGYN CLINIC | Facility: HOSPITAL | Age: 20
End: 2025-07-28

## 2025-07-28 VITALS — HEIGHT: 65 IN | WEIGHT: 275.57 LBS | BODY MASS INDEX: 45.91 KG/M2

## 2025-07-28 DIAGNOSIS — M42.06 SCHEUERMANN'S KYPHOSIS OF LUMBAR SPINE: Primary | ICD-10-CM

## 2025-07-28 DIAGNOSIS — M42.06 SCHEUERMANN'S KYPHOSIS OF LUMBAR SPINE: ICD-10-CM

## 2025-07-28 PROCEDURE — 99024 POSTOP FOLLOW-UP VISIT: CPT | Performed by: ORTHOPAEDIC SURGERY

## 2025-07-28 PROCEDURE — 72082 X-RAY EXAM ENTIRE SPI 2/3 VW: CPT

## 2025-08-05 ENCOUNTER — SOCIAL WORK (OUTPATIENT)
Dept: BEHAVIORAL/MENTAL HEALTH CLINIC | Facility: CLINIC | Age: 20
End: 2025-08-05
Payer: COMMERCIAL

## 2025-08-05 DIAGNOSIS — F41.1 GENERALIZED ANXIETY DISORDER: Primary | ICD-10-CM

## 2025-08-05 PROCEDURE — 90837 PSYTX W PT 60 MINUTES: CPT

## 2025-08-13 ENCOUNTER — OFFICE VISIT (OUTPATIENT)
Dept: FAMILY MEDICINE CLINIC | Facility: HOSPITAL | Age: 20
End: 2025-08-13
Payer: COMMERCIAL

## 2025-08-13 PROBLEM — Z72.89 CURRENT EVERY DAY VAPING: Status: ACTIVE | Noted: 2025-04-29

## 2025-08-13 PROBLEM — L30.9 ECZEMA: Status: ACTIVE | Noted: 2025-08-13

## 2025-08-13 PROBLEM — L21.9 SEBORRHEIC DERMATITIS OF SCALP: Status: ACTIVE | Noted: 2025-08-13

## (undated) DEVICE — SPK10281 JACKSON TABLE KIT: Brand: SPK10281 JACKSON TABLE KIT

## (undated) DEVICE — DRAPE EQUIPMENT RF WAND

## (undated) DEVICE — REM POLYHESIVE ADULT PATIENT RETURN ELECTRODE: Brand: VALLEYLAB

## (undated) DEVICE — SURGI KIT INSTRUMENT ORGANIZER

## (undated) DEVICE — PLASMABLADE PS200-040 4.0: Brand: PLASMABLADE™

## (undated) DEVICE — C-ARM: Brand: UNBRANDED

## (undated) DEVICE — BETHLEHEM UNIVERSAL SPINE, KIT: Brand: CARDINAL HEALTH

## (undated) DEVICE — SUPPLY FEE STD

## (undated) DEVICE — DRESSING MEPILEX FOAM BORDER FLEX 4 X 4IN

## (undated) DEVICE — DRESSING MEPORE FILM ADHESIVE 4 X 5IN

## (undated) DEVICE — GLOVE SRG BIOGEL 7

## (undated) DEVICE — HEMOSTATIC MATRIX SURGIFLO 8ML W/THROMBIN

## (undated) DEVICE — GLOVE SRG LF STRL BGL SKNSNS 7.5 PF

## (undated) DEVICE — DRESSING MEPILEX FOAM BORDER SACRUM 8.7 X 9.8IN

## (undated) DEVICE — CELL SAVER 5/5+ BOWL KIT-125ML: Brand: HAEMONETICS CELL SAVER 5/5+ SYSTEMS

## (undated) DEVICE — TAPE HYPAFIX 6IN X 10YD

## (undated) DEVICE — GLOVE SRG BIOGEL ECLIPSE 7.5

## (undated) DEVICE — PROXIMATE SKIN STAPLERS (35 WIDE) CONTAINS 35 STAINLESS STEEL STAPLES (FIXED HEAD): Brand: PROXIMATE

## (undated) DEVICE — MARKER REFLECTIVE RADIOPAQUE SPHERE

## (undated) DEVICE — SUT MONOCRYL 3-0 PS-2 18 IN Y497G

## (undated) DEVICE — ABDOMINAL PAD: Brand: DERMACEA

## (undated) DEVICE — SPONGE SCRUB 4 PCT CHLORHEXIDINE

## (undated) DEVICE — DISPOSABLE EQUIPMENT COVER: Brand: SMALL TOWEL DRAPE

## (undated) DEVICE — 60 ML SYRINGE,REGULAR TIP: Brand: MONOJECT

## (undated) DEVICE — ANTIBACTERIAL UNDYED BRAIDED (POLYGLACTIN 910), SYNTHETIC ABSORBABLE SUTURE: Brand: COATED VICRYL

## (undated) DEVICE — OCCLUSIVE GAUZE STRIP,3% BISMUTH TRIBROMOPHENATE IN PETROLATUM BLEND: Brand: XEROFORM

## (undated) DEVICE — 3M™ IOBAN™ 2 ANTIMICROBIAL INCISE DRAPE 6650EZ: Brand: IOBAN™ 2

## (undated) DEVICE — INTENDED FOR TISSUE SEPARATION, AND OTHER PROCEDURES THAT REQUIRE A SHARP SURGICAL BLADE TO PUNCTURE OR CUT.: Brand: BARD-PARKER ® CARBON RIB-BACK BLADES

## (undated) DEVICE — LAPAROTOMY DRAPE WITH POUCHES: Brand: CONVERTORS

## (undated) DEVICE — DRAPE SHEET X-LG

## (undated) DEVICE — TRAY FOLEY 16FR SURESTEP TEMP SENS URIMETER STAT LOK

## (undated) DEVICE — 3M™ STERI-STRIP™ COMPOUND BENZOIN TINCTURE 40 BAGS/CARTON 4 CARTONS/CASE C1544: Brand: 3M™ STERI-STRIP™

## (undated) DEVICE — TOOL MR8-14MH30 MR8 14CM MATCH 3MM: Brand: MIDAS REX MR8

## (undated) DEVICE — INTENDED FOR TISSUE SEPARATION, AND OTHER PROCEDURES THAT REQUIRE A SHARP SURGICAL BLADE TO PUNCTURE OR CUT.: Brand: BARD-PARKER SAFETY BLADES SIZE 10, STERILE

## (undated) DEVICE — BIPOLAR SEALER 23-112-1 AQM 6.0: Brand: AQUAMANTYS ®

## (undated) DEVICE — GLOVE INDICATOR PI UNDERGLOVE SZ 7 BLUE

## (undated) DEVICE — GAUZE SPONGES,16 PLY: Brand: CURITY

## (undated) DEVICE — MONITORING SPINAL IMPULSE CASE FEE

## (undated) DEVICE — PENCILETTE PUSH BUTTON COATED

## (undated) DEVICE — NEEDLE SPINAL18G X 3.5 IN QUINCKE

## (undated) DEVICE — LAPAROTOMY SPONGE - RF AND X-RAY DETECTABLE PRE-WASHED: Brand: SITUATE

## (undated) DEVICE — TOOL MR8-23TD3030 MR8 TWST DRIL 3MMX30MM: Brand: MIDAS REX

## (undated) DEVICE — DRAPE SHEET THREE QUARTER

## (undated) DEVICE — SUT VICRYL 2-0 CT-1 27 IN J259H

## (undated) DEVICE — 3M™ STERI-STRIP™ REINFORCED ADHESIVE SKIN CLOSURES, R1547, 1/2 IN X 4 IN (12 MM X 100 MM), 6 STRIPS/ENVELOPE: Brand: 3M™ STERI-STRIP™

## (undated) DEVICE — INSTRUMENT POUCH: Brand: CONVERTORS

## (undated) DEVICE — JP 3-SPRING RES W/10FR PVC DRAIN/TR: Brand: CARDINAL HEALTH

## (undated) DEVICE — CHLORAPREP HI-LITE 26ML ORANGE

## (undated) DEVICE — LIGHT HANDLE COVER SLEEVE DISP BLUE STELLAR

## (undated) DEVICE — MAZOR X SPINE DISP KIT

## (undated) DEVICE — BONESCALPEL 10MM BLUNT W/TUBESET